# Patient Record
Sex: MALE | Race: WHITE | NOT HISPANIC OR LATINO | Employment: OTHER | URBAN - METROPOLITAN AREA
[De-identification: names, ages, dates, MRNs, and addresses within clinical notes are randomized per-mention and may not be internally consistent; named-entity substitution may affect disease eponyms.]

---

## 2017-08-05 ENCOUNTER — APPOINTMENT (EMERGENCY)
Dept: ULTRASOUND IMAGING | Facility: HOSPITAL | Age: 82
DRG: 872 | End: 2017-08-05
Payer: MEDICARE

## 2017-08-05 ENCOUNTER — HOSPITAL ENCOUNTER (INPATIENT)
Facility: HOSPITAL | Age: 82
LOS: 2 days | Discharge: HOME HEALTH | DRG: 872 | End: 2017-08-07
Attending: INTERNAL MEDICINE | Admitting: INTERNAL MEDICINE
Payer: MEDICARE

## 2017-08-05 DIAGNOSIS — E11.9 DM2 (DIABETES MELLITUS, TYPE 2) (HCC): ICD-10-CM

## 2017-08-05 DIAGNOSIS — R53.81 MALAISE AND FATIGUE: ICD-10-CM

## 2017-08-05 DIAGNOSIS — N45.2 ORCHITIS: ICD-10-CM

## 2017-08-05 DIAGNOSIS — N45.1 EPIDIDYMITIS: Primary | ICD-10-CM

## 2017-08-05 DIAGNOSIS — A41.51 SEPSIS DUE TO ESCHERICHIA COLI (HCC): ICD-10-CM

## 2017-08-05 DIAGNOSIS — I10 HTN (HYPERTENSION), BENIGN: ICD-10-CM

## 2017-08-05 DIAGNOSIS — R53.83 MALAISE AND FATIGUE: ICD-10-CM

## 2017-08-05 DIAGNOSIS — A41.9 SEVERE SEPSIS (HCC): ICD-10-CM

## 2017-08-05 DIAGNOSIS — E86.0 DEHYDRATION: ICD-10-CM

## 2017-08-05 DIAGNOSIS — R73.9 HYPERGLYCEMIA: ICD-10-CM

## 2017-08-05 DIAGNOSIS — R65.20 SEVERE SEPSIS (HCC): ICD-10-CM

## 2017-08-05 PROBLEM — N45.3 EPIDIDYMO-ORCHITIS: Status: ACTIVE | Noted: 2017-08-05

## 2017-08-05 LAB
ACETONE SERPL-MCNC: NEGATIVE MG/DL
ALBUMIN SERPL BCP-MCNC: 2.9 G/DL (ref 3.5–5)
ALP SERPL-CCNC: 108 U/L (ref 46–116)
ALT SERPL W P-5'-P-CCNC: 20 U/L (ref 12–78)
ANION GAP SERPL CALCULATED.3IONS-SCNC: 15 MMOL/L (ref 4–13)
ARTERIAL PATENCY WRIST A: YES
AST SERPL W P-5'-P-CCNC: 14 U/L (ref 5–45)
BACTERIA UR QL AUTO: ABNORMAL /HPF
BASE EXCESS BLDA CALC-SCNC: -3.2 MMOL/L
BASOPHILS # BLD AUTO: 0.07 THOUSANDS/ΜL (ref 0–0.1)
BASOPHILS NFR BLD AUTO: 1 % (ref 0–1)
BILIRUB SERPL-MCNC: 0.7 MG/DL (ref 0.2–1)
BILIRUB UR QL STRIP: NEGATIVE
BUN SERPL-MCNC: 32 MG/DL (ref 5–25)
CALCIUM SERPL-MCNC: 9.2 MG/DL (ref 8.3–10.1)
CHLORIDE SERPL-SCNC: 97 MMOL/L (ref 100–108)
CLARITY UR: CLEAR
CO2 SERPL-SCNC: 19 MMOL/L (ref 21–32)
COLOR UR: YELLOW
CREAT SERPL-MCNC: 1.31 MG/DL (ref 0.6–1.3)
EOSINOPHIL # BLD AUTO: 0.09 THOUSAND/ΜL (ref 0–0.61)
EOSINOPHIL NFR BLD AUTO: 1 % (ref 0–6)
ERYTHROCYTE [DISTWIDTH] IN BLOOD BY AUTOMATED COUNT: 12.4 % (ref 11.6–15.1)
EST. AVERAGE GLUCOSE BLD GHB EST-MCNC: 235 MG/DL
GFR SERPL CREATININE-BSD FRML MDRD: 51 ML/MIN/1.73SQ M
GLUCOSE SERPL-MCNC: 221 MG/DL (ref 65–140)
GLUCOSE SERPL-MCNC: 270 MG/DL (ref 65–140)
GLUCOSE SERPL-MCNC: 384 MG/DL (ref 65–140)
GLUCOSE SERPL-MCNC: 414 MG/DL (ref 65–140)
GLUCOSE UR STRIP-MCNC: ABNORMAL MG/DL
HBA1C MFR BLD: 9.8 % (ref 4.2–6.3)
HCO3 BLDA-SCNC: 18.1 MMOL/L (ref 22–28)
HCT VFR BLD AUTO: 39.9 % (ref 36.5–49.3)
HGB BLD-MCNC: 14.1 G/DL (ref 12–17)
HGB UR QL STRIP.AUTO: ABNORMAL
KETONES UR STRIP-MCNC: NEGATIVE MG/DL
LACTATE SERPL-SCNC: 1.5 MMOL/L (ref 0.5–2)
LACTATE SERPL-SCNC: 3 MMOL/L (ref 0.5–2)
LEUKOCYTE ESTERASE UR QL STRIP: ABNORMAL
LIPASE SERPL-CCNC: 350 U/L (ref 73–393)
LYMPHOCYTES # BLD AUTO: 1.27 THOUSANDS/ΜL (ref 0.6–4.47)
LYMPHOCYTES NFR BLD AUTO: 10 % (ref 14–44)
MCH RBC QN AUTO: 32 PG (ref 26.8–34.3)
MCHC RBC AUTO-ENTMCNC: 35.3 G/DL (ref 31.4–37.4)
MCV RBC AUTO: 91 FL (ref 82–98)
MONOCYTES # BLD AUTO: 0.78 THOUSAND/ΜL (ref 0.17–1.22)
MONOCYTES NFR BLD AUTO: 6 % (ref 4–12)
NEUTROPHILS # BLD AUTO: 10.28 THOUSANDS/ΜL (ref 1.85–7.62)
NEUTS SEG NFR BLD AUTO: 81 % (ref 43–75)
NITRITE UR QL STRIP: NEGATIVE
NON VENT ROOM AIR: 21 %
NON-SQ EPI CELLS URNS QL MICRO: ABNORMAL /HPF
NRBC BLD AUTO-RTO: 0 /100 WBCS
O2 CT BLDA-SCNC: 19.9 ML/DL (ref 16–23)
OXYHGB MFR BLDA: 96.9 % (ref 94–97)
PCO2 BLDA: 23.8 MM HG (ref 36–44)
PH BLDA: 7.5 [PH] (ref 7.35–7.45)
PH UR STRIP.AUTO: 6 [PH] (ref 4.5–8)
PLATELET # BLD AUTO: 359 THOUSANDS/UL (ref 149–390)
PLATELET # BLD AUTO: 369 THOUSANDS/UL (ref 149–390)
PMV BLD AUTO: 9.2 FL (ref 8.9–12.7)
PMV BLD AUTO: 9.3 FL (ref 8.9–12.7)
PO2 BLDA: 102.2 MM HG (ref 75–129)
POTASSIUM SERPL-SCNC: 3.8 MMOL/L (ref 3.5–5.3)
PROT SERPL-MCNC: 6.9 G/DL (ref 6.4–8.2)
PROT UR STRIP-MCNC: NEGATIVE MG/DL
RBC # BLD AUTO: 4.41 MILLION/UL (ref 3.88–5.62)
RBC #/AREA URNS AUTO: ABNORMAL /HPF
SODIUM SERPL-SCNC: 131 MMOL/L (ref 136–145)
SP GR UR STRIP.AUTO: <=1.005 (ref 1–1.03)
SPECIMEN SOURCE: ABNORMAL
TROPONIN I SERPL-MCNC: <0.02 NG/ML
TSH SERPL DL<=0.05 MIU/L-ACNC: 1.57 UIU/ML (ref 0.36–3.74)
UROBILINOGEN UR QL STRIP.AUTO: 0.2 E.U./DL
WBC # BLD AUTO: 12.64 THOUSAND/UL (ref 4.31–10.16)
WBC #/AREA URNS AUTO: ABNORMAL /HPF

## 2017-08-05 PROCEDURE — 83690 ASSAY OF LIPASE: CPT | Performed by: PHYSICIAN ASSISTANT

## 2017-08-05 PROCEDURE — 87186 SC STD MICRODIL/AGAR DIL: CPT | Performed by: PHYSICIAN ASSISTANT

## 2017-08-05 PROCEDURE — 93005 ELECTROCARDIOGRAM TRACING: CPT

## 2017-08-05 PROCEDURE — 82009 KETONE BODYS QUAL: CPT | Performed by: PHYSICIAN ASSISTANT

## 2017-08-05 PROCEDURE — 80053 COMPREHEN METABOLIC PANEL: CPT | Performed by: PHYSICIAN ASSISTANT

## 2017-08-05 PROCEDURE — 85049 AUTOMATED PLATELET COUNT: CPT | Performed by: PHYSICIAN ASSISTANT

## 2017-08-05 PROCEDURE — 96361 HYDRATE IV INFUSION ADD-ON: CPT

## 2017-08-05 PROCEDURE — 81001 URINALYSIS AUTO W/SCOPE: CPT | Performed by: PHYSICIAN ASSISTANT

## 2017-08-05 PROCEDURE — 76870 US EXAM SCROTUM: CPT

## 2017-08-05 PROCEDURE — 82948 REAGENT STRIP/BLOOD GLUCOSE: CPT

## 2017-08-05 PROCEDURE — 85025 COMPLETE CBC W/AUTO DIFF WBC: CPT | Performed by: PHYSICIAN ASSISTANT

## 2017-08-05 PROCEDURE — 99285 EMERGENCY DEPT VISIT HI MDM: CPT

## 2017-08-05 PROCEDURE — 96365 THER/PROPH/DIAG IV INF INIT: CPT

## 2017-08-05 PROCEDURE — 83605 ASSAY OF LACTIC ACID: CPT | Performed by: PHYSICIAN ASSISTANT

## 2017-08-05 PROCEDURE — 84443 ASSAY THYROID STIM HORMONE: CPT | Performed by: PHYSICIAN ASSISTANT

## 2017-08-05 PROCEDURE — 87086 URINE CULTURE/COLONY COUNT: CPT | Performed by: PHYSICIAN ASSISTANT

## 2017-08-05 PROCEDURE — 36600 WITHDRAWAL OF ARTERIAL BLOOD: CPT

## 2017-08-05 PROCEDURE — 82805 BLOOD GASES W/O2 SATURATION: CPT | Performed by: PHYSICIAN ASSISTANT

## 2017-08-05 PROCEDURE — 36415 COLL VENOUS BLD VENIPUNCTURE: CPT | Performed by: PHYSICIAN ASSISTANT

## 2017-08-05 PROCEDURE — 96366 THER/PROPH/DIAG IV INF ADDON: CPT

## 2017-08-05 PROCEDURE — 83036 HEMOGLOBIN GLYCOSYLATED A1C: CPT | Performed by: PHYSICIAN ASSISTANT

## 2017-08-05 PROCEDURE — 87040 BLOOD CULTURE FOR BACTERIA: CPT | Performed by: PHYSICIAN ASSISTANT

## 2017-08-05 PROCEDURE — 87077 CULTURE AEROBIC IDENTIFY: CPT | Performed by: PHYSICIAN ASSISTANT

## 2017-08-05 PROCEDURE — 84484 ASSAY OF TROPONIN QUANT: CPT | Performed by: PHYSICIAN ASSISTANT

## 2017-08-05 RX ORDER — GLIPIZIDE 10 MG/1
10 TABLET, FILM COATED, EXTENDED RELEASE ORAL DAILY
COMMUNITY
End: 2017-08-07 | Stop reason: HOSPADM

## 2017-08-05 RX ORDER — ASPIRIN 81 MG/1
81 TABLET ORAL DAILY
COMMUNITY

## 2017-08-05 RX ORDER — ASPIRIN 81 MG/1
81 TABLET ORAL DAILY
Status: DISCONTINUED | OUTPATIENT
Start: 2017-08-06 | End: 2017-08-07 | Stop reason: HOSPADM

## 2017-08-05 RX ORDER — ONDANSETRON 2 MG/ML
4 INJECTION INTRAMUSCULAR; INTRAVENOUS EVERY 6 HOURS PRN
Status: DISCONTINUED | OUTPATIENT
Start: 2017-08-05 | End: 2017-08-07 | Stop reason: HOSPADM

## 2017-08-05 RX ORDER — LISINOPRIL 10 MG/1
10 TABLET ORAL DAILY
Status: DISCONTINUED | OUTPATIENT
Start: 2017-08-06 | End: 2017-08-07 | Stop reason: HOSPADM

## 2017-08-05 RX ORDER — ACETAMINOPHEN 325 MG/1
650 TABLET ORAL EVERY 6 HOURS PRN
Status: DISCONTINUED | OUTPATIENT
Start: 2017-08-05 | End: 2017-08-07 | Stop reason: HOSPADM

## 2017-08-05 RX ORDER — LISINOPRIL 10 MG/1
10 TABLET ORAL DAILY
COMMUNITY

## 2017-08-05 RX ORDER — TAMSULOSIN HYDROCHLORIDE 0.4 MG/1
0.4 CAPSULE ORAL
Status: DISCONTINUED | OUTPATIENT
Start: 2017-08-05 | End: 2017-08-07 | Stop reason: HOSPADM

## 2017-08-05 RX ORDER — INSULIN GLARGINE 100 [IU]/ML
10 INJECTION, SOLUTION SUBCUTANEOUS
Status: DISCONTINUED | OUTPATIENT
Start: 2017-08-05 | End: 2017-08-07 | Stop reason: HOSPADM

## 2017-08-05 RX ORDER — SODIUM CHLORIDE 9 MG/ML
125 INJECTION, SOLUTION INTRAVENOUS CONTINUOUS
Status: DISCONTINUED | OUTPATIENT
Start: 2017-08-05 | End: 2017-08-06

## 2017-08-05 RX ADMIN — SODIUM CHLORIDE 1000 ML: 0.9 INJECTION, SOLUTION INTRAVENOUS at 14:36

## 2017-08-05 RX ADMIN — SODIUM CHLORIDE 1000 ML: 0.9 INJECTION, SOLUTION INTRAVENOUS at 12:30

## 2017-08-05 RX ADMIN — TAMSULOSIN HYDROCHLORIDE 0.4 MG: 0.4 CAPSULE ORAL at 18:16

## 2017-08-05 RX ADMIN — SODIUM CHLORIDE 125 ML/HR: 0.9 INJECTION, SOLUTION INTRAVENOUS at 18:04

## 2017-08-05 RX ADMIN — CEFEPIME HYDROCHLORIDE 2000 MG: 2 INJECTION, SOLUTION INTRAVENOUS at 13:19

## 2017-08-05 RX ADMIN — INSULIN LISPRO 2 UNITS: 100 INJECTION, SOLUTION INTRAVENOUS; SUBCUTANEOUS at 18:11

## 2017-08-05 RX ADMIN — INSULIN GLARGINE 10 UNITS: 100 INJECTION, SOLUTION SUBCUTANEOUS at 21:43

## 2017-08-06 PROBLEM — R53.81 MALAISE AND FATIGUE: Status: RESOLVED | Noted: 2017-08-05 | Resolved: 2017-08-06

## 2017-08-06 PROBLEM — I10 HTN (HYPERTENSION), BENIGN: Status: ACTIVE | Noted: 2017-08-06

## 2017-08-06 PROBLEM — E11.9 DM2 (DIABETES MELLITUS, TYPE 2) (HCC): Status: ACTIVE | Noted: 2017-08-06

## 2017-08-06 PROBLEM — R73.9 HYPERGLYCEMIA: Status: RESOLVED | Noted: 2017-08-05 | Resolved: 2017-08-06

## 2017-08-06 PROBLEM — R53.83 MALAISE AND FATIGUE: Status: RESOLVED | Noted: 2017-08-05 | Resolved: 2017-08-06

## 2017-08-06 PROBLEM — N17.9 AKI (ACUTE KIDNEY INJURY) (HCC): Status: ACTIVE | Noted: 2017-08-06

## 2017-08-06 PROBLEM — A41.9 SEPSIS (HCC): Status: RESOLVED | Noted: 2017-08-05 | Resolved: 2017-08-06

## 2017-08-06 PROBLEM — E78.5 HLD (HYPERLIPIDEMIA): Status: ACTIVE | Noted: 2017-08-06

## 2017-08-06 LAB
ANION GAP SERPL CALCULATED.3IONS-SCNC: 10 MMOL/L (ref 4–13)
ATRIAL RATE: 68 BPM
BUN SERPL-MCNC: 21 MG/DL (ref 5–25)
CALCIUM SERPL-MCNC: 8.7 MG/DL (ref 8.3–10.1)
CHLORIDE SERPL-SCNC: 105 MMOL/L (ref 100–108)
CO2 SERPL-SCNC: 22 MMOL/L (ref 21–32)
CREAT SERPL-MCNC: 1 MG/DL (ref 0.6–1.3)
ERYTHROCYTE [DISTWIDTH] IN BLOOD BY AUTOMATED COUNT: 12.6 % (ref 11.6–15.1)
GFR SERPL CREATININE-BSD FRML MDRD: 71 ML/MIN/1.73SQ M
GLUCOSE SERPL-MCNC: 176 MG/DL (ref 65–140)
GLUCOSE SERPL-MCNC: 181 MG/DL (ref 65–140)
GLUCOSE SERPL-MCNC: 228 MG/DL (ref 65–140)
GLUCOSE SERPL-MCNC: 250 MG/DL (ref 65–140)
GLUCOSE SERPL-MCNC: 270 MG/DL (ref 65–140)
HCT VFR BLD AUTO: 36.5 % (ref 36.5–49.3)
HGB BLD-MCNC: 12.7 G/DL (ref 12–17)
MAGNESIUM SERPL-MCNC: 2 MG/DL (ref 1.6–2.6)
MCH RBC QN AUTO: 31.8 PG (ref 26.8–34.3)
MCHC RBC AUTO-ENTMCNC: 34.8 G/DL (ref 31.4–37.4)
MCV RBC AUTO: 91 FL (ref 82–98)
P AXIS: 59 DEGREES
PLATELET # BLD AUTO: 337 THOUSANDS/UL (ref 149–390)
PMV BLD AUTO: 9.1 FL (ref 8.9–12.7)
POTASSIUM SERPL-SCNC: 4 MMOL/L (ref 3.5–5.3)
PR INTERVAL: 196 MS
QRS AXIS: 108 DEGREES
QRSD INTERVAL: 72 MS
QT INTERVAL: 398 MS
QTC INTERVAL: 423 MS
RBC # BLD AUTO: 4 MILLION/UL (ref 3.88–5.62)
SODIUM SERPL-SCNC: 137 MMOL/L (ref 136–145)
T WAVE AXIS: 80 DEGREES
VENTRICULAR RATE: 68 BPM
WBC # BLD AUTO: 11.17 THOUSAND/UL (ref 4.31–10.16)

## 2017-08-06 PROCEDURE — 82948 REAGENT STRIP/BLOOD GLUCOSE: CPT

## 2017-08-06 PROCEDURE — 85027 COMPLETE CBC AUTOMATED: CPT | Performed by: PHYSICIAN ASSISTANT

## 2017-08-06 PROCEDURE — 80048 BASIC METABOLIC PNL TOTAL CA: CPT | Performed by: PHYSICIAN ASSISTANT

## 2017-08-06 PROCEDURE — 83735 ASSAY OF MAGNESIUM: CPT | Performed by: PHYSICIAN ASSISTANT

## 2017-08-06 RX ORDER — PRAVASTATIN SODIUM 40 MG
40 TABLET ORAL
Status: DISCONTINUED | OUTPATIENT
Start: 2017-08-06 | End: 2017-08-07 | Stop reason: HOSPADM

## 2017-08-06 RX ORDER — SENNOSIDES 8.6 MG
2 TABLET ORAL
Status: DISCONTINUED | OUTPATIENT
Start: 2017-08-06 | End: 2017-08-07 | Stop reason: HOSPADM

## 2017-08-06 RX ORDER — POLYETHYLENE GLYCOL 3350 17 G/17G
17 POWDER, FOR SOLUTION ORAL DAILY
Status: DISCONTINUED | OUTPATIENT
Start: 2017-08-06 | End: 2017-08-07 | Stop reason: HOSPADM

## 2017-08-06 RX ORDER — DOCUSATE SODIUM 100 MG/1
100 CAPSULE, LIQUID FILLED ORAL 2 TIMES DAILY
Status: DISCONTINUED | OUTPATIENT
Start: 2017-08-06 | End: 2017-08-07 | Stop reason: HOSPADM

## 2017-08-06 RX ADMIN — TAMSULOSIN HYDROCHLORIDE 0.4 MG: 0.4 CAPSULE ORAL at 16:34

## 2017-08-06 RX ADMIN — ENOXAPARIN SODIUM 40 MG: 40 INJECTION SUBCUTANEOUS at 08:19

## 2017-08-06 RX ADMIN — INSULIN LISPRO 2 UNITS: 100 INJECTION, SOLUTION INTRAVENOUS; SUBCUTANEOUS at 16:35

## 2017-08-06 RX ADMIN — INSULIN LISPRO 1 UNITS: 100 INJECTION, SOLUTION INTRAVENOUS; SUBCUTANEOUS at 07:31

## 2017-08-06 RX ADMIN — INSULIN LISPRO 2 UNITS: 100 INJECTION, SOLUTION INTRAVENOUS; SUBCUTANEOUS at 12:20

## 2017-08-06 RX ADMIN — ASPIRIN 81 MG: 81 TABLET, COATED ORAL at 08:19

## 2017-08-06 RX ADMIN — SENNOSIDES 17.2 MG: 8.6 TABLET, FILM COATED ORAL at 21:35

## 2017-08-06 RX ADMIN — DOCUSATE SODIUM 100 MG: 100 CAPSULE, LIQUID FILLED ORAL at 12:00

## 2017-08-06 RX ADMIN — PRAVASTATIN SODIUM 40 MG: 40 TABLET ORAL at 16:34

## 2017-08-06 RX ADMIN — SODIUM CHLORIDE 125 ML/HR: 0.9 INJECTION, SOLUTION INTRAVENOUS at 02:23

## 2017-08-06 RX ADMIN — INSULIN GLARGINE 10 UNITS: 100 INJECTION, SOLUTION SUBCUTANEOUS at 21:35

## 2017-08-06 RX ADMIN — SODIUM CHLORIDE 125 ML/HR: 0.9 INJECTION, SOLUTION INTRAVENOUS at 10:04

## 2017-08-06 RX ADMIN — LISINOPRIL 10 MG: 10 TABLET ORAL at 08:19

## 2017-08-06 RX ADMIN — POLYETHYLENE GLYCOL 3350 17 G: 17 POWDER, FOR SOLUTION ORAL at 12:23

## 2017-08-06 RX ADMIN — CEFEPIME HYDROCHLORIDE 1000 MG: 1 INJECTION, POWDER, FOR SOLUTION INTRAMUSCULAR; INTRAVENOUS at 13:00

## 2017-08-07 VITALS
HEART RATE: 55 BPM | OXYGEN SATURATION: 97 % | HEIGHT: 70 IN | TEMPERATURE: 98 F | RESPIRATION RATE: 18 BRPM | BODY MASS INDEX: 24.93 KG/M2 | WEIGHT: 174.16 LBS | DIASTOLIC BLOOD PRESSURE: 56 MMHG | SYSTOLIC BLOOD PRESSURE: 111 MMHG

## 2017-08-07 PROBLEM — N17.9 AKI (ACUTE KIDNEY INJURY) (HCC): Status: RESOLVED | Noted: 2017-08-06 | Resolved: 2017-08-07

## 2017-08-07 LAB
ANION GAP SERPL CALCULATED.3IONS-SCNC: 9 MMOL/L (ref 4–13)
BACTERIA UR CULT: NORMAL
BUN SERPL-MCNC: 17 MG/DL (ref 5–25)
CALCIUM SERPL-MCNC: 8.8 MG/DL (ref 8.3–10.1)
CHLORIDE SERPL-SCNC: 105 MMOL/L (ref 100–108)
CO2 SERPL-SCNC: 22 MMOL/L (ref 21–32)
CREAT SERPL-MCNC: 0.94 MG/DL (ref 0.6–1.3)
ERYTHROCYTE [DISTWIDTH] IN BLOOD BY AUTOMATED COUNT: 12.7 % (ref 11.6–15.1)
GFR SERPL CREATININE-BSD FRML MDRD: 77 ML/MIN/1.73SQ M
GLUCOSE SERPL-MCNC: 187 MG/DL (ref 65–140)
GLUCOSE SERPL-MCNC: 213 MG/DL (ref 65–140)
GLUCOSE SERPL-MCNC: 249 MG/DL (ref 65–140)
GLUCOSE SERPL-MCNC: 258 MG/DL (ref 65–140)
HCT VFR BLD AUTO: 36.7 % (ref 36.5–49.3)
HGB BLD-MCNC: 12.6 G/DL (ref 12–17)
MCH RBC QN AUTO: 31.2 PG (ref 26.8–34.3)
MCHC RBC AUTO-ENTMCNC: 34.3 G/DL (ref 31.4–37.4)
MCV RBC AUTO: 91 FL (ref 82–98)
PLATELET # BLD AUTO: 302 THOUSANDS/UL (ref 149–390)
PMV BLD AUTO: 9.2 FL (ref 8.9–12.7)
POTASSIUM SERPL-SCNC: 4.3 MMOL/L (ref 3.5–5.3)
RBC # BLD AUTO: 4.04 MILLION/UL (ref 3.88–5.62)
SODIUM SERPL-SCNC: 136 MMOL/L (ref 136–145)
WBC # BLD AUTO: 8.02 THOUSAND/UL (ref 4.31–10.16)

## 2017-08-07 PROCEDURE — 85027 COMPLETE CBC AUTOMATED: CPT | Performed by: PHYSICIAN ASSISTANT

## 2017-08-07 PROCEDURE — G8979 MOBILITY GOAL STATUS: HCPCS

## 2017-08-07 PROCEDURE — 97163 PT EVAL HIGH COMPLEX 45 MIN: CPT

## 2017-08-07 PROCEDURE — 82948 REAGENT STRIP/BLOOD GLUCOSE: CPT

## 2017-08-07 PROCEDURE — 80048 BASIC METABOLIC PNL TOTAL CA: CPT | Performed by: PHYSICIAN ASSISTANT

## 2017-08-07 PROCEDURE — G8978 MOBILITY CURRENT STATUS: HCPCS

## 2017-08-07 RX ORDER — PRAVASTATIN SODIUM 40 MG
40 TABLET ORAL
Qty: 30 TABLET | Refills: 0 | Status: SHIPPED | OUTPATIENT
Start: 2017-08-07 | End: 2017-08-07 | Stop reason: HOSPADM

## 2017-08-07 RX ORDER — INSULIN GLARGINE 100 [IU]/ML
10 INJECTION, SOLUTION SUBCUTANEOUS
Qty: 10 ML | Refills: 0 | Status: SHIPPED | OUTPATIENT
Start: 2017-08-07 | End: 2017-08-07 | Stop reason: HOSPADM

## 2017-08-07 RX ORDER — DOXYCYCLINE HYCLATE 100 MG
100 TABLET ORAL 2 TIMES DAILY
Qty: 12 TABLET | Refills: 0 | Status: SHIPPED | OUTPATIENT
Start: 2017-08-07 | End: 2017-08-13

## 2017-08-07 RX ADMIN — ENOXAPARIN SODIUM 40 MG: 40 INJECTION SUBCUTANEOUS at 10:11

## 2017-08-07 RX ADMIN — PRAVASTATIN SODIUM 40 MG: 40 TABLET ORAL at 17:18

## 2017-08-07 RX ADMIN — INSULIN LISPRO 1 UNITS: 100 INJECTION, SOLUTION INTRAVENOUS; SUBCUTANEOUS at 10:12

## 2017-08-07 RX ADMIN — TAMSULOSIN HYDROCHLORIDE 0.4 MG: 0.4 CAPSULE ORAL at 17:18

## 2017-08-07 RX ADMIN — LISINOPRIL 10 MG: 10 TABLET ORAL at 10:11

## 2017-08-07 RX ADMIN — ASPIRIN 81 MG: 81 TABLET, COATED ORAL at 10:11

## 2017-08-07 RX ADMIN — CEFEPIME HYDROCHLORIDE 1000 MG: 1 INJECTION, POWDER, FOR SOLUTION INTRAMUSCULAR; INTRAVENOUS at 14:13

## 2017-08-07 RX ADMIN — DOCUSATE SODIUM 100 MG: 100 CAPSULE, LIQUID FILLED ORAL at 10:11

## 2017-08-07 RX ADMIN — INSULIN LISPRO 2 UNITS: 100 INJECTION, SOLUTION INTRAVENOUS; SUBCUTANEOUS at 13:37

## 2017-08-07 RX ADMIN — POLYETHYLENE GLYCOL 3350 17 G: 17 POWDER, FOR SOLUTION ORAL at 10:11

## 2017-08-07 RX ADMIN — DOCUSATE SODIUM 100 MG: 100 CAPSULE, LIQUID FILLED ORAL at 17:18

## 2017-08-07 RX ADMIN — INSULIN LISPRO 3 UNITS: 100 INJECTION, SOLUTION INTRAVENOUS; SUBCUTANEOUS at 17:18

## 2017-08-10 LAB
BACTERIA BLD CULT: NORMAL
BACTERIA BLD CULT: NORMAL

## 2023-08-10 LAB
LEFT EYE DIABETIC RETINOPATHY: NORMAL
RIGHT EYE DIABETIC RETINOPATHY: NORMAL

## 2023-12-05 ENCOUNTER — TELEPHONE (OUTPATIENT)
Age: 88
End: 2023-12-05

## 2023-12-05 NOTE — TELEPHONE ENCOUNTER
FYI:  Patient canceled  his appt. He stated he was discharged from hospital after having surgery. Was there for 3 days. Patient will call back to reschedule when he feels better.

## 2023-12-20 ENCOUNTER — OFFICE VISIT (OUTPATIENT)
Dept: URGENT CARE | Facility: MEDICAL CENTER | Age: 88
End: 2023-12-20
Payer: MEDICARE

## 2023-12-20 VITALS
DIASTOLIC BLOOD PRESSURE: 82 MMHG | TEMPERATURE: 98 F | SYSTOLIC BLOOD PRESSURE: 134 MMHG | RESPIRATION RATE: 18 BRPM | HEART RATE: 80 BPM | OXYGEN SATURATION: 99 %

## 2023-12-20 DIAGNOSIS — S61.219A LACERATION WITHOUT FOREIGN BODY OF UNSPECIFIED FINGER WITHOUT DAMAGE TO NAIL, INITIAL ENCOUNTER: Primary | ICD-10-CM

## 2023-12-20 PROCEDURE — G0463 HOSPITAL OUTPT CLINIC VISIT: HCPCS | Performed by: PHYSICIAN ASSISTANT

## 2023-12-20 PROCEDURE — 99213 OFFICE O/P EST LOW 20 MIN: CPT | Performed by: PHYSICIAN ASSISTANT

## 2023-12-20 RX ORDER — TERAZOSIN 1 MG/1
CAPSULE ORAL
COMMUNITY
Start: 2011-04-19 | End: 2023-12-28

## 2023-12-20 RX ORDER — GLIPIZIDE 10 MG/1
TABLET ORAL
COMMUNITY
Start: 2011-03-20 | End: 2023-12-28

## 2023-12-20 RX ORDER — PIOGLITAZONEHYDROCHLORIDE 30 MG/1
TABLET ORAL
COMMUNITY
Start: 2011-04-25 | End: 2023-12-28

## 2023-12-20 RX ORDER — ROSUVASTATIN CALCIUM 5 MG/1
5 TABLET, COATED ORAL DAILY
COMMUNITY

## 2023-12-20 RX ORDER — TAMSULOSIN HYDROCHLORIDE 0.4 MG/1
0.4 CAPSULE ORAL DAILY
COMMUNITY
Start: 2023-10-26

## 2023-12-20 RX ORDER — FOSINOPRIL SODIUM 10 MG/1
TABLET ORAL
COMMUNITY
Start: 2011-04-19 | End: 2023-12-28

## 2023-12-20 NOTE — PATIENT INSTRUCTIONS
Laceration finger  Keep wound clean and dry, change dressing twice daily  Follow up with PCP in 3-5 days.  Proceed to  ER if symptoms worsen.

## 2023-12-20 NOTE — PROGRESS NOTES
"  [facility]        NAME: Jeffery Keys is a 88 y.o. male  : 1935    MRN: [id number]  DATE: 2023  TIME: 10:42 AM    Assessment and Plan   Laceration without foreign body of unspecified finger without damage to nail, initial encounter [S61.219A]  1. Laceration without foreign body of unspecified finger without damage to nail, initial encounter              Patient Instructions     Laceration finger  Keep wound clean and dry, change dressing twice daily  Follow up with PCP in 3-5 days.  Proceed to  ER if symptoms worsen.    Chief Complaint     Chief Complaint   Patient presents with    Multiple Falls     Patient states he has had multiple falls in the last month; was evaluated in the ED and sent to rehab for strengthening    Laceration     Laceration to left third digit from a week ago on \"blade\" after fall a week ago; evaluated at [facility] already but wants to \"talk to someone\" about the fall and hernia          History of Present Illness       88-year-old male who presents complaining of having cut his finger over a week ago.  Patient states that he suffers from orthostatic hypotension.  States that he fell approximately 2 weeks ago and was seen at the hospital where he had a full workup and then discharged.  Today he presents for wound check.  Denies any other complaints.    Laceration         Review of Systems   Review of Systems   Constitutional: Negative.    HENT: Negative.     Eyes: Negative.    Respiratory: Negative.  Negative for apnea, cough, choking, chest tightness, shortness of breath, wheezing and stridor.    Cardiovascular: Negative.  Negative for chest pain.   Skin:  Positive for wound.         Current Medications       Current Outpatient Medications:     fosinopril (MONOPRIL) 10 mg tablet, Take by mouth, Disp: , Rfl:     glipiZIDE (GLUCOTROL) 10 mg tablet, Take by mouth, Disp: , Rfl:     insulin detemir (LEVEMIR FLEXTOUCH) 100 Units/mL injection pen, Inject 20 Units under " the skin daily, Disp: , Rfl:     pioglitazone (Actos) 30 mg tablet, Take by mouth, Disp: , Rfl:     tamsulosin (FLOMAX) 0.4 mg, Take 0.4 mg by mouth daily, Disp: , Rfl:     terazosin (HYTRIN) 1 mg capsule, Take by mouth, Disp: , Rfl:     aspirin (ECOTRIN LOW STRENGTH) 81 mg EC tablet, Take 81 mg by mouth daily, Disp: , Rfl:     Insulin Glargine (LANTUS SOLOSTAR) 100 UNIT/ML SOPN, Inject 0.1 mL under the skin daily at bedtime, Disp: 3 mL, Rfl: 0    Insulin Pen Needle 31G X 8 MM MISC, by Does not apply route daily, Disp: 100 each, Rfl: 0    lisinopril (ZESTRIL) 10 mg tablet, Take 10 mg by mouth daily, Disp: , Rfl:     metFORMIN (GLUCOPHAGE) 850 mg tablet, Take 850 mg by mouth 2 (two) times a day with meals, Disp: , Rfl:     rosuvastatin (CRESTOR) 5 mg tablet, Take 5 mg by mouth daily, Disp: , Rfl:     Rosuvastatin Calcium (CRESTOR PO), Take by mouth, Disp: , Rfl:     TAMSULOSIN HCL PO, Take by mouth, Disp: , Rfl:     Current Allergies     Allergies as of 12/20/2023    (No Known Allergies)            The following portions of the patient's history were reviewed and updated as appropriate: allergies, current medications, past family history, past medical history, past social history, past surgical history and problem list.     Past Medical History:   Diagnosis Date    Cardiac disease     Diabetes mellitus (HCC)     Hyperlipidemia     Hypertension     Hyperthyroidism        Past Surgical History:   Procedure Laterality Date    CHOLECYSTECTOMY      HERNIA REPAIR         Family History   Problem Relation Age of Onset    Heart disease Family          Medications have been verified.        Objective   /82   Pulse 80   Temp 98 °F (36.7 °C) (Temporal)   Resp 18   SpO2 99%        Physical Exam     Physical Exam  Constitutional:       General: He is not in acute distress.     Appearance: He is well-developed. He is not diaphoretic.   Cardiovascular:      Rate and Rhythm: Normal rate and regular rhythm.      Heart  sounds: Normal heart sounds.   Pulmonary:      Effort: Pulmonary effort is normal. No respiratory distress.      Breath sounds: Normal breath sounds. No wheezing or rales.   Chest:      Chest wall: No tenderness.   Musculoskeletal:      Cervical back: Normal range of motion and neck supple.   Lymphadenopathy:      Cervical: No cervical adenopathy.   Skin:

## 2023-12-28 ENCOUNTER — OFFICE VISIT (OUTPATIENT)
Dept: FAMILY MEDICINE CLINIC | Facility: MEDICAL CENTER | Age: 88
End: 2023-12-28
Payer: MEDICARE

## 2023-12-28 VITALS
HEART RATE: 86 BPM | SYSTOLIC BLOOD PRESSURE: 128 MMHG | BODY MASS INDEX: 25.37 KG/M2 | RESPIRATION RATE: 20 BRPM | HEIGHT: 68 IN | OXYGEN SATURATION: 96 % | TEMPERATURE: 98.7 F | DIASTOLIC BLOOD PRESSURE: 76 MMHG | WEIGHT: 167.4 LBS

## 2023-12-28 DIAGNOSIS — R26.2 AMBULATORY DYSFUNCTION: ICD-10-CM

## 2023-12-28 DIAGNOSIS — I10 HTN (HYPERTENSION), BENIGN: ICD-10-CM

## 2023-12-28 DIAGNOSIS — R55 SYNCOPE, UNSPECIFIED SYNCOPE TYPE: ICD-10-CM

## 2023-12-28 DIAGNOSIS — K40.90 LEFT INGUINAL HERNIA: ICD-10-CM

## 2023-12-28 DIAGNOSIS — Z76.89 ENCOUNTER TO ESTABLISH CARE WITH NEW DOCTOR: Primary | ICD-10-CM

## 2023-12-28 DIAGNOSIS — Z79.4 TYPE 2 DIABETES MELLITUS WITHOUT COMPLICATION, WITH LONG-TERM CURRENT USE OF INSULIN (HCC): ICD-10-CM

## 2023-12-28 DIAGNOSIS — E78.5 HYPERLIPIDEMIA, UNSPECIFIED HYPERLIPIDEMIA TYPE: ICD-10-CM

## 2023-12-28 DIAGNOSIS — E11.9 TYPE 2 DIABETES MELLITUS WITHOUT COMPLICATION, WITH LONG-TERM CURRENT USE OF INSULIN (HCC): ICD-10-CM

## 2023-12-28 PROCEDURE — 99204 OFFICE O/P NEW MOD 45 MIN: CPT

## 2023-12-28 RX ORDER — MECLIZINE HCL 12.5 MG/1
TABLET ORAL 3 TIMES DAILY PRN
COMMUNITY

## 2023-12-28 RX ORDER — LATANOPROST 50 UG/ML
1 SOLUTION/ DROPS OPHTHALMIC
COMMUNITY

## 2023-12-28 NOTE — PROGRESS NOTES
Assessment/Plan:    Fasting lab orders placed, to be done prior to next visit.  Return in 2 months for AWV/follow-up, sooner if needed.  Continue current medication regimen.     1. Encounter to establish care with new doctor  88-year-old male presents to establish care with new provider.  He is a previous patient of Dr. Orozco.  Past medical history includes but is not limited to diabetes mellitus type 2, hypertension, hyperlipidemia, ambulatory dysfunction, inguinal hernia, syncope.    2. Type 2 diabetes mellitus without complication, with long-term current use of insulin (HCC)  A1c 7.0% on labs from October.  Continue metformin and Levemir at current dose, refill not needed per patient  Recheck labs in 2 months prior to next office visit.  Continue to monitor home blood sugars daily, keep log.  - Comprehensive metabolic panel; Future  - Hemoglobin A1C; Future  - Lipid panel; Future    3. Hyperlipidemia, unspecified hyperlipidemia type  Continue rosuvastatin.  Check labs prior to next office visit in 2 months.  - Lipid panel; Future    4. HTN (hypertension), benign  History of hypertension listed on patient's medical record.  Patient denies ever being diagnosed with or treated for hypertension.  Blood pressure stable in office.    5. Ambulatory dysfunction  Ambulatory with use of cane.  Steady gait.    6. Syncope, unspecified syncope type  Emergency department evaluation for syncope on 12/7.  Diagnosed as vasovagal.  Meclizine prescribed, patient is not taking this medication due to reading side effect of dizziness.  Reports feeling dizzy upon standing sometimes.  Currently asymptomatic.    7. Left inguinal hernia  Left inguinal hernia repair [facility] Dr. Leo.  Patient reporting lump to area, denies pain.  Incision healed well.  Does not wish to follow-up with surgeon regarding this as advised.    Subjective:      Patient ID: Jeffery Keys is a 88 y.o. male.    88-year-old male presents to establish care  with new provider.  He is a previous patient of Dr. Orozco.  He is currently living alone. Past medical history includes but is not limited to diabetes mellitus type 2, hypertension, hyperlipidemia, ambulatory dysfunction, inguinal hernia, syncope. Patient is a poor historian and is not sure about his medical history other than he is being treated for diabetes.   He was in the hospital on 12/7 for syncope, was discharged with Meclizine. Reports he only took this a few times and did not want to take it anymore because the bottle says the medication can cause dizziness. He tells me he has a home care nurse coming to check his blood pressure regularly. He reports dizziness upon standing too quickly. Currently asymptomatic.   For his diabetes, he is currently on Metformin and insulin. He was previously on Levemir 25 units daily which he is still taking, he was instructed to decrease to 20 units daily upon recently hospital discharge but he continues to take 25 units daily. He reports checking his home blood sugars once daily at home, reports sugars ranging from 140-160s depending on what he eats. He tells me he does not watch what he eats. Last A1c in October 7.0%. He tells me he has been on the same medications for 20+ years since seeing previous pcp.   He tells me he takes Rosuvastatin for hyperlipidemia and DM.  He reports never being treated for HTN.   He is ambulatory with use of cane.  He had left inguinal hernia repair on 12/3 at [facility]. He is complaining of a lump to the area of surgical repair, denies pain. Does not wish to follow up with surgeon.                   The following portions of the patient's history were reviewed and updated as appropriate: allergies, past family history, past medical history, past social history, past surgical history, and problem list.    Review of Systems   Constitutional: Negative.    HENT: Negative.     Eyes: Negative.    Respiratory: Negative.     Cardiovascular:  "Negative.    Gastrointestinal:         Lump to left inguinal area, s/p hernia repair   Endocrine: Negative.    Genitourinary: Negative.    Musculoskeletal: Negative.    Skin: Negative.    Neurological: Negative.    Hematological: Negative.    Psychiatric/Behavioral: Negative.           Objective:      /76 (BP Location: Left arm, Patient Position: Sitting, Cuff Size: Adult)   Pulse 86   Temp 98.7 °F (37.1 °C)   Resp 20   Ht 5' 8\" (1.727 m)   Wt 75.9 kg (167 lb 6.4 oz)   SpO2 96%   BMI 25.45 kg/m²          Physical Exam  Vitals and nursing note reviewed.   Constitutional:       General: He is not in acute distress.     Appearance: Normal appearance. He is not ill-appearing.   HENT:      Head: Normocephalic and atraumatic.   Cardiovascular:      Rate and Rhythm: Normal rate and regular rhythm.      Pulses: Normal pulses.      Heart sounds: Normal heart sounds.   Pulmonary:      Effort: Pulmonary effort is normal.      Breath sounds: Normal breath sounds.   Abdominal:      General: Abdomen is flat. Bowel sounds are normal.      Palpations: Abdomen is soft.      Tenderness: There is no abdominal tenderness.      Comments: Left inguinal hernia repair scar appears to be healing well. No evidence of discharge, discoloration to skin. Firm area noted to palpation, non-tender.    Skin:     General: Skin is warm and dry.   Neurological:      General: No focal deficit present.      Mental Status: He is alert.      Gait: Abnormal gait: ambulatory with use of cane.   Psychiatric:         Mood and Affect: Mood normal.         Behavior: Behavior normal.                    DELILAH Mcpherson  "

## 2024-01-23 DIAGNOSIS — E78.5 HYPERLIPIDEMIA, UNSPECIFIED HYPERLIPIDEMIA TYPE: Primary | ICD-10-CM

## 2024-01-23 RX ORDER — ROSUVASTATIN CALCIUM 5 MG/1
5 TABLET, COATED ORAL DAILY
Qty: 90 TABLET | Refills: 0 | Status: SHIPPED | OUTPATIENT
Start: 2024-01-23

## 2024-01-23 NOTE — TELEPHONE ENCOUNTER
Reason for call:   [x] Refill   [] Prior Auth  [] Other:     Office:   [] PCP/Provider -   [x] Specialty/Provider -     Medication: CRESTOR    Dose/Frequency: 5 MG    Quantity: 90    Pharmacy: [address] [facility]    Does the patient have enough for 3 days?   [] Yes   [x] No - Send as HP to POD

## 2024-01-24 ENCOUNTER — OFFICE VISIT (OUTPATIENT)
Dept: FAMILY MEDICINE CLINIC | Facility: MEDICAL CENTER | Age: 89
End: 2024-01-24
Payer: MEDICARE

## 2024-01-24 VITALS
DIASTOLIC BLOOD PRESSURE: 70 MMHG | RESPIRATION RATE: 18 BRPM | BODY MASS INDEX: 24.86 KG/M2 | SYSTOLIC BLOOD PRESSURE: 164 MMHG | HEART RATE: 88 BPM | OXYGEN SATURATION: 96 % | WEIGHT: 164 LBS | TEMPERATURE: 98.6 F | HEIGHT: 68 IN

## 2024-01-24 DIAGNOSIS — I44.0 1ST DEGREE AV BLOCK: ICD-10-CM

## 2024-01-24 DIAGNOSIS — I10 HTN (HYPERTENSION), BENIGN: Primary | ICD-10-CM

## 2024-01-24 PROCEDURE — 99214 OFFICE O/P EST MOD 30 MIN: CPT

## 2024-01-24 NOTE — PROGRESS NOTES
Assessment/Plan:    Return in 2 weeks for recheck blood pressure/awv.  Fasting labs due prior to visit.     1. HTN (hypertension), benign  Blood pressure elevated in office today at 164/70.  Prior history of hypertension listed on problem list however, patient denies history of hypertension or prior treatment.  Currently asymptomatic.  Will have patient return in 2 weeks for recheck in office, does not have home blood pressure cuff.    2. 1st degree AV block  As evidenced by ECG from hospital stay 12/7.  Asymptomatic.  Echocardiogram unremarkable overall with the exception of mild regurgitation and mild diastolic dysfunction.  Ejection fraction 60 to 65%.  Patient declines referral to cardiology.    Subjective:      Patient ID: Jeffery Keys is a 88 y.o. male.    88-year-old male presents for evaluation of elevated blood pressure reading by home health nurse.  He reports today was supposed to be the last day of his [facility] nurse check which was started after hospitalization last month.   During visit today, blood pressure was checked 6-8 times in each arm per patient and was told his BP is elevated and home nurse told him to go to the hospital in which he declined. He does not remember the BP readings. Currently asymptomatic, denies cp, sob, palpitations.   He does have a history of hypertension on his problem list but denies ever being diagnosed with or treated for HTN.   Discussed referring patient to Cardiologist due to syncope and 1st degree AV block found on ECG in Hospital last month, patient refused.         The following portions of the patient's history were reviewed and updated as appropriate: allergies, past family history, past medical history, past social history, past surgical history, and problem list.    Review of Systems   Constitutional: Negative.    HENT: Negative.     Eyes: Negative.    Respiratory: Negative.     Cardiovascular: Negative.    Gastrointestinal: Negative.    Endocrine: Negative.   "  Genitourinary: Negative.    Musculoskeletal: Negative.    Skin: Negative.    Allergic/Immunologic: Negative.    Neurological: Negative.    Hematological: Negative.    Psychiatric/Behavioral: Negative.           Objective:      /70 (BP Location: Right arm, Patient Position: Sitting)   Pulse 88   Temp 98.6 °F (37 °C)   Resp 18   Ht 5' 8\" (1.727 m)   Wt 74.4 kg (164 lb)   SpO2 96%   BMI 24.94 kg/m²          Physical Exam  Vitals and nursing note reviewed.   Constitutional:       General: He is not in acute distress.     Appearance: Normal appearance. He is not ill-appearing.   HENT:      Head: Normocephalic and atraumatic.   Cardiovascular:      Rate and Rhythm: Normal rate.      Pulses: Normal pulses.      Heart sounds: Normal heart sounds.   Pulmonary:      Effort: Pulmonary effort is normal.      Breath sounds: Normal breath sounds.   Skin:     General: Skin is warm and dry.   Neurological:      General: No focal deficit present.      Mental Status: He is alert. Mental status is at baseline.      Gait: Abnormal gait: ambulatory with use of cane.   Psychiatric:         Mood and Affect: Mood normal.         Thought Content: Thought content normal.                    DELILAH Mcpherson  "

## 2024-01-30 ENCOUNTER — RA CDI HCC (OUTPATIENT)
Dept: OTHER | Facility: HOSPITAL | Age: 89
End: 2024-01-30

## 2024-02-06 ENCOUNTER — OFFICE VISIT (OUTPATIENT)
Dept: FAMILY MEDICINE CLINIC | Facility: MEDICAL CENTER | Age: 89
End: 2024-02-06
Payer: MEDICARE

## 2024-02-06 VITALS
HEIGHT: 68 IN | BODY MASS INDEX: 25.67 KG/M2 | OXYGEN SATURATION: 98 % | WEIGHT: 169.4 LBS | DIASTOLIC BLOOD PRESSURE: 72 MMHG | TEMPERATURE: 97.9 F | HEART RATE: 69 BPM | SYSTOLIC BLOOD PRESSURE: 150 MMHG

## 2024-02-06 DIAGNOSIS — Z00.00 MEDICARE ANNUAL WELLNESS VISIT, SUBSEQUENT: Primary | ICD-10-CM

## 2024-02-06 DIAGNOSIS — N40.0 BENIGN PROSTATIC HYPERPLASIA WITHOUT LOWER URINARY TRACT SYMPTOMS: ICD-10-CM

## 2024-02-06 DIAGNOSIS — E11.9 TYPE 2 DIABETES MELLITUS WITHOUT COMPLICATION, WITH LONG-TERM CURRENT USE OF INSULIN (HCC): ICD-10-CM

## 2024-02-06 DIAGNOSIS — Z79.4 TYPE 2 DIABETES MELLITUS WITHOUT COMPLICATION, WITH LONG-TERM CURRENT USE OF INSULIN (HCC): ICD-10-CM

## 2024-02-06 PROCEDURE — G0438 PPPS, INITIAL VISIT: HCPCS

## 2024-02-06 RX ORDER — TAMSULOSIN HYDROCHLORIDE 0.4 MG/1
0.4 CAPSULE ORAL DAILY
Qty: 90 CAPSULE | Refills: 1 | Status: CANCELLED | OUTPATIENT
Start: 2024-02-06

## 2024-02-06 RX ORDER — TAMSULOSIN HYDROCHLORIDE 0.4 MG/1
0.4 CAPSULE ORAL DAILY
Qty: 90 CAPSULE | Refills: 1 | Status: SHIPPED | OUTPATIENT
Start: 2024-02-06

## 2024-02-06 NOTE — PATIENT INSTRUCTIONS
Medicare Preventive Visit Patient Instructions  Thank you for completing your Welcome to Medicare Visit or Medicare Annual Wellness Visit today. Your next wellness visit will be due in one year (2/6/2025).  The screening/preventive services that you may require over the next 5-10 years are detailed below. Some tests may not apply to you based off risk factors and/or age. Screening tests ordered at today's visit but not completed yet may show as past due. Also, please note that scanned in results may not display below.  Preventive Screenings:  Service Recommendations Previous Testing/Comments   Colorectal Cancer Screening  Colonoscopy    Fecal Occult Blood Test (FOBT)/Fecal Immunochemical Test (FIT)  Fecal DNA/Cologuard Test  Flexible Sigmoidoscopy Age: 45-75 years old   Colonoscopy: every 10 years (May be performed more frequently if at higher risk)  OR  FOBT/FIT: every 1 year  OR  Cologuard: every 3 years  OR  Sigmoidoscopy: every 5 years  Screening may be recommended earlier than age 45 if at higher risk for colorectal cancer. Also, an individualized decision between you and your healthcare provider will decide whether screening between the ages of 76-85 would be appropriate. Colonoscopy: Not on file  FOBT/FIT: Not on file  Cologuard: Not on file  Sigmoidoscopy: Not on file    Screening Not Indicated     Prostate Cancer Screening Individualized decision between patient and health care provider in men between ages of 55-69   Medicare will cover every 12 months beginning on the day after your 50th birthday PSA: No results in last 5 years     Screening Not Indicated     Hepatitis C Screening Once for adults born between 1945 and 1965  More frequently in patients at high risk for Hepatitis C Hep C Antibody: Not on file        Diabetes Screening 1-2 times per year if you're at risk for diabetes or have pre-diabetes Fasting glucose: No results in last 5 years (No results in last 5 years)  A1C: 7.0 %  (1/30/2024)  Screening Not Indicated  History Diabetes   Cholesterol Screening Once every 5 years if you don't have a lipid disorder. May order more often based on risk factors. Lipid panel: 01/30/2024  Screening Not Indicated  History Lipid Disorder      Other Preventive Screenings Covered by Medicare:  Abdominal Aortic Aneurysm (AAA) Screening: covered once if your at risk. You're considered to be at risk if you have a family history of AAA or a male between the age of 65-75 who smoking at least 100 cigarettes in your lifetime.  Lung Cancer Screening: covers low dose CT scan once per year if you meet all of the following conditions: (1) Age 55-77; (2) No signs or symptoms of lung cancer; (3) Current smoker or have quit smoking within the last 15 years; (4) You have a tobacco smoking history of at least 20 pack years (packs per day x number of years you smoked); (5) You get a written order from a healthcare provider.  Glaucoma Screening: covered annually if you're considered high risk: (1) You have diabetes OR (2) Family history of glaucoma OR (3)  aged 50 and older OR (4)  American aged 65 and older  Osteoporosis Screening: covered every 2 years if you meet one of the following conditions: (1) Have a vertebral abnormality; (2) On glucocorticoid therapy for more than 3 months; (3) Have primary hyperparathyroidism; (4) On osteoporosis medications and need to assess response to drug therapy.  HIV Screening: covered annually if you're between the age of 15-65. Also covered annually if you are younger than 15 and older than 65 with risk factors for HIV infection. For pregnant patients, it is covered up to 3 times per pregnancy.    Immunizations:  Immunization Recommendations   Influenza Vaccine Annual influenza vaccination during flu season is recommended for all persons aged >= 6 months who do not have contraindications   Pneumococcal Vaccine   * Pneumococcal conjugate vaccine = PCV13 (Prevnar  13), PCV15 (Vaxneuvance), PCV20 (Prevnar 20)  * Pneumococcal polysaccharide vaccine = PPSV23 (Pneumovax) Adults 19-65 yo with certain risk factors or if 65+ yo  If never received any pneumonia vaccine: recommend Prevnar 20 (PCV20)  Give PCV20 if previously received 1 dose of PCV13 or PPSV23   Hepatitis B Vaccine 3 dose series if at intermediate or high risk (ex: diabetes, end stage renal disease, liver disease)   Respiratory syncytial virus (RSV) Vaccine - COVERED BY MEDICARE PART D  * RSVPreF3 (Arexvy) CDC recommends that adults 60 years of age and older may receive a single dose of RSV vaccine using shared clinical decision-making (SCDM)   Tetanus (Td) Vaccine - COST NOT COVERED BY MEDICARE PART B Following completion of primary series, a booster dose should be given every 10 years to maintain immunity against tetanus. Td may also be given as tetanus wound prophylaxis.   Tdap Vaccine - COST NOT COVERED BY MEDICARE PART B Recommended at least once for all adults. For pregnant patients, recommended with each pregnancy.   Shingles Vaccine (Shingrix) - COST NOT COVERED BY MEDICARE PART B  2 shot series recommended in those 19 years and older who have or will have weakened immune systems or those 50 years and older     Health Maintenance Due:  There are no preventive care reminders to display for this patient.  Immunizations Due:      Topic Date Due   • COVID-19 Vaccine (6 - 2023-24 season) 09/01/2023     Advance Directives   What are advance directives?  Advance directives are legal documents that state your wishes and plans for medical care. These plans are made ahead of time in case you lose your ability to make decisions for yourself. Advance directives can apply to any medical decision, such as the treatments you want, and if you want to donate organs.   What are the types of advance directives?  There are many types of advance directives, and each state has rules about how to use them. You may choose a  combination of any of the following:  Living will:  This is a written record of the treatment you want. You can also choose which treatments you do not want, which to limit, and which to stop at a certain time. This includes surgery, medicine, IV fluid, and tube feedings.   Durable power of  for healthcare (DPAHC):  This is a written record that states who you want to make healthcare choices for you when you are unable to make them for yourself. This person, called a proxy, is usually a family member or a friend. You may choose more than 1 proxy.  Do not resuscitate (DNR) order:  A DNR order is used in case your heart stops beating or you stop breathing. It is a request not to have certain forms of treatment, such as CPR. A DNR order may be included in other types of advance directives.  Medical directive:  This covers the care that you want if you are in a coma, near death, or unable to make decisions for yourself. You can list the treatments you want for each condition. Treatment may include pain medicine, surgery, blood transfusions, dialysis, IV or tube feedings, and a ventilator (breathing machine).  Values history:  This document has questions about your views, beliefs, and how you feel and think about life. This information can help others choose the care that you would choose.  Why are advance directives important?  An advance directive helps you control your care. Although spoken wishes may be used, it is better to have your wishes written down. Spoken wishes can be misunderstood, or not followed. Treatments may be given even if you do not want them. An advance directive may make it easier for your family to make difficult choices about your care.   Fall Prevention    Fall prevention  includes ways to make your home and other areas safer. It also includes ways you can move more carefully to prevent a fall. Health conditions that cause changes in your blood pressure, vision, or muscle strength and  coordination may increase your risk for falls. Medicines may also increase your risk for falls if they make you dizzy, weak, or sleepy.   Fall prevention tips:   Stand or sit up slowly.    Use assistive devices as directed.    Wear shoes that fit well and have soles that .    Wear a personal alarm.    Stay active.    Manage your medical conditions.    Home Safety Tips:  Add items to prevent falls in the bathroom.    Keep paths clear.    Install bright lights in your home.    Keep items you use often on shelves within reach.    Paint or place reflective tape on the edges of your stairs.    Weight Management   Why it is important to manage your weight:  Being overweight increases your risk of health conditions such as heart disease, high blood pressure, type 2 diabetes, and certain types of cancer. It can also increase your risk for osteoarthritis, sleep apnea, and other respiratory problems. Aim for a slow, steady weight loss. Even a small amount of weight loss can lower your risk of health problems.  How to lose weight safely:  A safe and healthy way to lose weight is to eat fewer calories and get regular exercise. You can lose up about 1 pound a week by decreasing the number of calories you eat by 500 calories each day.   Healthy meal plan for weight management:  A healthy meal plan includes a variety of foods, contains fewer calories, and helps you stay healthy. A healthy meal plan includes the following:  Eat whole-grain foods more often.  A healthy meal plan should contain fiber. Fiber is the part of grains, fruits, and vegetables that is not broken down by your body. Whole-grain foods are healthy and provide extra fiber in your diet. Some examples of whole-grain foods are whole-wheat breads and pastas, oatmeal, brown rice, and bulgur.  Eat a variety of vegetables every day.  Include dark, leafy greens such as spinach, kale, emerald greens, and mustard greens. Eat yellow and orange vegetables such as  carrots, sweet potatoes, and winter squash.   Eat a variety of fruits every day.  Choose fresh or canned fruit (canned in its own juice or light syrup) instead of juice. Fruit juice has very little or no fiber.  Eat low-fat dairy foods.  Drink fat-free (skim) milk or 1% milk. Eat fat-free yogurt and low-fat cottage cheese. Try low-fat cheeses such as mozzarella and other reduced-fat cheeses.  Choose meat and other protein foods that are low in fat.  Choose beans or other legumes such as split peas or lentils. Choose fish, skinless poultry (chicken or turkey), or lean cuts of red meat (beef or pork). Before you cook meat or poultry, cut off any visible fat.   Use less fat and oil.  Try baking foods instead of frying them. Add less fat, such as margarine, sour cream, regular salad dressing and mayonnaise to foods. Eat fewer high-fat foods. Some examples of high-fat foods include french fries, doughnuts, ice cream, and cakes.  Eat fewer sweets.  Limit foods and drinks that are high in sugar. This includes candy, cookies, regular soda, and sweetened drinks.  Exercise:  Exercise at least 30 minutes per day on most days of the week. Some examples of exercise include walking, biking, dancing, and swimming. You can also fit in more physical activity by taking the stairs instead of the elevator or parking farther away from stores. Ask your healthcare provider about the best exercise plan for you.      © Copyright [employer] 2018 Information is for End User's use only and may not be sold, redistributed or otherwise used for commercial purposes. All illustrations and images included in CareNotes® are the copyrighted property of [employer] or [employer]

## 2024-02-06 NOTE — PROGRESS NOTES
Assessment and Plan:   Refills for tamsulosin and metformin sent to pharmacy per patient request.  Return in 3 months for follow-up, sooner if needed.  Problem List Items Addressed This Visit     DM2 (diabetes mellitus, type 2) ([facility])    Relevant Medications    metFORMIN (GLUCOPHAGE) 850 mg tablet   Other Visit Diagnoses     Medicare annual wellness visit, subsequent    -  Primary    Benign prostatic hyperplasia without lower urinary tract symptoms        Relevant Medications    tamsulosin (FLOMAX) 0.4 mg          Depression Screening and Follow-up Plan: Patient's depression screening was positive with a PHQ-2 score of 6. Their PHQ-9 score was 17. Patient assessed for underlying major depression. Brief counseling provided and recommend additional follow-up/re-evaluation next office visit. Patient's symptoms appear to be related to aging, feeling as he is unable to do as much physically as he use to, lives alone, feels lonely. He is not interested in information on day/senior programs, he does not wish to take medication for depression either. He does get out of his apartment, walks around the town, goes shopping. Neighbors bring him dinner every day.       Preventive health issues were discussed with patient, and age appropriate screening tests were ordered as noted in patient's After Visit Summary.  Personalized health advice and appropriate referrals for health education or preventive services given if needed, as noted in patient's After Visit Summary.     History of Present Illness:     Patient presents for a Medicare Wellness Visit    88-year-old male presents for Medicare wellness visit.  He lives alone in [place] in his own apartment.  He does have neighbors that check on him and bring him dinner daily.  He does still drive unless he is not feeling well, then he will have his neighbors drive him to and from his appointments.  He is ambulatory with use of cane, walks around his neighborhood frequently, daily is  able to go to the pharmacy and do his own shopping and prepares his own breakfast and lunch.  He is screening high on his PHQ, when addressed in office he attributes this to feeling lonely, not able to do as much due to aging.  He is not interested in any day/senior program information or in taking medication for depression at this time.   He is requesting refills today for his metformin and Flomax.  He offers no other concerns or complaints at this time.  He does have a history listed in his chart of hypertension, it looks like he was previously on lisinopril many years ago.  He is not currently taking any antihypertensive medications and does not wish to add any more medications to his list at this time. We will recheck at future office visit in 3 months.       Patient Care Team:  DELILAH Mcpherson as PCP - General (Nurse Practitioner)     Review of Systems:     Review of Systems   Constitutional: Negative.    HENT: Negative.     Eyes: Negative.    Respiratory: Negative.     Cardiovascular: Negative.    Gastrointestinal: Negative.    Endocrine: Negative.    Genitourinary: Negative.    Musculoskeletal: Negative.    Skin: Negative.    Allergic/Immunologic: Negative.    Neurological: Negative.    Hematological: Negative.    Psychiatric/Behavioral: Negative.  Negative for suicidal ideas.         Problem List:     Patient Active Problem List   Diagnosis   • Epididymo-orchitis   • DM2 (diabetes mellitus, type 2) (HCC)   • HTN (hypertension), benign   • HLD (hyperlipidemia)   • Ambulatory dysfunction   • Syncope   • Left inguinal hernia      Past Medical and Surgical History:     Past Medical History:   Diagnosis Date   • Cardiac disease    • Diabetes mellitus (HCC)    • Hyperlipidemia    • Hypertension    • Hyperthyroidism      Past Surgical History:   Procedure Laterality Date   • CHOLECYSTECTOMY     • HERNIA REPAIR        Family History:     Family History   Problem Relation Age of Onset   • Heart disease Family        Social History:     Social History     Socioeconomic History   • Marital status:      Spouse name: None   • Number of children: 3   • Years of education: 9   • Highest education level: None   Occupational History   • Occupation: Retired   Tobacco Use   • Smoking status: Former     Types: Cigarettes   • Smokeless tobacco: Never   • Tobacco comments:     smoked since age 18 per [place]   Vaping Use   • Vaping status: Never Used   Substance and Sexual Activity   • Alcohol use: No   • Drug use: No   • Sexual activity: Not Currently   Other Topics Concern   • None   Social History Narrative    Lives in an apartment in [place], has a friend in the same apartment complex with whom he frequents    · Most recent tobacco use screenin2019      · Do you currently or have you served in the [email or web address]:   No      · Were you activated, into active duty, as a member of the [employer] or as a Reservist:   No      · Diet:   Regular       · Exercise level:   None      · Live alone or with others:   alone      · Single or multi-level home/work:   single level home        · Able to care for self:   Yes      · Deaf or serious difficulty hearing:   Yes      · Blind or serious difficulty seeing:   Yes      · Difficulty concentrating, remembering or making decisions:   Yes      · Difficulty walking or climbing stairs:   Yes      · Difficulty dressing or bathing:   No      · Difficulty doing errands alone:   No      · Relationship status:         · Transportation difficulties:   No       · Sexual orientation:   Heterosexual      · General stress level:   Low      · Caffeine intake:   Occasional       · Guns present in home:   No      · Seat belts used routinely:   Yes      · Sunscreen used routinely:   Yes      · Smoke alarm in home:   Yes      · Advance directive:   Yes      · Are there stairs in your home:   Yes      · International travel:   no      · Pets:   No      · Advance directive - Provider has  reviewed directives and consents to follow them (insert provider name with any objections in notes field):   Yes      · How many days in the past year have you had a heavy drinking consumption (4+ female, 5+ male):   0      · Urinary incontinence assessment performed:   No      · Presence of domestic violence:   No      · Performs monthly self-breast exam:   No      · Do you feel safe at home:   Yes      · Passive smoke exposure:   No      · Hard of hearing or deaf in one or both ears:   Yes      · Legally blind in one or both eyes:   No      · Advanced Directive:   yes      · How many days of moderate to strenuous exercise, like a brisk walk, did you do in the last 7 days:    Declined      · On those days that you engage in moderate to strenuous exercise, how many minutes, on average, do you exercise:    Declined      · How hard is it for you to pay for the very basics like food, housing, medical care, and heating:    Declined      · Do you feel stress - tense, restless, nervous, or anxious, or unable to sleep at night because your mind is troubled all the time - these days:    Declined      Social Determinants of Health     Financial Resource Strain: Low Risk  (2/6/2024)    Overall Financial Resource Strain (CARDIA)    • Difficulty of Paying Living Expenses: Not very hard   Food Insecurity: No Food Insecurity (12/7/2023)    Received from [facility]    Hunger Vital Sign    • Worried About Running Out of Food in the Last Year: Never true    • Ran Out of Food in the Last Year: Never true   Transportation Needs: No Transportation Needs (2/6/2024)    PRAPARE - Transportation    • Lack of Transportation (Medical): No    • Lack of Transportation (Non-Medical): No   Physical Activity: Not on file   Stress: Not on file   Social Connections: Not on file   Intimate Partner Violence: Not At Risk (12/7/2023)    Received from [facility]    Humiliation, Afraid, Rape, and Kick questionnaire     • Fear of Current or Ex-Partner: No    • Emotionally Abused: No    • Physically Abused: No    • Sexually Abused: No   Housing Stability: Low Risk  (12/7/2023)    Received from [facility]    Housing Stability Vital Sign    • Unable to Pay for Housing in the Last Year: No    • Number of Places Lived in the Last Year: 1    • Unstable Housing in the Last Year: No      Medications and Allergies:     Current Outpatient Medications   Medication Sig Dispense Refill   • aspirin (ECOTRIN LOW STRENGTH) 81 mg EC tablet Take 81 mg by mouth daily     • insulin detemir (LEVEMIR FLEXTOUCH) 100 Units/mL injection pen Inject 25 Units under the skin daily     • meclizine (ANTIVERT) 12.5 MG tablet Take by mouth 3 (three) times a day as needed for dizziness     • metFORMIN (GLUCOPHAGE) 850 mg tablet Take 1 tablet (850 mg total) by mouth 2 (two) times a day with meals 180 tablet 1   • rosuvastatin (CRESTOR) 5 mg tablet Take 1 tablet (5 mg total) by mouth daily 90 tablet 0   • tamsulosin (FLOMAX) 0.4 mg Take 1 capsule (0.4 mg total) by mouth daily 90 capsule 1   • latanoprost (XALATAN) 0.005 % ophthalmic solution Administer 1 drop to both eyes daily at bedtime       No current facility-administered medications for this visit.     No Known Allergies   Immunizations:     Immunization History   Administered Date(s) Administered   • COVID-19 MODERNA VACC 0.5 ML IM 04/05/2021, 05/03/2021, 09/01/2021, 04/01/2022   • COVID-19 Moderna Vac BIVALENT 12 Yr+ IM 0.5 ML 09/30/2022   • INFLUENZA 09/11/2023   • Pneumococcal Conjugate 13-Valent 11/27/2015   • Pneumococcal Polysaccharide PPV23 05/08/2017   • Tdap 09/14/2017      Health Maintenance:     There are no preventive care reminders to display for this patient.      Topic Date Due   • COVID-19 Vaccine (6 - 2023-24 season) 09/01/2023      Medicare Screening Tests and Risk Assessments:     Jeffery is here for his Subsequent Wellness visit.     Health Risk Assessment:   Patient rates  "overall health as poor. Patient feels that their physical health rating is slightly worse. Patient is very dissatisfied with their life. Eyesight was rated as same. Hearing was rated as same. Patient feels that their emotional and mental health rating is slightly worse. Patients states they are sometimes angry. Patient states they are always unusually tired/fatigued. Pain experienced in the last 7 days has been none. Patient states that he has experienced no weight loss or gain in last 6 months. Feels as though health is declining because he is unable to do the things he use to do, tires easily, not able to walk outdoors as much as he use to.   He feels depressed and lonely. He lives along in an apartment. He does still drive sometimes, other times his neighbor brings him to his visits. His son lives in [place] but does not see him much at all. He does not have any grandchildren. He does go out and walks around his neighborhood or goes to the local food shops for lunch. He does not wish to attend a day program and does not want information on this. He states \"that's not for me.\"      Depression Screening:   PHQ-2 Score: 6  PHQ-9 Score: 17      Fall Risk Screening:   In the past year, patient has experienced: history of falling in past year    Number of falls: 2 or more    Home Safety:  Patient has trouble with stairs inside or outside of their home. Patient has working smoke alarms and has working carbon monoxide detector. Home safety hazards include: none. Ambulatory with use of cane. Able to get around ok with his cane, goes out and walks in his neighborhood, goes to the store, food shops. He did have a fall last year in the summer but has not fallen since.       Nutrition:   Current diet is Regular.     Medications:   Patient is not currently taking any over-the-counter supplements. Patient is able to manage medications.     Activities of Daily Living (ADLs)/Instrumental Activities of Daily Living (IADLs): "   Walk and transfer into and out of bed and chair?: Yes  Dress and groom yourself?: Yes    Bathe or shower yourself?: Yes    Feed yourself? Yes  Do your laundry/housekeeping?: Yes  Manage your money, pay your bills and track your expenses?: Yes  Make your own meals?: No    Do your own shopping?: Yes    ADL comments: Makes breakfast for himself, makes sandwich for lunch and neighbors bring him dinner daily. They also help him with transport if needed.     Previous Hospitalizations:   Any hospitalizations or ED visits within the last 12 months?: Yes    How many hospitalizations have you had in the last year?: 3-4    Advance Care Planning:   Living will: No    Durable POA for healthcare: No    Advanced directive: No      Comments: Patient tells me he wishes to be cremated, does not wish to obtain a living will or POA. Son lives in [place].     Cognitive Screening:   Provider or family/friend/caregiver concerned regarding cognition?: No    PREVENTIVE SCREENINGS      Cardiovascular Screening:    General: History Lipid Disorder      Diabetes Screening:     General: History Diabetes      Colorectal Cancer Screening:     General: Screening Not Indicated      Prostate Cancer Screening:    General: Screening Not Indicated      Abdominal Aortic Aneurysm (AAA) Screening:    Risk factors include: tobacco use        General: Screening Not Indicated      Lung Cancer Screening:     General: Screening Not Indicated    Screening, Brief Intervention, and Referral to Treatment (SBIRT)    Screening  Typical number of drinks in a day: 0  Typical number of drinks in a week: 0  Interpretation: Low risk drinking behavior.    Single Item Drug Screening:  How often have you used an illegal drug (including marijuana) or a prescription medication for non-medical reasons in the past year? never    Single Item Drug Screen Score: 0  Interpretation: Negative screen for possible drug use disorder    Other Counseling Topics:   Car/seat  "belt/driving safety, skin self-exam, sunscreen and regular weightbearing exercise and calcium and vitamin D intake. Multiple irregularly shaped, brown, raised skin lesions noted to face.  Patient does not use sunscreen but does wear a hat.  Declines referral to dermatology for further evaluation of these lesions.    No results found.     Physical Exam:     /72   Pulse 69   Temp 97.9 °F (36.6 °C) (Temporal)   Ht 5' 8\" (1.727 m)   Wt 76.8 kg (169 lb 6.4 oz)   SpO2 98%   BMI 25.76 kg/m²     Physical Exam  Vitals and nursing note reviewed.   Constitutional:       General: He is not in acute distress.     Appearance: Normal appearance. He is not ill-appearing.   HENT:      Head: Normocephalic and atraumatic.   Cardiovascular:      Rate and Rhythm: Normal rate.   Pulmonary:      Effort: Pulmonary effort is normal. No respiratory distress.   Skin:     Findings: Lesion (multiple raised, irregularly shaped lesions noted to face.) present.   Neurological:      General: No focal deficit present.      Mental Status: He is alert. Mental status is at baseline.   Psychiatric:         Mood and Affect: Mood normal.         Thought Content: Thought content normal.          DELILAH Mcpherson  "

## 2024-03-12 ENCOUNTER — TELEPHONE (OUTPATIENT)
Dept: FAMILY MEDICINE CLINIC | Facility: MEDICAL CENTER | Age: 89
End: 2024-03-12

## 2024-03-12 NOTE — TELEPHONE ENCOUNTER
Pt stopped by office to make Stefanie aware his [employer] will no longer cover his levemir.  Letter sent to [employer] to be attached to his chart.  They will supply him with a temporary supply.  You will either be able to prescribe something different or the prior auth dept can start a prior auth request.  Pt just wanted you to know that it has to be a pre-filled pen because he can't fill the needles.    Routed to Stefanie

## 2024-03-14 NOTE — TELEPHONE ENCOUNTER
PA for insulin detemir (LEVEMIR FLEXTOUCH) 100 Units/mL injection pen     Submitted via    [x]CMM-KEY [id number]  []Surescripts-Case ID #   []Faxed to plan   []Other website   []Phone call Case ID #     Office notes sent, clinical questions answered. Awaiting determination    Turnaround time for your insurance to make a decision on your Prior Authorization can take 7-21 business days.

## 2024-03-15 NOTE — TELEPHONE ENCOUNTER
PA for insulin detemir (LEVEMIR FLEXTOUCH) 100 Units/mL injection pen Approved     Date(s) approved 1/1/2024 - 3/14/2025      Patient advised by [] [employer] Message                      [x] Phone call       Pharmacy advised by [x]Fax                                     []Phone call    Approval letter scanned into Media Yes

## 2024-03-27 ENCOUNTER — OFFICE VISIT (OUTPATIENT)
Dept: FAMILY MEDICINE CLINIC | Facility: MEDICAL CENTER | Age: 89
End: 2024-03-27
Payer: MEDICARE

## 2024-03-27 VITALS
HEART RATE: 65 BPM | DIASTOLIC BLOOD PRESSURE: 80 MMHG | BODY MASS INDEX: 26.3 KG/M2 | WEIGHT: 173 LBS | OXYGEN SATURATION: 98 % | SYSTOLIC BLOOD PRESSURE: 120 MMHG | TEMPERATURE: 97.5 F

## 2024-03-27 DIAGNOSIS — L85.3 DRY SKIN: Primary | ICD-10-CM

## 2024-03-27 PROCEDURE — 99213 OFFICE O/P EST LOW 20 MIN: CPT

## 2024-03-27 NOTE — PROGRESS NOTES
Assessment/Plan:       1. Dry skin  Advised patient to keep skin well moisturized twice daily with either Aveeno or Eucerin cream.  Also advised to use hydrocortisone to right forearm rash.  Call the office if worsening or no improvement.      Subjective:      Patient ID: Jeffery Keys is a 88 y.o. male.    88-year-old male presents for evaluation of possible bed bugs.  He reports itchy area to left buttock and right forearm that he noticed about 1 week ago while sitting in his chair at home.  He was advised by his landlord to come to his doctor for an evaluation.  He also tells me that he has someone coming to his apartment tomorrow to check for bed bugs. He has not seen any bed bugs anywhere in his apartment.   No other concerns.         The following portions of the patient's history were reviewed and updated as appropriate: allergies, current medications, past medical history, past social history, past surgical history, and problem list.    Review of Systems   Constitutional: Negative.    HENT: Negative.     Eyes: Negative.    Respiratory: Negative.     Cardiovascular: Negative.    Gastrointestinal: Negative.    Endocrine: Negative.    Genitourinary: Negative.    Musculoskeletal: Negative.    Skin:  Positive for rash.   Allergic/Immunologic: Negative.    Neurological: Negative.    Hematological: Negative.    Psychiatric/Behavioral: Negative.           Objective:      /80 (BP Location: Left arm, Patient Position: Sitting, Cuff Size: Large)   Pulse 65   Temp 97.5 °F (36.4 °C) (Temporal)   Wt 78.5 kg (173 lb)   SpO2 98%   BMI 26.30 kg/m²          Physical Exam  Vitals and nursing note reviewed.   Constitutional:       General: He is not in acute distress.     Appearance: Normal appearance. He is not ill-appearing.   Pulmonary:      Effort: Pulmonary effort is normal.   Skin:     Comments: 1 small circular red rough patch of dry skin noted to right forearm approx. Size of quarter. Appears consistent with  dry skin/eczema. No other areas of redness or rash noted to body.   Neurological:      Mental Status: He is alert. Mental status is at baseline.   Psychiatric:         Mood and Affect: Mood normal.         Behavior: Behavior normal.                    DELILAH Mcpherson

## 2024-04-22 DIAGNOSIS — E78.5 HYPERLIPIDEMIA, UNSPECIFIED HYPERLIPIDEMIA TYPE: ICD-10-CM

## 2024-04-22 RX ORDER — ROSUVASTATIN CALCIUM 5 MG/1
5 TABLET, COATED ORAL DAILY
Qty: 90 TABLET | Refills: 1 | Status: SHIPPED | OUTPATIENT
Start: 2024-04-22

## 2024-04-22 NOTE — TELEPHONE ENCOUNTER
Reason for call:   [x] Refill   [] Prior Auth  [] Other:     Office:   [x] PCP/Provider - Vikash WILLIAM   [] Specialty/Provider -     Medication: Crestor     Dose/Frequency: 5mg - take 1 tablet by mouth daily     Quantity: 90    Pharmacy: [id number]     Does the patient have enough for 3 days?   [] Yes   [x] No - Send as HP to POD

## 2024-04-27 ENCOUNTER — HOSPITAL ENCOUNTER (INPATIENT)
Facility: HOSPITAL | Age: 89
LOS: 6 days | Discharge: NURSING, REHAB OR LONG-TERM CARE | DRG: 871 | End: 2024-05-03
Attending: EMERGENCY MEDICINE | Admitting: INTERNAL MEDICINE
Payer: MEDICARE

## 2024-04-27 ENCOUNTER — APPOINTMENT (EMERGENCY)
Dept: CT IMAGING | Facility: HOSPITAL | Age: 89
DRG: 871 | End: 2024-04-27
Payer: MEDICARE

## 2024-04-27 ENCOUNTER — APPOINTMENT (EMERGENCY)
Dept: RADIOLOGY | Facility: HOSPITAL | Age: 89
DRG: 871 | End: 2024-04-27
Payer: MEDICARE

## 2024-04-27 DIAGNOSIS — G93.41 ACUTE METABOLIC ENCEPHALOPATHY: ICD-10-CM

## 2024-04-27 DIAGNOSIS — R14.0 BLOATING SYMPTOM: ICD-10-CM

## 2024-04-27 DIAGNOSIS — Z79.4 TYPE 2 DIABETES MELLITUS WITH HYPERGLYCEMIA, WITH LONG-TERM CURRENT USE OF INSULIN (HCC): ICD-10-CM

## 2024-04-27 DIAGNOSIS — A41.9 SEPSIS (HCC): ICD-10-CM

## 2024-04-27 DIAGNOSIS — E11.65 TYPE 2 DIABETES MELLITUS WITH HYPERGLYCEMIA, WITH LONG-TERM CURRENT USE OF INSULIN (HCC): ICD-10-CM

## 2024-04-27 DIAGNOSIS — R78.81 BACTEREMIA: ICD-10-CM

## 2024-04-27 DIAGNOSIS — R41.89 COGNITIVE IMPAIRMENT: ICD-10-CM

## 2024-04-27 DIAGNOSIS — R45.89 DYSPHORIC MOOD: ICD-10-CM

## 2024-04-27 DIAGNOSIS — N30.90 CYSTITIS: ICD-10-CM

## 2024-04-27 DIAGNOSIS — N30.00 ACUTE CYSTITIS WITHOUT HEMATURIA: ICD-10-CM

## 2024-04-27 DIAGNOSIS — G93.40 ENCEPHALOPATHY: Primary | ICD-10-CM

## 2024-04-27 PROBLEM — N39.0 UTI (URINARY TRACT INFECTION): Status: ACTIVE | Noted: 2024-04-27

## 2024-04-27 PROBLEM — R65.10 SIRS (SYSTEMIC INFLAMMATORY RESPONSE SYNDROME) (HCC): Status: ACTIVE | Noted: 2017-08-05

## 2024-04-27 LAB
ALBUMIN SERPL BCP-MCNC: 4 G/DL (ref 3.5–5)
ALP SERPL-CCNC: 73 U/L (ref 34–104)
ALT SERPL W P-5'-P-CCNC: 28 U/L (ref 7–52)
ANION GAP SERPL CALCULATED.3IONS-SCNC: 6 MMOL/L (ref 4–13)
APAP SERPL-MCNC: <2 UG/ML (ref 10–20)
APTT PPP: 31 SECONDS (ref 23–37)
AST SERPL W P-5'-P-CCNC: 28 U/L (ref 13–39)
BACTERIA UR QL AUTO: ABNORMAL /HPF
BASOPHILS # BLD MANUAL: 0 THOUSAND/UL (ref 0–0.1)
BASOPHILS NFR MAR MANUAL: 0 % (ref 0–1)
BILIRUB SERPL-MCNC: 1.56 MG/DL (ref 0.2–1)
BILIRUB UR QL STRIP: NEGATIVE
BUN SERPL-MCNC: 25 MG/DL (ref 5–25)
CALCIUM SERPL-MCNC: 9.2 MG/DL (ref 8.4–10.2)
CHLORIDE SERPL-SCNC: 106 MMOL/L (ref 96–108)
CLARITY UR: CLEAR
CO2 SERPL-SCNC: 24 MMOL/L (ref 21–32)
COLOR UR: ABNORMAL
CREAT SERPL-MCNC: 1.11 MG/DL (ref 0.6–1.3)
EOSINOPHIL # BLD MANUAL: 0 THOUSAND/UL (ref 0–0.4)
EOSINOPHIL NFR BLD MANUAL: 0 % (ref 0–6)
ERYTHROCYTE [DISTWIDTH] IN BLOOD BY AUTOMATED COUNT: 13.2 % (ref 11.6–15.1)
ETHANOL SERPL-MCNC: <10 MG/DL
GFR SERPL CREATININE-BSD FRML MDRD: 59 ML/MIN/1.73SQ M
GLUCOSE SERPL-MCNC: 307 MG/DL (ref 65–140)
GLUCOSE SERPL-MCNC: 320 MG/DL (ref 65–140)
GLUCOSE UR STRIP-MCNC: ABNORMAL MG/DL
HCT VFR BLD AUTO: 45.9 % (ref 36.5–49.3)
HGB BLD-MCNC: 15.9 G/DL (ref 12–17)
HGB UR QL STRIP.AUTO: ABNORMAL
INR PPP: 1.08 (ref 0.84–1.19)
KETONES UR STRIP-MCNC: ABNORMAL MG/DL
LACTATE SERPL-SCNC: 1.6 MMOL/L (ref 0.5–2)
LEUKOCYTE ESTERASE UR QL STRIP: ABNORMAL
LYMPHOCYTES # BLD AUTO: 0.33 THOUSAND/UL (ref 0.6–4.47)
LYMPHOCYTES # BLD AUTO: 2 % (ref 14–44)
MCH RBC QN AUTO: 31.7 PG (ref 26.8–34.3)
MCHC RBC AUTO-ENTMCNC: 34.6 G/DL (ref 31.4–37.4)
MCV RBC AUTO: 92 FL (ref 82–98)
MONOCYTES # BLD AUTO: 1 THOUSAND/UL (ref 0–1.22)
MONOCYTES NFR BLD: 6 % (ref 4–12)
NEUTROPHILS # BLD MANUAL: 15.35 THOUSAND/UL (ref 1.85–7.62)
NEUTS BAND NFR BLD MANUAL: 1 % (ref 0–8)
NEUTS SEG NFR BLD AUTO: 91 % (ref 43–75)
NITRITE UR QL STRIP: NEGATIVE
NON-SQ EPI CELLS URNS QL MICRO: ABNORMAL /HPF
PH UR STRIP.AUTO: 5.5 [PH]
PLATELET # BLD AUTO: 196 THOUSANDS/UL (ref 149–390)
PLATELET BLD QL SMEAR: ADEQUATE
PMV BLD AUTO: 9.6 FL (ref 8.9–12.7)
POTASSIUM SERPL-SCNC: 4.5 MMOL/L (ref 3.5–5.3)
PROCALCITONIN SERPL-MCNC: 1.05 NG/ML
PROT SERPL-MCNC: 6.4 G/DL (ref 6.4–8.4)
PROT UR STRIP-MCNC: ABNORMAL MG/DL
PROTHROMBIN TIME: 14.7 SECONDS (ref 11.6–14.5)
RBC # BLD AUTO: 5.01 MILLION/UL (ref 3.88–5.62)
RBC #/AREA URNS AUTO: ABNORMAL /HPF
RBC MORPH BLD: NORMAL
SALICYLATES SERPL-MCNC: <5 MG/DL (ref 3–20)
SODIUM SERPL-SCNC: 136 MMOL/L (ref 135–147)
SP GR UR STRIP.AUTO: 1.02 (ref 1–1.03)
TOXIC GRANULES BLD QL SMEAR: PRESENT
UROBILINOGEN UR STRIP-ACNC: <2 MG/DL
WBC # BLD AUTO: 16.68 THOUSAND/UL (ref 4.31–10.16)
WBC #/AREA URNS AUTO: ABNORMAL /HPF
WBC TOXIC VACUOLES BLD QL SMEAR: PRESENT

## 2024-04-27 PROCEDURE — 96365 THER/PROPH/DIAG IV INF INIT: CPT

## 2024-04-27 PROCEDURE — 85610 PROTHROMBIN TIME: CPT

## 2024-04-27 PROCEDURE — 81001 URINALYSIS AUTO W/SCOPE: CPT

## 2024-04-27 PROCEDURE — 80053 COMPREHEN METABOLIC PANEL: CPT

## 2024-04-27 PROCEDURE — 99222 1ST HOSP IP/OBS MODERATE 55: CPT | Performed by: NURSE PRACTITIONER

## 2024-04-27 PROCEDURE — 96367 TX/PROPH/DG ADDL SEQ IV INF: CPT

## 2024-04-27 PROCEDURE — 71045 X-RAY EXAM CHEST 1 VIEW: CPT

## 2024-04-27 PROCEDURE — 83605 ASSAY OF LACTIC ACID: CPT

## 2024-04-27 PROCEDURE — 74177 CT ABD & PELVIS W/CONTRAST: CPT

## 2024-04-27 PROCEDURE — 99285 EMERGENCY DEPT VISIT HI MDM: CPT

## 2024-04-27 PROCEDURE — 87186 SC STD MICRODIL/AGAR DIL: CPT

## 2024-04-27 PROCEDURE — 87154 CUL TYP ID BLD PTHGN 6+ TRGT: CPT

## 2024-04-27 PROCEDURE — 0241U HB NFCT DS VIR RESP RNA 4 TRGT: CPT | Performed by: NURSE PRACTITIONER

## 2024-04-27 PROCEDURE — 82077 ASSAY SPEC XCP UR&BREATH IA: CPT

## 2024-04-27 PROCEDURE — 84145 PROCALCITONIN (PCT): CPT

## 2024-04-27 PROCEDURE — 85027 COMPLETE CBC AUTOMATED: CPT

## 2024-04-27 PROCEDURE — 36415 COLL VENOUS BLD VENIPUNCTURE: CPT

## 2024-04-27 PROCEDURE — 80179 DRUG ASSAY SALICYLATE: CPT

## 2024-04-27 PROCEDURE — 70450 CT HEAD/BRAIN W/O DYE: CPT

## 2024-04-27 PROCEDURE — 82948 REAGENT STRIP/BLOOD GLUCOSE: CPT

## 2024-04-27 PROCEDURE — 85730 THROMBOPLASTIN TIME PARTIAL: CPT

## 2024-04-27 PROCEDURE — 87077 CULTURE AEROBIC IDENTIFY: CPT

## 2024-04-27 PROCEDURE — 85007 BL SMEAR W/DIFF WBC COUNT: CPT

## 2024-04-27 PROCEDURE — 71260 CT THORAX DX C+: CPT

## 2024-04-27 PROCEDURE — 93005 ELECTROCARDIOGRAM TRACING: CPT

## 2024-04-27 PROCEDURE — 87040 BLOOD CULTURE FOR BACTERIA: CPT

## 2024-04-27 PROCEDURE — 87086 URINE CULTURE/COLONY COUNT: CPT | Performed by: NURSE PRACTITIONER

## 2024-04-27 PROCEDURE — 99285 EMERGENCY DEPT VISIT HI MDM: CPT | Performed by: EMERGENCY MEDICINE

## 2024-04-27 PROCEDURE — 80143 DRUG ASSAY ACETAMINOPHEN: CPT

## 2024-04-27 PROCEDURE — 83036 HEMOGLOBIN GLYCOSYLATED A1C: CPT | Performed by: NURSE PRACTITIONER

## 2024-04-27 RX ORDER — ACETAMINOPHEN 10 MG/ML
1000 INJECTION, SOLUTION INTRAVENOUS ONCE
Status: COMPLETED | OUTPATIENT
Start: 2024-04-27 | End: 2024-04-27

## 2024-04-27 RX ORDER — LATANOPROST 50 UG/ML
1 SOLUTION/ DROPS OPHTHALMIC
Status: DISCONTINUED | OUTPATIENT
Start: 2024-04-27 | End: 2024-05-03 | Stop reason: HOSPADM

## 2024-04-27 RX ORDER — TAMSULOSIN HYDROCHLORIDE 0.4 MG/1
0.4 CAPSULE ORAL DAILY
Status: DISCONTINUED | OUTPATIENT
Start: 2024-04-28 | End: 2024-05-03 | Stop reason: HOSPADM

## 2024-04-27 RX ORDER — PRAVASTATIN SODIUM 40 MG
40 TABLET ORAL
Status: DISCONTINUED | OUTPATIENT
Start: 2024-04-28 | End: 2024-05-03 | Stop reason: HOSPADM

## 2024-04-27 RX ORDER — INSULIN LISPRO 100 [IU]/ML
1-6 INJECTION, SOLUTION INTRAVENOUS; SUBCUTANEOUS
Status: DISCONTINUED | OUTPATIENT
Start: 2024-04-28 | End: 2024-05-01

## 2024-04-27 RX ORDER — ACETAMINOPHEN 325 MG/1
650 TABLET ORAL EVERY 6 HOURS PRN
Status: DISCONTINUED | OUTPATIENT
Start: 2024-04-27 | End: 2024-04-28

## 2024-04-27 RX ORDER — SODIUM CHLORIDE, SODIUM LACTATE, POTASSIUM CHLORIDE, CALCIUM CHLORIDE 600; 310; 30; 20 MG/100ML; MG/100ML; MG/100ML; MG/100ML
50 INJECTION, SOLUTION INTRAVENOUS CONTINUOUS
Status: DISPENSED | OUTPATIENT
Start: 2024-04-27 | End: 2024-04-28

## 2024-04-27 RX ORDER — HEPARIN SODIUM 5000 [USP'U]/ML
5000 INJECTION, SOLUTION INTRAVENOUS; SUBCUTANEOUS EVERY 8 HOURS SCHEDULED
Status: DISCONTINUED | OUTPATIENT
Start: 2024-04-27 | End: 2024-05-03 | Stop reason: HOSPADM

## 2024-04-27 RX ORDER — INSULIN LISPRO 100 [IU]/ML
1-5 INJECTION, SOLUTION INTRAVENOUS; SUBCUTANEOUS
Status: DISCONTINUED | OUTPATIENT
Start: 2024-04-27 | End: 2024-05-01

## 2024-04-27 RX ADMIN — IOHEXOL 100 ML: 350 INJECTION, SOLUTION INTRAVENOUS at 21:05

## 2024-04-27 RX ADMIN — ACETAMINOPHEN 1000 MG: 10 INJECTION INTRAVENOUS at 21:13

## 2024-04-27 RX ADMIN — CEFTRIAXONE SODIUM 2000 MG: 10 INJECTION, POWDER, FOR SOLUTION INTRAVENOUS at 19:53

## 2024-04-27 NOTE — ED PROVIDER NOTES
History  Chief Complaint   Patient presents with    Altered Mental Status     Arriving via EMS from home. 911 was called by a neighbor d/t patient yelling in his apartment. Pt confused per EMS and incontinent of urine.      88-year-old male with significant PMH including HTN, HLD, DM, and cardiac disease who presents to the ED via ambulance for changes in behavior.  As per EMS, patient was in his apartment screaming for help at which point a neighbor called 911.  The fire department had to forcibly enter patient's apartment when they found him sitting upright in no acute distress.  EMS states that he had a blood sugar about 350 and his temperature was 102 temporally.  Patient was also incontinent of urine while in route to the emergency department.  Patient at bedside is AAOx4 and answering questions appropriately however, does not follow commands properly.  No other acute concerns at this time as per patient but history is limited due to his status.        Prior to Admission Medications   Prescriptions Last Dose Informant Patient Reported? Taking?   aspirin (ECOTRIN LOW STRENGTH) 81 mg EC tablet   Yes No   Sig: Take 81 mg by mouth daily   insulin detemir (LEVEMIR FLEXTOUCH) 100 Units/mL injection pen   Yes No   Sig: Inject 25 Units under the skin daily   latanoprost (XALATAN) 0.005 % ophthalmic solution   Yes No   Sig: Administer 1 drop to both eyes daily at bedtime   meclizine (ANTIVERT) 12.5 MG tablet   Yes No   Sig: Take by mouth 3 (three) times a day as needed for dizziness   metFORMIN (GLUCOPHAGE) 850 mg tablet   No No   Sig: Take 1 tablet (850 mg total) by mouth 2 (two) times a day with meals   rosuvastatin (CRESTOR) 5 mg tablet   No No   Sig: Take 1 tablet (5 mg total) by mouth daily   tamsulosin (FLOMAX) 0.4 mg   No No   Sig: Take 1 capsule (0.4 mg total) by mouth daily      Facility-Administered Medications: None       Past Medical History:   Diagnosis Date    Cardiac disease     Diabetes mellitus (HCC)      Hyperlipidemia     Hypertension     Hyperthyroidism        Past Surgical History:   Procedure Laterality Date    CHOLECYSTECTOMY      HERNIA REPAIR         Family History   Problem Relation Age of Onset    Heart disease Family      I have reviewed and agree with the history as documented.    E-Cigarette/Vaping    E-Cigarette Use Never User      E-Cigarette/Vaping Substances    Nicotine No     THC No     CBD No     Flavoring No     Other No     Unknown No      Social History     Tobacco Use    Smoking status: Former     Types: Cigarettes    Smokeless tobacco: Never    Tobacco comments:     smoked since age 18 per Rayne   Vaping Use    Vaping status: Never Used   Substance Use Topics    Alcohol use: No    Drug use: No        Review of Systems   Unable to perform ROS: Mental status change       Physical Exam  ED Triage Vitals   Temperature Pulse Respirations Blood Pressure SpO2   04/27/24 1908 04/27/24 1859 04/27/24 1859 04/27/24 1903 04/27/24 1859   (!) 100.7 °F (38.2 °C) 83 16 142/86 97 %      Temp Source Heart Rate Source Patient Position - Orthostatic VS BP Location FiO2 (%)   04/27/24 1908 04/27/24 1859 04/27/24 2100 04/27/24 2100 --   Oral Monitor Lying Left arm       Pain Score       04/27/24 1859       No Pain             Orthostatic Vital Signs  Vitals:    04/27/24 2100 04/27/24 2115 04/27/24 2200 04/27/24 2245   BP: 165/74 163/73 134/75 (!) 175/80   Pulse: 77 88 103 82   Patient Position - Orthostatic VS: Lying          Physical Exam  Constitutional:       Appearance: Normal appearance. He is normal weight.   HENT:      Head: Normocephalic and atraumatic.      Right Ear: External ear normal.      Left Ear: External ear normal.      Nose: Nose normal.      Mouth/Throat:      Pharynx: Oropharynx is clear.   Eyes:      Extraocular Movements: Extraocular movements intact.      Conjunctiva/sclera: Conjunctivae normal.      Pupils: Pupils are equal, round, and reactive to light.   Cardiovascular:      Rate and  Rhythm: Normal rate and regular rhythm.      Pulses: Normal pulses.      Heart sounds: Normal heart sounds.      Comments: RRR with +S1 and S2, no murmurs appreciated on exam. Peripheral pulses intact.    Pulmonary:      Effort: Pulmonary effort is normal. No respiratory distress.      Breath sounds: Normal breath sounds. No wheezing, rhonchi or rales.      Comments: CTABL with no abnormal lung sounds such as wheezes or rales appreciated on exam.     Abdominal:      General: Abdomen is flat. Bowel sounds are normal. There is no distension.      Palpations: Abdomen is soft.      Tenderness: There is no abdominal tenderness.      Comments: Soft, non tender, normo-active bowel sounds. Without rigidity, guarding, or distension.     Musculoskeletal:         General: Normal range of motion.      Cervical back: Normal range of motion.   Skin:     General: Skin is warm and dry.   Neurological:      General: No focal deficit present.      Mental Status: He is alert and oriented to person, place, and time. Mental status is at baseline.      Cranial Nerves: No cranial nerve deficit.      Sensory: No sensory deficit.      Motor: No weakness.      Comments: Patient has difficulty following commands at bedside.  CN grossly intact on visualization. No focal neurologic deficits noted on exam. 5/5 strength in all extremities. Neurovascularly intact with normal sensation and motor function.           ED Medications  Medications   aspirin (ECOTRIN LOW STRENGTH) EC tablet 81 mg (has no administration in time range)   latanoprost (XALATAN) 0.005 % ophthalmic solution 1 drop (has no administration in time range)   pravastatin (PRAVACHOL) tablet 40 mg (has no administration in time range)   tamsulosin (FLOMAX) capsule 0.4 mg (has no administration in time range)   ceftriaxone (ROCEPHIN) 1 g/50 mL in dextrose IVPB (has no administration in time range)   lactated ringers infusion (has no administration in time range)   acetaminophen  (TYLENOL) tablet 650 mg (has no administration in time range)   heparin (porcine) subcutaneous injection 5,000 Units (has no administration in time range)   insulin lispro (HumALOG/ADMELOG) 100 units/mL subcutaneous injection 1-6 Units (has no administration in time range)   insulin lispro (HumALOG/ADMELOG) 100 units/mL subcutaneous injection 1-5 Units (has no administration in time range)   ceftriaxone (ROCEPHIN) 2 g/50 mL in dextrose IVPB (0 mg Intravenous Stopped 4/27/24 2025)   acetaminophen (Ofirmev) injection 1,000 mg (0 mg Intravenous Stopped 4/27/24 2130)   iohexol (OMNIPAQUE) 350 MG/ML injection (MULTI-DOSE) 100 mL (100 mL Intravenous Given 4/27/24 2105)       Diagnostic Studies  Results Reviewed       Procedure Component Value Units Date/Time    COVID/FLU/RSV [[id number]] Collected: 04/27/24 2317    Lab Status: In process Specimen: Nares from Nose Updated: 04/27/24 2320    Urine culture [[id number]]     Lab Status: No result Specimen: Urine     RBC Morphology Reflex Test [[id number]] Collected: 04/27/24 1922    Lab Status: Final result Specimen: Blood from Arm, Right Updated: 04/27/24 2101    Urine Microscopic [[id number]]  (Abnormal) Collected: 04/27/24 1953    Lab Status: Final result Specimen: Urine, Straight Cath Updated: 04/27/24 2037     RBC, UA 10-20 /hpf      WBC, UA 1-2 /hpf      Epithelial Cells Occasional /hpf      Bacteria, UA Occasional /hpf     Comprehensive metabolic panel [[id number]]  (Abnormal) Collected: 04/27/24 1922    Lab Status: Final result Specimen: Blood from Arm, Left Updated: 04/27/24 2026     Sodium 136 mmol/L      Potassium 4.5 mmol/L      Chloride 106 mmol/L      CO2 24 mmol/L      ANION GAP 6 mmol/L      BUN 25 mg/dL      Creatinine 1.11 mg/dL      Glucose 320 mg/dL      Calcium 9.2 mg/dL      AST 28 U/L      ALT 28 U/L      Alkaline Phosphatase 73 U/L      Total Protein 6.4 g/dL      Albumin 4.0 g/dL      Total Bilirubin 1.56 mg/dL      eGFR 59 ml/min/1.73sq m     Narrative:       [employer] guidelines for Chronic Kidney Disease (CKD):     Stage 1 with normal or high GFR (GFR > 90 mL/min/1.73 square meters)    Stage 2 Mild CKD (GFR = 60-89 mL/min/1.73 square meters)    Stage 3A Moderate CKD (GFR = 45-59 mL/min/1.73 square meters)    Stage 3B Moderate CKD (GFR = 30-44 mL/min/1.73 square meters)    Stage 4 Severe CKD (GFR = 15-29 mL/min/1.73 square meters)    Stage 5 End Stage CKD (GFR <15 mL/min/1.73 square meters)  Note: GFR calculation is accurate only with a steady state creatinine    Salicylate level [[id number]]  (Normal) Collected: 04/27/24 1922    Lab Status: Final result Specimen: Blood from Arm, Left Updated: 04/27/24 2026     Salicylate Lvl <5 mg/dL     Acetaminophen level-If concentration is detectable, please discuss with medical  on call. [[id number]]  (Abnormal) Collected: 04/27/24 1922    Lab Status: Final result Specimen: Blood from Arm, Left Updated: 04/27/24 2026     Acetaminophen Level <2 ug/mL     UA w Reflex to Microscopic w Reflex to Culture [[id number]]  (Abnormal) Collected: 04/27/24 1953    Lab Status: Final result Specimen: Urine, Straight Cath Updated: 04/27/24 2017     Color, UA Light Yellow     Clarity, UA Clear     Specific Gravity, UA 1.025     pH, UA 5.5     Leukocytes, UA Elevated glucose may cause decreased leukocyte values. See urine microscopic for UWBC result     Nitrite, UA Negative     Protein,  (2+) mg/dl      Glucose, UA >=1000 (1%) mg/dl      Ketones, UA 40 (2+) mg/dl      Urobilinogen, UA <2.0 mg/dl      Bilirubin, UA Negative     Occult Blood, UA Moderate    Procalcitonin [[id number]]  (Abnormal) Collected: 04/27/24 1922    Lab Status: Final result Specimen: Blood from Arm, Left Updated: 04/27/24 2007     Procalcitonin 1.05 ng/ml     CBC and differential [[id number]]  (Abnormal) Collected: 04/27/24 1922    Lab Status: Final result Specimen: Blood from Arm, Right Updated: 04/27/24 2002     WBC 16.68 Thousand/uL       RBC 5.01 Million/uL      Hemoglobin 15.9 g/dL      Hematocrit 45.9 %      MCV 92 fL      MCH 31.7 pg      MCHC 34.6 g/dL      RDW 13.2 %      MPV 9.6 fL      Platelets 196 Thousands/uL     Narrative:      This is an appended report.  These results have been appended to a previously verified report.    Manual Differential(PHLEBS Do Not Order) [[id number]]  (Abnormal) Collected: 04/27/24 1922    Lab Status: Final result Specimen: Blood from Arm, Right Updated: 04/27/24 2002     Segmented % 91 %      Bands % 1 %      Lymphocytes % 2 %      Monocytes % 6 %      Eosinophils % 0 %      Basophils % 0 %      Absolute Neutrophils 15.35 Thousand/uL      Absolute Lymphocytes 0.33 Thousand/uL      Absolute Monocytes 1.00 Thousand/uL      Absolute Eosinophils 0.00 Thousand/uL      Absolute Basophils 0.00 Thousand/uL      Total Counted --     Toxic Granulation Present     Toxic Vacuolated Neutrophils Present     RBC Morphology Normal     Platelet Estimate Adequate    Lactic acid [[id number]]  (Normal) Collected: 04/27/24 1922    Lab Status: Final result Specimen: Blood from Arm, Right Updated: 04/27/24 1956     LACTIC ACID 1.6 mmol/L     Narrative:      Result may be elevated if tourniquet was used during collection.    Ethanol [[id number]]  (Normal) Collected: 04/27/24 1922    Lab Status: Final result Specimen: Blood from Arm, Left Updated: 04/27/24 1954     Ethanol Lvl <10 mg/dL     Protime-INR [[id number]]  (Abnormal) Collected: 04/27/24 1922    Lab Status: Final result Specimen: Blood from Arm, Right Updated: 04/27/24 1953     Protime 14.7 seconds      INR 1.08    APTT [[id number]]  (Normal) Collected: 04/27/24 1922    Lab Status: Final result Specimen: Blood from Arm, Right Updated: 04/27/24 1953     PTT 31 seconds     Blood culture #2 [[id number]] Collected: 04/27/24 1922    Lab Status: In process Specimen: Blood from Arm, Right Updated: 04/27/24 1930    Blood culture #1 [[id number]] Collected: 04/27/24 1922    Lab Status: In  process Specimen: Blood from Arm, Left Updated: 04/27/24 1930    Fingerstick Glucose (POCT) [[id number]]  (Abnormal) Collected: 04/27/24 1908    Lab Status: Final result Specimen: Blood Updated: 04/27/24 1909     POC Glucose 307 mg/dl                    CT head without contrast   Final Result by Viridiana Rebollar MD (04/27 2209)      No acute intracranial abnormality.  Chronic microangiopathic changes.                  Workstation performed: [id number]         CT chest abdomen pelvis w contrast   Final Result by Viridiana Rebollar MD (04/27 2237)      No evidence of acute intrathoracic pathology.      Findings consistent with cystitis               Workstation performed: [id number]         XR chest 1 view portable   ED Interpretation by Timo Shen MD (04/27 1956)   Image was independently interpreted by myself and showed no acute concerns of cardiopulmonary disease at this time.              Procedures  ECG 12 Lead Documentation Only    Date/Time: 4/27/2024 7:09 PM    Performed by: Timo Shen MD  Authorized by: Timo Shen MD    ECG reviewed by me, the ED Provider: yes    Patient location:  ED  Previous ECG:     Previous ECG:  Compared to current    Similarity:  No change  Interpretation:     Interpretation: normal    Rate:     ECG rate:  89    ECG rate assessment: normal    Rhythm:     Rhythm: sinus rhythm    Ectopy:     Ectopy: PVCs    QRS:     QRS axis:  Normal    QRS intervals:  Normal  Conduction:     Conduction: normal    ST segments:     ST segments:  Normal  T waves:     T waves: normal          ED Course  ED Course as of 04/27/24 2341   Sat Apr 27, 2024 1916 POC Glucose(!): 307   1917 Temperature(!): 100.7 °F (38.2 °C)   1954 WBC(!): 16.68   1956 LACTIC ACID: 1.6   2005 Absolute Neutrophils(!): 15.35   2035 Procalcitonin(!): 1.05   2035 Blood, UA(!): Moderate   2036 Leukocytes, UA(!): Elevated glucose may cause decreased leukocyte values. See urine microscopic for UWBC result   2048 Bacteria, UA: Occasional    2101 Rectal temperature of 101 degrees F as per RN   2102 Temperature(!): 101.3 °F (38.5 °C)   2219 CT head without contrast  No acute intracranial abnormality.  Chronic microangiopathic changes. As per radiology   2240 CT chest abdomen pelvis w contrast  No evidence of acute intrathoracic pathology.     Findings consistent with cystitis  As per radiology                             SBIRT 20yo+      Flowsheet Row Most Recent Value   Initial Alcohol Screen: US AUDIT-C     1. How often do you have a drink containing alcohol? 0 Filed at: 04/27/2024 1858   2. How many drinks containing alcohol do you have on a typical day you are drinking?  0 Filed at: 04/27/2024 1858   3a. Male UNDER 65: How often do you have five or more drinks on one occasion? 0 Filed at: 04/27/2024 1858   3b. FEMALE Any Age, or MALE 65+: How often do you have 4 or more drinks on one occassion? 0 Filed at: 04/27/2024 1858   Audit-C Score 0 Filed at: 04/27/2024 1858   VERA: How many times in the past year have you...    Used an illegal drug or used a prescription medication for non-medical reasons? Never Filed at: 04/27/2024 1858                  Medical Decision Making  88-year-old male with significant PMH including HTN, HLD, DM, and cardiac disease who presented to the ED via ambulance for changes in behavior.  Patient's labs and imaging was obtained and reviewed by the ED provider.  See ED course for more details about patient's ED stay.  Patient's labs showed an elevated WBC of 16 along with an elevated procalcitonin as well as evidence concerning for UTI.  Patient's EKG showed normal sinus rhythm with PVCs and a rate of 89 bpm.  Patient's CT imaging of his head showed no acute intracranial abnormality but his CT chest abdomen and pelvis showed findings consistent with cystitis as per radiology.  Patient's one-view portable chest x-ray showed no acute concerns of cardiopulmonary disease at this time.  While in the emergency department, patient  was given 1 g of Ofirmev for fever control as well as 2 g of ceftriaxone for empiric antibiotic treatment.  Patient's case was discussed with the internal medicine team at which point he was accepted for inpatient admission under the service of Dr. Oconnell.    Amount and/or Complexity of Data Reviewed  Labs: ordered. Decision-making details documented in ED Course.  Radiology: ordered and independent interpretation performed. Decision-making details documented in ED Course.    Risk  Prescription drug management.  Decision regarding hospitalization.          Disposition  Final diagnoses:   Encephalopathy   Cystitis   Sepsis (HCC)     Time reflects when diagnosis was documented in both MDM as applicable and the Disposition within this note       Time User Action Codes Description Comment    4/27/2024 10:52 PM Maciej Bales Add [R40.4] Transient alteration of awareness     4/27/2024 10:52 PM Maciej Bales Remove [R40.4] Transient alteration of awareness     4/27/2024 10:52 PM Maciej Bales Add [G93.40] Encephalopathy     4/27/2024 10:52 PM Maciej Bales Add [N30.90] Cystitis     4/27/2024 10:52 PM Maciej Bales Add [A41.9] Sepsis (HCC)           ED Disposition       ED Disposition   Admit    Condition   Stable    Date/Time   Sat Apr 27, 2024 6818    Comment   Case was discussed with internal medicine and the patient's admission status was agreed to be Admission Status: inpatient status to the service of Dr. Oconnell .               Follow-up Information    None         Current Discharge Medication List        CONTINUE these medications which have NOT CHANGED    Details   aspirin (ECOTRIN LOW STRENGTH) 81 mg EC tablet Take 81 mg by mouth daily      insulin detemir (LEVEMIR FLEXTOUCH) 100 Units/mL injection pen Inject 25 Units under the skin daily      latanoprost (XALATAN) 0.005 % ophthalmic solution Administer 1 drop to both eyes daily at bedtime      meclizine (ANTIVERT) 12.5 MG tablet Take by mouth 3 (three) times a day as  needed for dizziness      metFORMIN (GLUCOPHAGE) 850 mg tablet Take 1 tablet (850 mg total) by mouth 2 (two) times a day with meals  Qty: 180 tablet, Refills: 1    Associated Diagnoses: Type 2 diabetes mellitus without complication, with long-term current use of insulin (HCC)      rosuvastatin (CRESTOR) 5 mg tablet Take 1 tablet (5 mg total) by mouth daily  Qty: 90 tablet, Refills: 1    Associated Diagnoses: Hyperlipidemia, unspecified hyperlipidemia type      tamsulosin (FLOMAX) 0.4 mg Take 1 capsule (0.4 mg total) by mouth daily  Qty: 90 capsule, Refills: 1    Associated Diagnoses: Benign prostatic hyperplasia without lower urinary tract symptoms           No discharge procedures on file.    PDMP Review       None             ED Provider  Attending physically available and evaluated Jeffery Keys. I managed the patient along with the ED Attending.    Electronically Signed by           Timo Shen MD  04/27/24 7829

## 2024-04-27 NOTE — LETTER
FAX      To:     Jose Luis Vicente   Company:  Phone:       Fax:        Email:        From:   Lashae Escalera  Phone:       Fax:    Email:      ________________________________________________    AVS attached. Transport arranged for 12:00pm. RN tried to call report, but unable to get through - will retry, or can be reached at: [phone].     Thanks!      NOTICE:  This communication, including attachments, may contain information that is confidential and protected by the -client or other privilege(s).

## 2024-04-28 LAB
ANION GAP SERPL CALCULATED.3IONS-SCNC: 9 MMOL/L (ref 4–13)
ATRIAL RATE: 89 BPM
BASOPHILS # BLD MANUAL: 0 THOUSAND/UL (ref 0–0.1)
BASOPHILS NFR MAR MANUAL: 0 % (ref 0–1)
BUN SERPL-MCNC: 28 MG/DL (ref 5–25)
CALCIUM SERPL-MCNC: 9.7 MG/DL (ref 8.4–10.2)
CHLORIDE SERPL-SCNC: 104 MMOL/L (ref 96–108)
CO2 SERPL-SCNC: 24 MMOL/L (ref 21–32)
CREAT SERPL-MCNC: 0.96 MG/DL (ref 0.6–1.3)
EOSINOPHIL # BLD MANUAL: 0 THOUSAND/UL (ref 0–0.4)
EOSINOPHIL NFR BLD MANUAL: 0 % (ref 0–6)
ERYTHROCYTE [DISTWIDTH] IN BLOOD BY AUTOMATED COUNT: 13.2 % (ref 11.6–15.1)
EST. AVERAGE GLUCOSE BLD GHB EST-MCNC: 169 MG/DL
FLUAV RNA RESP QL NAA+PROBE: NEGATIVE
FLUBV RNA RESP QL NAA+PROBE: NEGATIVE
GFR SERPL CREATININE-BSD FRML MDRD: 71 ML/MIN/1.73SQ M
GLUCOSE SERPL-MCNC: 245 MG/DL (ref 65–140)
GLUCOSE SERPL-MCNC: 251 MG/DL (ref 65–140)
GLUCOSE SERPL-MCNC: 268 MG/DL (ref 65–140)
GLUCOSE SERPL-MCNC: 299 MG/DL (ref 65–140)
GLUCOSE SERPL-MCNC: 318 MG/DL (ref 65–140)
GLUCOSE SERPL-MCNC: 376 MG/DL (ref 65–140)
HBA1C MFR BLD: 7.5 %
HCT VFR BLD AUTO: 45.1 % (ref 36.5–49.3)
HGB BLD-MCNC: 15.9 G/DL (ref 12–17)
LYMPHOCYTES # BLD AUTO: 0.72 THOUSAND/UL (ref 0.6–4.47)
LYMPHOCYTES # BLD AUTO: 4 % (ref 14–44)
LYMPHOCYTES NFR BLD AUTO: 4 % (ref 14–44)
MCH RBC QN AUTO: 31.9 PG (ref 26.8–34.3)
MCHC RBC AUTO-ENTMCNC: 35.3 G/DL (ref 31.4–37.4)
MCV RBC AUTO: 90 FL (ref 82–98)
MONOCYTES # BLD AUTO: 1.26 THOUSAND/UL (ref 0–1.22)
MONOCYTES NFR BLD AUTO: 7 % (ref 4–12)
MONOCYTES NFR BLD: 7 % (ref 4–12)
NEUTROPHILS # BLD MANUAL: 16.04 THOUSAND/UL (ref 1.85–7.62)
NEUTS SEG NFR BLD AUTO: 89 % (ref 43–75)
NEUTS SEG NFR BLD AUTO: 89 % (ref 43–75)
P AXIS: 68 DEGREES
PLATELET # BLD AUTO: 192 THOUSANDS/UL (ref 149–390)
PLATELET BLD QL SMEAR: ADEQUATE
PMV BLD AUTO: 9.9 FL (ref 8.9–12.7)
POTASSIUM SERPL-SCNC: 4.3 MMOL/L (ref 3.5–5.3)
PR INTERVAL: 196 MS
QRS AXIS: 62 DEGREES
QRSD INTERVAL: 84 MS
QT INTERVAL: 376 MS
QTC INTERVAL: 457 MS
RBC # BLD AUTO: 4.99 MILLION/UL (ref 3.88–5.62)
RBC MORPH BLD: NORMAL
RSV RNA RESP QL NAA+PROBE: NEGATIVE
SARS-COV-2 RNA RESP QL NAA+PROBE: NEGATIVE
SODIUM SERPL-SCNC: 137 MMOL/L (ref 135–147)
T WAVE AXIS: 68 DEGREES
TOTAL CELLS COUNTED SPEC: 100
TOXIC GRANULES BLD QL SMEAR: PRESENT
VENTRICULAR RATE: 89 BPM
WBC # BLD AUTO: 18.02 THOUSAND/UL (ref 4.31–10.16)

## 2024-04-28 PROCEDURE — 80048 BASIC METABOLIC PNL TOTAL CA: CPT | Performed by: NURSE PRACTITIONER

## 2024-04-28 PROCEDURE — 93010 ELECTROCARDIOGRAM REPORT: CPT | Performed by: INTERNAL MEDICINE

## 2024-04-28 PROCEDURE — 82948 REAGENT STRIP/BLOOD GLUCOSE: CPT

## 2024-04-28 PROCEDURE — 85007 BL SMEAR W/DIFF WBC COUNT: CPT | Performed by: NURSE PRACTITIONER

## 2024-04-28 PROCEDURE — 99233 SBSQ HOSP IP/OBS HIGH 50: CPT | Performed by: INTERNAL MEDICINE

## 2024-04-28 PROCEDURE — 97163 PT EVAL HIGH COMPLEX 45 MIN: CPT

## 2024-04-28 PROCEDURE — 85027 COMPLETE CBC AUTOMATED: CPT | Performed by: NURSE PRACTITIONER

## 2024-04-28 RX ORDER — INSULIN LISPRO 100 [IU]/ML
2 INJECTION, SOLUTION INTRAVENOUS; SUBCUTANEOUS
Status: DISCONTINUED | OUTPATIENT
Start: 2024-04-28 | End: 2024-04-29

## 2024-04-28 RX ORDER — ACETAMINOPHEN 325 MG/1
650 TABLET ORAL EVERY 6 HOURS PRN
Status: DISCONTINUED | OUTPATIENT
Start: 2024-04-28 | End: 2024-05-01

## 2024-04-28 RX ORDER — OLANZAPINE 2.5 MG/1
2.5 TABLET, FILM COATED ORAL ONCE AS NEEDED
Status: DISCONTINUED | OUTPATIENT
Start: 2024-04-28 | End: 2024-05-03 | Stop reason: HOSPADM

## 2024-04-28 RX ORDER — HYDRALAZINE HYDROCHLORIDE 20 MG/ML
10 INJECTION INTRAMUSCULAR; INTRAVENOUS EVERY 8 HOURS PRN
Status: DISCONTINUED | OUTPATIENT
Start: 2024-04-28 | End: 2024-05-03 | Stop reason: HOSPADM

## 2024-04-28 RX ADMIN — LATANOPROST 1 DROP: 50 SOLUTION OPHTHALMIC at 00:52

## 2024-04-28 RX ADMIN — LATANOPROST 1 DROP: 50 SOLUTION OPHTHALMIC at 21:25

## 2024-04-28 RX ADMIN — HEPARIN SODIUM 5000 UNITS: 5000 INJECTION INTRAVENOUS; SUBCUTANEOUS at 14:04

## 2024-04-28 RX ADMIN — SODIUM CHLORIDE, SODIUM LACTATE, POTASSIUM CHLORIDE, AND CALCIUM CHLORIDE 50 ML/HR: .6; .31; .03; .02 INJECTION, SOLUTION INTRAVENOUS at 17:35

## 2024-04-28 RX ADMIN — TAMSULOSIN HYDROCHLORIDE 0.4 MG: 0.4 CAPSULE ORAL at 08:29

## 2024-04-28 RX ADMIN — HEPARIN SODIUM 5000 UNITS: 5000 INJECTION INTRAVENOUS; SUBCUTANEOUS at 21:25

## 2024-04-28 RX ADMIN — SODIUM CHLORIDE, SODIUM LACTATE, POTASSIUM CHLORIDE, AND CALCIUM CHLORIDE 50 ML/HR: .6; .31; .03; .02 INJECTION, SOLUTION INTRAVENOUS at 00:52

## 2024-04-28 RX ADMIN — INSULIN LISPRO 4 UNITS: 100 INJECTION, SOLUTION INTRAVENOUS; SUBCUTANEOUS at 00:52

## 2024-04-28 RX ADMIN — CEFTRIAXONE SODIUM 1000 MG: 10 INJECTION, POWDER, FOR SOLUTION INTRAVENOUS at 19:22

## 2024-04-28 RX ADMIN — INSULIN LISPRO 2 UNITS: 100 INJECTION, SOLUTION INTRAVENOUS; SUBCUTANEOUS at 08:40

## 2024-04-28 RX ADMIN — ASPIRIN 81 MG: 81 TABLET, COATED ORAL at 08:29

## 2024-04-28 RX ADMIN — INSULIN LISPRO 3 UNITS: 100 INJECTION, SOLUTION INTRAVENOUS; SUBCUTANEOUS at 17:32

## 2024-04-28 RX ADMIN — INSULIN LISPRO 2 UNITS: 100 INJECTION, SOLUTION INTRAVENOUS; SUBCUTANEOUS at 11:54

## 2024-04-28 RX ADMIN — HYDRALAZINE HYDROCHLORIDE 10 MG: 20 INJECTION, SOLUTION INTRAMUSCULAR; INTRAVENOUS at 16:33

## 2024-04-28 RX ADMIN — HEPARIN SODIUM 5000 UNITS: 5000 INJECTION INTRAVENOUS; SUBCUTANEOUS at 00:53

## 2024-04-28 RX ADMIN — HEPARIN SODIUM 5000 UNITS: 5000 INJECTION INTRAVENOUS; SUBCUTANEOUS at 08:29

## 2024-04-28 RX ADMIN — ACETAMINOPHEN 650 MG: 325 TABLET, FILM COATED ORAL at 04:07

## 2024-04-28 RX ADMIN — INSULIN LISPRO 5 UNITS: 100 INJECTION, SOLUTION INTRAVENOUS; SUBCUTANEOUS at 11:54

## 2024-04-28 RX ADMIN — INSULIN DETEMIR 15 UNITS: 100 INJECTION, SOLUTION SUBCUTANEOUS at 00:52

## 2024-04-28 RX ADMIN — PRAVASTATIN SODIUM 40 MG: 40 TABLET ORAL at 16:33

## 2024-04-28 RX ADMIN — INSULIN LISPRO 3 UNITS: 100 INJECTION, SOLUTION INTRAVENOUS; SUBCUTANEOUS at 08:40

## 2024-04-28 RX ADMIN — INSULIN DETEMIR 15 UNITS: 100 INJECTION, SOLUTION SUBCUTANEOUS at 22:17

## 2024-04-28 RX ADMIN — INSULIN LISPRO 2 UNITS: 100 INJECTION, SOLUTION INTRAVENOUS; SUBCUTANEOUS at 21:25

## 2024-04-28 RX ADMIN — INSULIN LISPRO 2 UNITS: 100 INJECTION, SOLUTION INTRAVENOUS; SUBCUTANEOUS at 17:32

## 2024-04-28 NOTE — PHYSICAL THERAPY NOTE
PHYSICAL THERAPY EVALUATION  NAME: Jeffery Keys  AGE:   88 y.o.  MRN:  [id number]  ADMIT DX: Cystitis [N30.90]  Encephalopathy [G93.40]  Sepsis (HCC) [A41.9]    PMH:   Past Medical History:   Diagnosis Date    Cardiac disease     Diabetes mellitus (HCC)     Hyperlipidemia     Hypertension     Hyperthyroidism      LENGTH OF STAY: 1        24 1006   PT Last Visit   PT Visit Date 24   Note Type   Note type Evaluation   Pain Assessment   Pain Assessment Tool 0-10   Pain Score No Pain   Restrictions/Precautions   Weight Bearing Precautions Per Order No   Other Precautions Impulsive;Cognitive;Chair Alarm;Bed Alarm;Multiple lines;Fall Risk   Home Living   Type of Home Apartment   Home Layout One level;Elevator  (3rd floor apartment with elevator access)   Bathroom Equipment Tub transfer bench;Commode   Home Equipment Cane;Walker;Wheelchair-manual   Additional Comments Pt ambulates with mod I and use of SPC.   Prior Function   Level of [place] Independent with ADLs;Independent with functional mobility;Needs assistance with IADLS  (pt reports he is a (+)  and performs all IADLs with the exception of cooking, which his friend Cindy (from 4th floor) provides meals)   Lives With (S)  Alone   Receives Help From Family   IADLs Independent with driving;Independent with medication management;Family/Friend/Other provides meals   Comments Pt is a poor historian, most information obtained per chart.   General   Family/Caregiver Present No   Cognition   Overall Cognitive Status Impaired   Arousal/Participation Cooperative   Orientation Level Oriented to person;Disoriented to place;Disoriented to time;Disoriented to situation   Memory Decreased recall of precautions;Decreased recall of recent events;Decreased short term memory   Following Commands Follows one step commands inconsistently   Comments Pt identified by name and . Highly distractible and requires max cueing for redirection.   Subjective  "  Subjective Agrees to PT evaluation and is pleasantly confused throughout session.  \"We need to get you on the other side of this wire.\"  (Pt attempting to place IV over his head.)   RLE Assessment   RLE Assessment X   Strength RLE   RLE Overall Strength 4-/5  (functionally)   LLE Assessment   LLE Assessment X   Strength LLE   LLE Overall Strength 4-/5  (functionally)   Bed Mobility   Supine to Sit 3  Moderate assistance   Additional items Assist x 1;HOB elevated;Bedrails;Increased time required;Verbal cues;LE management   Sit to Supine Unable to assess  (left OOB in chair post session with alarm intact)   Transfers   Sit to Stand 4  Minimal assistance   Additional items Assist x 1;Increased time required;Verbal cues   Stand to Sit 3  Moderate assistance   Additional items Assist x 1;Increased time required;Verbal cues  (requires assist for initiation of task)   Stand pivot 4  Minimal assistance   Additional items Assist x 1;Increased time required;Verbal cues  (steppage transfer with RW, however requires max cueing for redirection, management of RW and requires chair to be brought behind pt as pt is unable to follow commands/tasks)   Additional Comments highly distracted by IV line, masimo, etc.   Balance   Static Sitting Fair   Dynamic Sitting Fair -   Static Standing Fair -   Dynamic Standing Fair -   Ambulatory Poor +   Activity Tolerance   Activity Tolerance Other (Comment)  (difficulty to redirect)   Nurse Made Aware Per RN, pt appropriate to evaluate   Assessment   Prognosis Fair   Problem List Decreased strength;Impaired balance;Decreased mobility;Decreased cognition;Impaired judgement;Decreased safety awareness   Assessment Pt is a 88 y.o. male seen for PT evaluation s/p admit to [facility] on 8/18/2022 w/ UTI (urinary tract infection).  Order placed for PT. Comorbidities affecting pt's physical performance at time of assessment include: DM and HTN. Personal factors affecting pt at time of IE " include: limited home support, advanced age, inability to perform IADLs, inability to perform ADLs, inability to ambulate household distances, and limited insight into impairments. Prior to admission, pt was independent w/ all functional mobility w/ SPC, lived in one floor environment, and has elevator access . Upon evaluation: Pt requires mod A for bed mobility, min A for sit to stand, and min A for SPT with RW and significant cueing for redirection to task.   (Please find full objective findings from PT assessment regarding body systems outlined above). Impairments and limitations also listed above, especially due to  weakness, impaired balance, decreased activity tolerance, decreased safety awareness, impaired judgement, fall risk, and decreased cognition.  Pt's clinical presentation is currently unstable/unpredictable seen in pt's presentation of fall risk, poor insight into deficits, and significant decline in functional mobility compared to baseline.  Pt to benefit from continued skilled PT tx while in hospital and upon DC to address deficits as defined above and maximize level of functional mobility.  Recommend  progression of ambulation and initiation of HEP as appropriate .   May benefit from limiting number of lines prior to mobility and 2nd assist for redirection to task.   Goals   Patient Goals unable to state at this time due to mental status   STG Expiration Date 05/08/24   Short Term Goal #1 Pt will be able to: (1) perform bed mobility with supervision to promote OOB activity (2) perform sit to stand with supervision to decrease burden of care (3) ambulate at least 200` with supervision and least restrictive AD to increase activity tolerance (4) increase standing balance by 1 grade to decrease risk of falls   PT Treatment Day 0   Plan   Treatment/Interventions Functional transfer training;LE strengthening/ROM;Therapeutic exercise;Endurance training;Cognitive reorientation;Patient/family training;Bed  mobility;Equipment eval/education;Gait training   PT Frequency 3-5x/wk   Discharge Recommendation   Rehab Resource Intensity Level, PT II (Moderate Resource Intensity)   Equipment Recommended   (pending trials SPC vs RW)   [facility] Basic Mobility Inpatient   Turning in Flat Bed Without Bedrails 3   Lying on Back to Sitting on Edge of Flat Bed Without Bedrails 2   Moving Bed to Chair 2   Standing Up From Chair Using Arms 3   Walk in Room 2   Climb 3-5 Stairs With Railing 1   Basic Mobility Inpatient Raw Score 13   Basic Mobility Standardized Score 33.99   [facility] Level Of Mobility   -[facility] Goal 4: Move to chair/commode   -[facility] Achieved 5: Stand (1 or more minutes)   End of Consult   Patient Position at End of Consult Bedside chair;Bed/Chair alarm activated;All needs within reach     The patient's [facility] Basic Mobility Inpatient Short Form Raw Score is 13, Standardized Score is 33.99.  A Raw Score of less than 16 suggests the patient may benefit from discharge to post-acute rehabilitation services. However please refer to therapist recommendation for discharge planning given other factors that may influence destination.     Adapted from Christopher CEDILLO, Juve J, Padmini J, Kevin DOWNING. Association of -PAC “6-Clicks” Basic Mobility and Daily Activity Scores With Discharge Destination. Physical Therapy, 2021;101:1-9. DOI: [id number]      Bessy Alicea PT,DPT

## 2024-04-28 NOTE — ASSESSMENT & PLAN NOTE
Fever, tachycardia, leukocytosis.   Suspected source: cystitis noted on imaging.    Send urine culture  Check flu/rsv/covid  Follow up blood cultures

## 2024-04-28 NOTE — PROGRESS NOTES
[facility]  Progress Note  Name: Jeffery Keys I  MRN: [id number]  Unit/Bed#: S -01 I Date of Admission: 4/27/2024   Date of Service: 4/28/2024 I Hospital Day: 1    Assessment/Plan   * UTI (urinary tract infection)  Assessment & Plan  CT CAP: diffuse bladder wall thickening, pericystic inflammatory change.  UA: 1-2 WBC, occasional bacteria  Urine culture pending  Continue ceftriaxone    Acute metabolic encephalopathy  Assessment & Plan  EMS called by neighbor due to AMS.  Is AOx4 at baseline.  Currently alert to zero but follows simple commands.  Nonfocal neuro exam.  Able to answer simple yes and no questions.  Improving. Suspect in setting of acute infection.   CTH: no acute findings  Coma panel negative  Monitor mental status, supportive cares as above  Delirium precuations    HTN (hypertension), benign  Assessment & Plan  BP elevated  Is not on medication   Monitor for now given acute infection  Prn hydralazine for SBP >180    DM2 (diabetes mellitus, type 2) ([facility])  Assessment & Plan  Lab Results   Component Value Date    HGBA1C 7.5 (H) 04/27/2024   With hyperglycemia likely 2/2 infection  Continue home levelmir 25 units per op records, however, unable to confirm with patient.  Start humalog 2u tid with meals  Resume metformin on discharge  Accucheck, SSI    Sepsis ([facility] located [facility])  Assessment & Plan  Fever, tachycardia, leukocytosis.   Suspected source: cystitis noted on imaging.    F/u urine cx  Follow up blood cultures  Trend fever curve and WBC ct         VTE Pharmacologic Prophylaxis: VTE Score: 5 High Risk (Score >/= 5) - Pharmacological DVT Prophylaxis Ordered: heparin. Sequential Compression Devices Ordered.    Mobility:      JH-HLM Goal NOT achieved. Continue with multidisciplinary rounding and encourage appropriate mobility to improve upon JH-HLM goals.    Patient Centered Rounds: I performed bedside rounds with nursing staff today.   Discussions with Specialists or Other Care  Team Provider: None    Education and Discussions with Family / Patient: Updated  (friend Cindy) via phone.    Total Time Spent on Date of Encounter in care of patient: 30 mins. This time was spent on one or more of the following: performing physical exam; counseling and coordination of care; obtaining or reviewing history; documenting in the medical record; reviewing/ordering tests, medications or procedures; communicating with other healthcare professionals and discussing with patient's family/caregivers.    Current Length of Stay: 1 day(s)  Current Patient Status: Inpatient   Certification Statement: The patient will continue to require additional inpatient hospital stay due to Iv abx  Discharge Plan: Anticipate discharge in 48-72 hrs to Pending clinical improvement and PT/OT evals    Code Status: Level 1 - Full Code    Subjective:   No acute events overnight.  Patient is still disoriented and lethargic though able to answer simple yes or no questions.  Tmax 101.8    Objective:     Vitals:   Temp (24hrs), Av.1 °F (37.8 °C), Min:98.8 °F (37.1 °C), Max:101.8 °F (38.8 °C)    Temp:  [98.8 °F (37.1 °C)-101.8 °F (38.8 °C)] 99.5 °F (37.5 °C)  HR:  [] 68  Resp:  [16-20] 20  BP: (134-190)/(73-87) 169/73  SpO2:  [90 %-97 %] 90 %  Body mass index is 27.19 kg/m².     Input and Output Summary (last 24 hours):     Intake/Output Summary (Last 24 hours) at 2024 0733  Last data filed at 2024 0052  Gross per 24 hour   Intake 2050 ml   Output 500 ml   Net 1550 ml       Physical Exam:   Physical Exam  Constitutional:       General: He is not in acute distress.     Appearance: He is ill-appearing and toxic-appearing.   Cardiovascular:      Rate and Rhythm: Normal rate and regular rhythm.   Pulmonary:      Effort: No respiratory distress.      Breath sounds: No wheezing, rhonchi or rales.   Abdominal:      General: There is no distension.      Palpations: Abdomen is soft.      Tenderness: There is no  abdominal tenderness. There is no guarding.   Musculoskeletal:         General: No swelling.   Skin:     General: Skin is warm and dry.   Neurological:      Mental Status: He is disoriented.          Additional Data:     Labs:  Results from last 7 days   Lab Units 04/28/24  0545 04/27/24 1922   WBC Thousand/uL 18.02* 16.68*   HEMOGLOBIN g/dL 15.9 15.9   HEMATOCRIT % 45.1 45.9   PLATELETS Thousands/uL 192 196   BANDS PCT %  --  1   LYMPHO PCT %  --  2*   MONO PCT %  --  6   EOS PCT %  --  0     Results from last 7 days   Lab Units 04/28/24  0545 04/27/24  1922   SODIUM mmol/L 137 136   POTASSIUM mmol/L 4.3 4.5   CHLORIDE mmol/L 104 106   CO2 mmol/L 24 24   BUN mg/dL 28* 25   CREATININE mg/dL 0.96 1.11   ANION GAP mmol/L 9 6   CALCIUM mg/dL 9.7 9.2   ALBUMIN g/dL  --  4.0   TOTAL BILIRUBIN mg/dL  --  1.56*   ALK PHOS U/L  --  73   ALT U/L  --  28   AST U/L  --  28   GLUCOSE RANDOM mg/dL 299* 320*     Results from last 7 days   Lab Units 04/27/24  1922   INR  1.08     Results from last 7 days   Lab Units 04/28/24  0709 04/28/24  0028 04/27/24  1908   POC GLUCOSE mg/dl 268* 376* 307*     Results from last 7 days   Lab Units 04/27/24  1922   HEMOGLOBIN A1C % 7.5*     Results from last 7 days   Lab Units 04/27/24  1922   LACTIC ACID mmol/L 1.6   PROCALCITONIN ng/ml 1.05*       Lines/Drains:  Invasive Devices       Peripheral Intravenous Line  Duration             Peripheral IV 04/27/24 Left;Ventral (anterior) Forearm <1 day    Peripheral IV 04/27/24 Right Antecubital <1 day              Drain  Duration             External Urinary Catheter Medium <1 day                          Imaging: No pertinent imaging reviewed.    Recent Cultures (last 7 days):   Results from last 7 days   Lab Units 04/27/24 1922   BLOOD CULTURE  Received in Microbiology Lab. Culture in Progress.  Received in Microbiology Lab. Culture in Progress.       Last 24 Hours Medication List:   Current Facility-Administered Medications   Medication Dose  Route Frequency Provider Last Rate    acetaminophen  650 mg Oral Q6H PRN Luz Thayer PA-C      aspirin  81 mg Oral Daily DELILAH [place]      cefTRIAXone  1,000 mg Intravenous Q24H DELILAH [place]      heparin (porcine)  5,000 Units Subcutaneous Q8H [facility] DELILAH [place]      hydrALAZINE  10 mg Intravenous Q8H PRN Genaro Tian MD      insulin detemir  15 Units Subcutaneous HS DELILAH [place]      insulin lispro  1-5 Units Subcutaneous HS DELILAH [place]      insulin lispro  1-6 Units Subcutaneous TID AC DELILAH [place]      insulin lispro  2 Units Subcutaneous TID With Meals Genaro Tian MD      lactated ringers  50 mL/hr Intravenous Continuous Genaro Tian MD 50 mL/hr (04/28/24 0052)    latanoprost  1 drop Both Eyes HS DELILAH [place]      pravastatin  40 mg Oral Daily With Dinner DELILAH [place]      tamsulosin  0.4 mg Oral Daily DELILAH [place]          Today, Patient Was Seen By: Genaro Tian MD    **Please Note: This note may have been constructed using a voice recognition system.**

## 2024-04-28 NOTE — PLAN OF CARE
Problem: PHYSICAL THERAPY ADULT  Goal: Performs mobility at highest level of function for planned discharge setting.  See evaluation for individualized goals.  Description: Treatment/Interventions: Functional transfer training, LE strengthening/ROM, Therapeutic exercise, Endurance training, Cognitive reorientation, Patient/family training, Bed mobility, Equipment eval/education, Gait training  Equipment Recommended:  (pending trials SPC vs RW)       See flowsheet documentation for full assessment, interventions and recommendations.  Note: Prognosis: Fair  Problem List: Decreased strength, Impaired balance, Decreased mobility, Decreased cognition, Impaired judgement, Decreased safety awareness  Assessment: Pt is a 88 y.o. male seen for PT evaluation s/p admit to [facility] on 8/18/2022 w/ UTI (urinary tract infection).  Order placed for PT. Comorbidities affecting pt's physical performance at time of assessment include: DM and HTN. Personal factors affecting pt at time of IE include: limited home support, advanced age, inability to perform IADLs, inability to perform ADLs, inability to ambulate household distances, and limited insight into impairments. Prior to admission, pt was independent w/ all functional mobility w/ SPC, lived in one floor environment, and has elevator access . Upon evaluation: Pt requires mod A for bed mobility, min A for sit to stand, and min A for SPT with RW and significant cueing for redirection to task.   (Please find full objective findings from PT assessment regarding body systems outlined above). Impairments and limitations also listed above, especially due to  weakness, impaired balance, decreased activity tolerance, decreased safety awareness, impaired judgement, fall risk, and decreased cognition.  Pt's clinical presentation is currently unstable/unpredictable seen in pt's presentation of fall risk, poor insight into deficits, and significant decline in functional mobility  compared to baseline.  Pt to benefit from continued skilled PT tx while in hospital and upon DC to address deficits as defined above and maximize level of functional mobility.  Recommend  progression of ambulation and initiation of HEP as appropriate .   May benefit from limiting number of lines prior to mobility and 2nd assist for redirection to task.        Rehab Resource Intensity Level, PT: II (Moderate Resource Intensity)    See flowsheet documentation for full assessment.

## 2024-04-28 NOTE — ASSESSMENT & PLAN NOTE
EMS called by neighbor due to AMS.  Is AOx4 at baseline.  Currently alert to zero but follows simple commands.  Nonfocal neuro exam.  Able to answer simple yes and no questions.  Improving. Suspect in setting of acute infection.   CTH: no acute findings  Coma panel negative  Monitor mental status, supportive cares as above  Delirium precuations
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
## 2024-04-28 NOTE — ASSESSMENT & PLAN NOTE
EMS called by neighbor due to AMS.  Is AOx4 at baseline.  Currently alert to zero but follows simple commands.  Nonfocal neuro exam.  CTH: no acute findings  Coma panel negative  Suspect in setting of acute infection  Monitor mental status

## 2024-04-28 NOTE — ASSESSMENT & PLAN NOTE
CT CAP: diffuse bladder wall thickening, pericystic inflammatory change.  UA: 1-2 WBC, occasional bacteria  Urine culture pending  Continue ceftriaxone

## 2024-04-28 NOTE — NURSING NOTE
Patient is biting at fingers, putting entire finger in his mouth.  Unable to redirect patient.  Soft mits applied to patient.  ELLEN Vee is trying to feed patient and patient continues to try and put fingers in his mouth and bite off mits.

## 2024-04-28 NOTE — ED ATTENDING ATTESTATION
4/27/2024  IMaciej DO, saw and evaluated the patient. I have discussed the patient with the resident/non-physician practitioner and agree with the resident's/non-physician practitioner's findings, Plan of Care, and MDM as documented in the resident's/non-physician practitioner's note, except where noted. All available labs and Radiology studies were reviewed.  I was present for key portions of any procedure(s) performed by the resident/non-physician practitioner and I was immediately available to provide assistance.       At this point I agree with the current assessment done in the Emergency Department.  I have conducted an independent evaluation of this patient a history and physical is as follows: 88-year-old male, past medical history per resident note, presenting to the emergency department secondary to a well check as neighbors felt he was acting bizarrely.  Patient without any complaints at this time.  Attempted to contact next of kin but only only a friend was available that is not his POA.  HPI and ROS limited.    Febrile, vital signs otherwise stable.  Patient resting comfortably.  Alert and oriented time person and time however requires multiple prompts to complete exam.  Vision grossly intact.  Cranial nerves intact.  5 out of 5 strength bilateral upper extremities.  Lungs clear to auscultation.  No increased work of breathing.  Heart regular rhythm without murmur gallop or rub.  Abdomen soft nontender.  Bilateral lower extremities without wounds.  Bilateral lower extremities with 5-5 strength.  Unable to perform evaluation for ataxia secondary to patient compliance.     88-year-old male presenting to the emergency department with encephalopathy likely secondary to urinary tract infection/cystitis.  CBC with leukocytosis.  CMP at baseline.  CT scan performed as urinary results were initially equivocal in order to further localize source.  Antibiotics provided.  No signs of severe sepsis.  Call  placed to hospital medicine who accepted their care.    ED Course  ED Course as of 04/28/24 0008   Sat Apr 27, 2024   1908 Care accepted. Awaiting CMP.    1938 Sepsis identified, source likely urinary. Will tx for same. Provided 700ml prehospital. No cardiac function on file. Will await signs/sx end organ damage to avoid fluid overload in at risk population     2058 Spoke with Cindy Carranza, pt's friend, who notes no current POA. No family members locally nor non that he keeps in contact with. Will CT Chest AP for source. CTH also ordered. Should scan not show obvious cystitis or other infectious etiology, will attempt LP.          Critical Care Time  Procedures

## 2024-04-28 NOTE — ASSESSMENT & PLAN NOTE
BP elevated  Is not on medication   Monitor for now given acute infection  Prn hydralazine for SBP >180

## 2024-04-28 NOTE — NURSING NOTE
"Patient is staring at ceiling, hallucinating stating \"they are going to murder me.\"  Attempted to redirect patient.  Patient states \"they are going to murder you too\".  Stayed with patient reassuring him he is safe and in the hospital.    "

## 2024-04-28 NOTE — ASSESSMENT & PLAN NOTE
Lab Results   Component Value Date    HGBA1C 7.0 (H) 01/30/2024     Update A1c  Continue home levelmir 25 units per op records, however, unable to confirm with patient.  RN to call pharmacy in AM.  Will give 15 units levemir tonight  Resume metformin on discharge  Accucheck, SSI

## 2024-04-28 NOTE — ED NOTES
Patients friend, Cindy, who will be his ride, was added to his chart with approval of patient.       Fe Daily RN  04/27/24 8517

## 2024-04-28 NOTE — ASSESSMENT & PLAN NOTE
Lab Results   Component Value Date    HGBA1C 7.5 (H) 04/27/2024   With hyperglycemia likely 2/2 infection  Continue home levelmir 25 units per op records, however, unable to confirm with patient.  Start humalog 2u tid with meals  Resume metformin on discharge  Accucheck, SSI

## 2024-04-28 NOTE — ASSESSMENT & PLAN NOTE
Fever, tachycardia, leukocytosis.   Suspected source: cystitis noted on imaging.    F/u urine cx  Follow up blood cultures  Trend fever curve and WBC ct

## 2024-04-28 NOTE — PLAN OF CARE
Problem: Potential for Falls  Goal: Patient will remain free of falls  Description: INTERVENTIONS:  - Educate patient/family on patient safety including physical limitations  - Instruct patient to call for assistance with activity   - Consult OT/PT to assist with strengthening/mobility   - Keep Call bell within reach  - Keep bed low and locked with side rails adjusted as appropriate  - Keep care items and personal belongings within reach  - Initiate and maintain comfort rounds  - Make Fall Risk Sign visible to staff  - Offer Toileting every  Hours, in advance of need  - Initiate/Maintain alarm  - Obtain necessary fall risk management equipment:   - Apply yellow socks and bracelet for high fall risk patients  - Consider moving patient to room near nurses station  4/28/2024 1931 by Debra Gipson, RN  Outcome: Progressing  4/28/2024 1742 by Debra Gipson, RN  Outcome: Progressing

## 2024-04-29 ENCOUNTER — APPOINTMENT (INPATIENT)
Dept: CT IMAGING | Facility: HOSPITAL | Age: 89
DRG: 871 | End: 2024-04-29
Payer: MEDICARE

## 2024-04-29 PROBLEM — M79.9: Status: ACTIVE | Noted: 2024-04-29

## 2024-04-29 PROBLEM — R78.81 BACTEREMIA: Status: ACTIVE | Noted: 2024-04-29

## 2024-04-29 LAB
ANION GAP SERPL CALCULATED.3IONS-SCNC: 9 MMOL/L (ref 4–13)
BACTERIA UR CULT: NORMAL
BASOPHILS # BLD AUTO: 0.04 THOUSANDS/ÂΜL (ref 0–0.1)
BASOPHILS NFR BLD AUTO: 0 % (ref 0–1)
BUN SERPL-MCNC: 26 MG/DL (ref 5–25)
CALCIUM SERPL-MCNC: 9 MG/DL (ref 8.4–10.2)
CHLORIDE SERPL-SCNC: 104 MMOL/L (ref 96–108)
CO2 SERPL-SCNC: 26 MMOL/L (ref 21–32)
CREAT SERPL-MCNC: 0.83 MG/DL (ref 0.6–1.3)
EOSINOPHIL # BLD AUTO: 0.03 THOUSAND/ÂΜL (ref 0–0.61)
EOSINOPHIL NFR BLD AUTO: 0 % (ref 0–6)
ERYTHROCYTE [DISTWIDTH] IN BLOOD BY AUTOMATED COUNT: 13 % (ref 11.6–15.1)
GFR SERPL CREATININE-BSD FRML MDRD: 79 ML/MIN/1.73SQ M
GLUCOSE SERPL-MCNC: 226 MG/DL (ref 65–140)
GLUCOSE SERPL-MCNC: 237 MG/DL (ref 65–140)
GLUCOSE SERPL-MCNC: 242 MG/DL (ref 65–140)
GLUCOSE SERPL-MCNC: 255 MG/DL (ref 65–140)
GLUCOSE SERPL-MCNC: 306 MG/DL (ref 65–140)
HCT VFR BLD AUTO: 46.5 % (ref 36.5–49.3)
HGB BLD-MCNC: 16.5 G/DL (ref 12–17)
IMM GRANULOCYTES # BLD AUTO: 0.07 THOUSAND/UL (ref 0–0.2)
IMM GRANULOCYTES NFR BLD AUTO: 0 % (ref 0–2)
LYMPHOCYTES # BLD AUTO: 0.73 THOUSANDS/ÂΜL (ref 0.6–4.47)
LYMPHOCYTES NFR BLD AUTO: 4 % (ref 14–44)
MCH RBC QN AUTO: 31.9 PG (ref 26.8–34.3)
MCHC RBC AUTO-ENTMCNC: 35.5 G/DL (ref 31.4–37.4)
MCV RBC AUTO: 90 FL (ref 82–98)
MONOCYTES # BLD AUTO: 1.31 THOUSAND/ÂΜL (ref 0.17–1.22)
MONOCYTES NFR BLD AUTO: 7 % (ref 4–12)
NEUTROPHILS # BLD AUTO: 16.66 THOUSANDS/ÂΜL (ref 1.85–7.62)
NEUTS SEG NFR BLD AUTO: 89 % (ref 43–75)
NRBC BLD AUTO-RTO: 0 /100 WBCS
PLATELET # BLD AUTO: 189 THOUSANDS/UL (ref 149–390)
PMV BLD AUTO: 10.1 FL (ref 8.9–12.7)
POTASSIUM SERPL-SCNC: 4.2 MMOL/L (ref 3.5–5.3)
PROCALCITONIN SERPL-MCNC: 0.76 NG/ML
RBC # BLD AUTO: 5.17 MILLION/UL (ref 3.88–5.62)
SODIUM SERPL-SCNC: 139 MMOL/L (ref 135–147)
WBC # BLD AUTO: 18.84 THOUSAND/UL (ref 4.31–10.16)

## 2024-04-29 PROCEDURE — 84145 PROCALCITONIN (PCT): CPT | Performed by: PHARMACIST

## 2024-04-29 PROCEDURE — 70491 CT SOFT TISSUE NECK W/DYE: CPT

## 2024-04-29 PROCEDURE — 97535 SELF CARE MNGMENT TRAINING: CPT

## 2024-04-29 PROCEDURE — 97530 THERAPEUTIC ACTIVITIES: CPT

## 2024-04-29 PROCEDURE — 80048 BASIC METABOLIC PNL TOTAL CA: CPT | Performed by: INTERNAL MEDICINE

## 2024-04-29 PROCEDURE — 82948 REAGENT STRIP/BLOOD GLUCOSE: CPT

## 2024-04-29 PROCEDURE — 85025 COMPLETE CBC W/AUTO DIFF WBC: CPT | Performed by: INTERNAL MEDICINE

## 2024-04-29 PROCEDURE — 97167 OT EVAL HIGH COMPLEX 60 MIN: CPT

## 2024-04-29 PROCEDURE — 99232 SBSQ HOSP IP/OBS MODERATE 35: CPT | Performed by: INTERNAL MEDICINE

## 2024-04-29 RX ORDER — INSULIN LISPRO 100 [IU]/ML
3 INJECTION, SOLUTION INTRAVENOUS; SUBCUTANEOUS
Status: DISCONTINUED | OUTPATIENT
Start: 2024-04-29 | End: 2024-05-01

## 2024-04-29 RX ORDER — SODIUM CHLORIDE 9 MG/ML
75 INJECTION, SOLUTION INTRAVENOUS CONTINUOUS
Status: DISCONTINUED | OUTPATIENT
Start: 2024-04-29 | End: 2024-04-29

## 2024-04-29 RX ADMIN — INSULIN LISPRO 3 UNITS: 100 INJECTION, SOLUTION INTRAVENOUS; SUBCUTANEOUS at 17:54

## 2024-04-29 RX ADMIN — INSULIN LISPRO 2 UNITS: 100 INJECTION, SOLUTION INTRAVENOUS; SUBCUTANEOUS at 08:56

## 2024-04-29 RX ADMIN — INSULIN LISPRO 3 UNITS: 100 INJECTION, SOLUTION INTRAVENOUS; SUBCUTANEOUS at 12:33

## 2024-04-29 RX ADMIN — HEPARIN SODIUM 5000 UNITS: 5000 INJECTION INTRAVENOUS; SUBCUTANEOUS at 05:10

## 2024-04-29 RX ADMIN — HEPARIN SODIUM 5000 UNITS: 5000 INJECTION INTRAVENOUS; SUBCUTANEOUS at 14:40

## 2024-04-29 RX ADMIN — INSULIN LISPRO 2 UNITS: 100 INJECTION, SOLUTION INTRAVENOUS; SUBCUTANEOUS at 08:55

## 2024-04-29 RX ADMIN — INSULIN LISPRO 4 UNITS: 100 INJECTION, SOLUTION INTRAVENOUS; SUBCUTANEOUS at 17:54

## 2024-04-29 RX ADMIN — PRAVASTATIN SODIUM 40 MG: 40 TABLET ORAL at 17:51

## 2024-04-29 RX ADMIN — LATANOPROST 1 DROP: 50 SOLUTION OPHTHALMIC at 21:51

## 2024-04-29 RX ADMIN — TAMSULOSIN HYDROCHLORIDE 0.4 MG: 0.4 CAPSULE ORAL at 09:02

## 2024-04-29 RX ADMIN — ASPIRIN 81 MG: 81 TABLET, COATED ORAL at 09:02

## 2024-04-29 RX ADMIN — INSULIN LISPRO 3 UNITS: 100 INJECTION, SOLUTION INTRAVENOUS; SUBCUTANEOUS at 12:35

## 2024-04-29 RX ADMIN — CEFEPIME 2000 MG: 2 INJECTION, POWDER, FOR SOLUTION INTRAVENOUS at 09:27

## 2024-04-29 RX ADMIN — INSULIN DETEMIR 20 UNITS: 100 INJECTION, SOLUTION SUBCUTANEOUS at 21:50

## 2024-04-29 RX ADMIN — IOHEXOL 85 ML: 350 INJECTION, SOLUTION INTRAVENOUS at 17:30

## 2024-04-29 RX ADMIN — SODIUM CHLORIDE 75 ML/HR: 0.9 INJECTION, SOLUTION INTRAVENOUS at 12:31

## 2024-04-29 RX ADMIN — INSULIN LISPRO 2 UNITS: 100 INJECTION, SOLUTION INTRAVENOUS; SUBCUTANEOUS at 21:49

## 2024-04-29 RX ADMIN — CEFEPIME 2000 MG: 2 INJECTION, POWDER, FOR SOLUTION INTRAVENOUS at 18:58

## 2024-04-29 RX ADMIN — HEPARIN SODIUM 5000 UNITS: 5000 INJECTION INTRAVENOUS; SUBCUTANEOUS at 21:48

## 2024-04-29 NOTE — ASSESSMENT & PLAN NOTE
"Patient with approximately 8x6 cm soft tissue lump on right posterior neck with overlying 1.5 cm round cystic nodule. Per patient and friend Cindy, he has had this \"lump\" on his neck for a long time. Unclear if referring to cyst or larger mass below    CT neck:   No neck abscess, as clinically questioned.  7.0 cm subcutaneous lipoma in right posterior paramidline neck. Given internal wispy fat heterogeneity, atypical lipomatous tumor in the differential.  1.3 cm subcutaneous lesion in right posterior upper neck laterally adjacent to the fatty lesion, likely sebaceous/epidermal inclusion cyst.  No suspicious cervical lymphadenopathy.    Plan  Outpatient follow-up general surgery/ENT  "

## 2024-04-29 NOTE — ASSESSMENT & PLAN NOTE
EMS called by neighbor due to AMS.  Is AOx4 at baseline.  Currently alert to zero but follows simple commands.  Nonfocal neuro exam.  Able to answer simple yes and no questions.  Improving. Suspect in setting of acute infection.   CTH: no acute findings  Coma panel negative  Urine culture negative, blood cx x1 showing Klebsiella  Continues to be AAOx0    Plan  Broaden abx to cefepime  Monitor mental status, supportive cares as above  Delirium precautions  Urinary retention protocol  Procalcitonin add on

## 2024-04-29 NOTE — PLAN OF CARE
Problem: OCCUPATIONAL THERAPY ADULT  Goal: Performs self-care activities at highest level of function for planned discharge setting.  See evaluation for individualized goals.  Description: Treatment Interventions: ADL retraining, Functional transfer training, UE strengthening/ROM, Endurance training, Cognitive reorientation, Equipment evaluation/education, Patient/family training, Compensatory technique education, Energy conservation, Activityengagement          See flowsheet documentation for full assessment, interventions and recommendations.   Note: Limitation: Decreased ADL status, Decreased Safe judgement during ADL, Decreased UE strength, Decreased cognition, Decreased endurance, Decreased self-care trans, Decreased high-level ADLs, Decreased sensation  Prognosis: Good  Assessment: Pt is a 88 y.o. male seen for OT evaluation s/p admit to [facility] on 4/27/2024 w/ Acute metabolic encephalopathy, confusion, EMS called by neighbor as heard patient yelling.  Comorbidities affecting pt's functional performance at time of assessment include: UTI, sepsis, HTN, DM II. CT head 4/29/2024: No acute intracranial abnormality. Personal factors affecting pt at time of IE include:limited home support, behavioral pattern, difficulty performing ADLS, difficulty performing IADLS , limited insight into deficits, flat affect, and decreased initiation and engagement , change in mental status and alertness during session first 10 minutes oriented and answering questions appropriately and then decreased responsiveness, rigidity and RN aware. Prior to admission, pt was living alone and pt reports requiresper pt independent w/ ADLs, independent w/ functional transfers and mobility w/ SPC, independent w/ IADLs, driving . Upon evaluation: Pt  MAX assist grooming, max-total assist UB ADLs, total assist LB ADLs, total assist toileting hygiene (incontinent of bowel movement), MAX assist rolling in bed, MOD assist x2 supine>sit bed mobility,  MAX assist x2 sit<>stand unable to achieve full upright (flexed posture at knees and Right lean) 2* the following deficits impacting occupational performance: fluctuations in cognition to decreased responsiveness, rigidity of UEs/LEs, impaired strength and endurance, flat affect, inconsistently following commands, impaired activity tolerance, fall risk, multiple lines, R lateral lean noted in sitting and standing, poor balance, decreased coordination, flat affect. Pt to benefit from continued skilled OT tx while in the hospital to address deficits as defined above and maximize level of functional independence w ADL's and functional mobility. Occupational Performance areas to address include: grooming, bathing/shower, toilet hygiene, dressing, health maintenance, functional mobility, clothing management, cleaning, and meal prep, formal cognitive assessment. From OT standpoint, recommendation at time of d/c would be level II moderate resources.   The patient's raw score on the AM-PAC Daily Activity Inpatient Short Form is 8. A raw score of less than 19 suggests the patient may benefit from discharge to post-acute rehabilitation services. Please refer to the recommendation of the Occupational Therapist for safe discharge planning.  Recommendation: Geriatric Consult  Rehab Resource Intensity Level, OT: II (Moderate Resource Intensity)

## 2024-04-29 NOTE — OCCUPATIONAL THERAPY NOTE
Occupational Therapy Evaluation     Patient Name: Jeffery Keys  Today's Date: 4/29/2024  Problem List  Principal Problem:    Acute metabolic encephalopathy  Active Problems:    Sepsis (HCC)    DM2 (diabetes mellitus, type 2) (HCC)    HTN (hypertension), benign    UTI (urinary tract infection)    Bacteremia    Abnormality of soft tissue on posterior neck    Past Medical History  Past Medical History:   Diagnosis Date    Cardiac disease     Diabetes mellitus (HCC)     Hyperlipidemia     Hypertension     Hyperthyroidism      Past Surgical History  Past Surgical History:   Procedure Laterality Date    CHOLECYSTECTOMY      HERNIA REPAIR           04/29/24 0912   OT Last Visit   OT Visit Date 04/29/24   Note Type   Note type Evaluation  (treatment)   Pain Assessment   Pain Assessment Tool FLACC   Pain Rating: FLACC (Rest) - Face 0   Pain Rating: FLACC (Rest) - Legs 0   Pain Rating: FLACC (Rest) - Activity 0   Pain Rating: FLACC (Rest) - Cry 0   Pain Rating: FLACC (Rest) - Consolability 0   Score: FLACC (Rest) 0   Pain Rating: FLACC (Activity) - Face 0   Pain Rating: FLACC (Activity) - Legs 0   Pain Rating: FLACC (Activity) - Activity 0   Pain Rating: FLACC (Activity) - Cry 0   Pain Rating: FLACC (Activity) - Consolability 0   Score: FLACC (Activity) 0   Restrictions/Precautions   Weight Bearing Precautions Per Order No   Other Precautions Cognitive;Chair Alarm;Bed Alarm;Multiple lines;Fall Risk;Aspiration  (soft mits)   Home Living   Type of Home Apartment   Home Layout One level;Able to live on main level with bedroom/bathroom;Performs ADLs on one level;Elevator   Bathroom Shower/Tub Tub/shower unit   Bathroom Toilet Raised   Bathroom Equipment Tub transfer bench;Grab bars in shower;Commode   Bathroom Accessibility Accessible   Home Equipment Walker;Cane;Wheelchair-manual   Additional Comments pt reports using SPC at baseline in apt, has a friend Cindy who brings him dinner every night, he makes a light breakfast  "and lunch   Prior Function   Level of [place] Independent with ADLs;Independent with functional mobility;Independent with IADLS;Needs assistance with IADLS  (reports meals from frineds)   Lives With Alone   Receives Help From Family;Friend(s)   IADLs Independent with driving;Independent with meal prep;Independent with medication management   Falls in the last 6 months 0   Vocational Retired   Comments pt was alert and answering questions appropriately and then pt w/ flat affect and blank stare and not answering questions and RN aware and reports pt fluctuating like that since yesterday   Lifestyle   Autonomy per pt independent w/ ADLs, independent w/ functional transfers and mobility w/ SPC, independent w/ IADLs, driving   Reciprocal Relationships friends, family   Service to Others retired   Intrinsic Gratification watch tv   Subjective   Subjective \"I am okay, I am in the hospital\"   ADL   Where Assessed Edge of bed   Eating Assistance 3  Moderate Assistance   Grooming Assistance 2  Maximal Assistance   UB Bathing Assistance 2  Maximal Assistance   LB Bathing Assistance 1  Total Assistance   UB Dressing Assistance 2  Maximal Assistance   LB Dressing Assistance 1  Total Assistance   Toileting Assistance  1  Total Assistance   Functional Assistance 1  Total Assistance   Bed Mobility   Rolling R 2  Maximal assistance   Additional items Assist x 1;Increased time required;Bedrails;HOB elevated;Verbal cues;LE management   Rolling L 2  Maximal assistance   Additional items Assist x 1;Increased time required;Verbal cues;LE management;HOB elevated;Bedrails   Supine to Sit 3  Moderate assistance   Additional items Assist x 2;Increased time required;Verbal cues;LE management;Bedrails;HOB elevated   Sit to Supine 2  Maximal assistance   Additional items Assist x 2;Increased time required;Verbal cues;LE management;Bedrails;HOB elevated   Additional Comments pt w/ close supervision w/ b/l UE supports on rail to mod assist " to maintain balance EOB w/ significant lean to Rside and rigidity , pt w/ max cues for upright posture and at times spontaenously recorrecting self, however still w/ R lean slightly; /64   Transfers   Sit to Stand 2  Maximal assistance   Additional items Assist x 2;Increased time required;Verbal cues;Armrests;Bedrails   Stand to Sit 1  Dependent   Additional items Assist x 2;Increased time required;Verbal cues;Armrests   Stand pivot Unable to assess   Additional Comments attempted 2 trials sit<>Stand, flexed posture of knees and retropulsive w/ R lean noted and decreased support of pushing through walker   Functional Mobility   Additional Comments recommend koko LLANOS at this time   Balance   Static Sitting Poor +   Dynamic Sitting Poor   Static Standing Zero   Activity Tolerance   Activity Tolerance Patient limited by fatigue;Patient limited by pain;Treatment limited secondary to medical complications (Comment)   Medical Staff Made Aware PT Sonia: Pt seen for co-session with skilled Physical therapist 2* clinically unstable presentation, medical complexity, new precautions, performance deficits/functional limitations, impaired cognition/safety awareness, limited activity tolerance and present impairments which are a regression from patient patient's baseline and impacting overall occupational performance   Nurse Made Aware appropriate to see per Rajwinder MONROY who was present during session   RUE Assessment   RUE Assessment WFL  (grossly 3+/5, rigidity noted, difficulty following commands for MMT)   LUE Assessment   LUE Assessment WFL  (grossly 3+/5, rigidity noted, difficulty following commands for MMT)   Hand Function   Gross Motor Coordination Impaired  (rigidity noted in UEs)   Fine Motor Coordination Impaired   Hand Function Comments decreased coordination and manipulation of items   Sensation   Light Touch Not tested   Vision-Basic Assessment   Current Vision Wears glasses all the time   Vision - Complex  Assessment   Ocular Range of Motion Intact   Additional Comments when attempting to test vision pt then decreased responses and blank staring noted and RN aware   Cognition   Overall Cognitive Status Impaired   Arousal/Participation Persistent stimuli required;Poorly responsive   Attention Difficulty attending to directions   Orientation Level Oriented to place;Oriented to person;Oriented to time;Disoriented to situation  (initially oriented x3 and then 10min into session pt w/ decreased responsiveness and just nodding head and occaisional yes/or no answer RN aware)   Memory Decreased short term memory;Decreased recall of recent events;Decreased recall of precautions   Following Commands Follows one step commands inconsistently   Comments pt initially answering orientation questions and home setup and engages in conversations and then 10 min into session pt w/ change in cognition blank stare not verbalizing answers or  following directions and increased rigidity, RN teri present and aware and reports pt fluctuating RN reports she will talk to MD about a possible repeated head scan; eventually will benefit from ACLS   Assessment   Limitation Decreased ADL status;Decreased Safe judgement during ADL;Decreased UE strength;Decreased cognition;Decreased endurance;Decreased self-care trans;Decreased high-level ADLs;Decreased sensation   Prognosis Good   Assessment Pt is a 88 y.o. male seen for OT evaluation s/p admit to [facility] on 4/27/2024 w/ Acute metabolic encephalopathy, confusion, EMS called by neighbor as heard patient yelling.  Comorbidities affecting pt's functional performance at time of assessment include: UTI, sepsis, HTN, DM II. CT head 4/29/2024: No acute intracranial abnormality. Personal factors affecting pt at time of IE include:limited home support, behavioral pattern, difficulty performing ADLS, difficulty performing IADLS , limited insight into deficits, flat affect, and decreased initiation and engagement  , change in mental status and alertness during session first 10 minutes oriented and answering questions appropriately and then decreased responsiveness, rigidity and RN aware. Prior to admission, pt was living alone and pt reports requiresper pt independent w/ ADLs, independent w/ functional transfers and mobility w/ SPC, independent w/ IADLs, driving . Upon evaluation: Pt  MAX assist grooming, max-total assist UB ADLs, total assist LB ADLs, total assist toileting hygiene (incontinent of bowel movement), MAX assist rolling in bed, MOD assist x2 supine>sit bed mobility, MAX assist x2 sit<>stand unable to achieve full upright (flexed posture at knees and Right lean) 2* the following deficits impacting occupational performance: fluctuations in cognition to decreased responsiveness, rigidity of UEs/LEs, impaired strength and endurance, flat affect, inconsistently following commands, impaired activity tolerance, fall risk, multiple lines, R lateral lean noted in sitting and standing, poor balance, decreased coordination, flat affect. Pt to benefit from continued skilled OT tx while in the hospital to address deficits as defined above and maximize level of functional independence w ADL's and functional mobility. Occupational Performance areas to address include: grooming, bathing/shower, toilet hygiene, dressing, health maintenance, functional mobility, clothing management, cleaning, and meal prep, formal cognitive assessment. From OT standpoint, recommendation at time of d/c would be level II moderate resources.   The patient's raw score on the AM-PAC Daily Activity Inpatient Short Form is 8. A raw score of less than 19 suggests the patient may benefit from discharge to post-acute rehabilitation services. Please refer to the recommendation of the Occupational Therapist for safe discharge planning.   Goals   Patient Goals unable to reports 2* cognition   LTG Time Frame 10-14   Long Term Goal please see below goals   Plan    Treatment Interventions ADL retraining;Functional transfer training;UE strengthening/ROM;Endurance training;Cognitive reorientation;Equipment evaluation/education;Patient/family training;Compensatory technique education;Energy conservation;Activityengagement   Goal Expiration Date 05/13/24   OT Treatment Day 1   OT Frequency 3-5x/wk   Discharge Recommendation   Recommendation Geriatric Consult   Rehab Resource Intensity Level, OT II (Moderate Resource Intensity)   AM-PAC Daily Activity Inpatient   Lower Body Dressing 1   Bathing 1   Toileting 1   Upper Body Dressing 1   Grooming 2   Eating 2   Daily Activity Raw Score 8   Turning Head Towards Sound 2   Follow Simple Instructions 2  (calculated on pt 2nd half of session performance)   Low Function Daily Activity Raw Score 12   Low Function Daily Activity Standardized Score  21.38   AM-PAC Applied Cognition Inpatient   Following a Speech/Presentation 2  (calculated based on 2nd half of performance)   Understanding Ordinary Conversation 2   Taking Medications 1   Remembering Where Things Are Placed or Put Away 1   Remembering List of 4-5 Errands 1   Taking Care of Complicated Tasks 1   Applied Cognition Raw Score 8   Applied Cognition Standardized Score 19.32   Modified Moreno Scale   Modified Lucan Scale 5   Additional Treatment Session   Start Time 0859   End Time 0912   Treatment Assessment Pt seen for skilled OT session focused on bed mobility, rolling, and repositioning. Pt w/ assist x2 dependent sit>supine bed mobility w/ increased time to complete. Pt w/ MAX assist rolling in bed and total assist toileting cleanup as incontinent of bowel movement. Pt w/ repositioning in bed w/ MAX assist x2 to upright position and decreased lean noted in R side. Pt w/ decreased rigidity noted then near end of session. BP: 159/90 as pt reported he was dizzy. Pt w/ MAX assist grooming to wash face. Pt RN aware pt eyes blood shot. Pt seated upright in bed end of session. Pt  continues to be limited due to: fluctuations in cognition to decreased responsiveness, rigidity of UEs/LEs, impaired strength and endurance, flat affect, inconsistently following commands, impaired activity tolerance, fall risk, multiple lines, R lateral lean noted in sitting and standing, poor balance, decreased coordination, flat affect. Recommend level II maximum resources when medically stable. Will continue to follow.   The patient's raw score on the AM-PAC Daily Activity Inpatient Short Form is 8. A raw score of less than 19 suggests the patient may benefit from discharge to post-acute rehabilitation services. Please refer to the recommendation of the Occupational Therapist for safe discharge planning.   Additional Treatment Day 1   End of Consult   Education Provided Yes   Patient Position at End of Consult Bed/Chair alarm activated;All needs within reach;Supine   Nurse Communication Nurse aware of consult     Occupational Therapy Goals to be met in 10-14 days:  1) Pt will improve activity tolerance to G for 30 min txment sessions to enhance ADLs  2) Pt will complete ADLs/self care w/ min assist  3) Pt will complete toileting w/ min assist w/ G hygiene/thoroughness using DME PRN  4) Pt will improve functional transfers on/off all surfaces using DME PRN w/ G balance/safety including toileting w/ min assist  5) OTR to assess functional mobility as appropriate to establish goals  6) Pt will engage in ongoing cognitive assessment w/ G participation to A w/ safe d/c planning/recommendations  7) Pt will demonstrate G carryover of pt/caregiver education and training as appropriate w/ mod I  w/ G tolerance  8) Pt will engage in depression screen/leisure interest checklist w/ G participation to monitor s/s depression and ID 3 positive coping strategies to A w/ emotional regulation and management  9) Pt will demonstrate 100% carryover of E.C. techniques w/ mod I t/o fx'l I/ADL/leisure tasks w/o cues s/p skilled  education  10) Pt will tolerate bed mobility and EOB seated tasks w/ min A for 30 mins to engage in fx'l I/ADL/leisure tasks w/ min A w/ min cues  11) Pt will engage in activity configuration activity w/ G participation and mod I to increase time management skills and improve participation in a structured routine to improve overall quality of life  12) Pt will demonstrate improved standing tolerance to 3-5 minutes during functional tasks w/ no LOB to enhance ADL performance  13) Pt will demonstrate improved b/l UE strength by 1 MMT grade to enhance ADLS and functional transfers  14) Pt will follow 100% simple 1 step commands and be A&Ox 3 t/o OT sessions    Documentation completed by: Gunjan Wilkinson MS, OTR/L

## 2024-04-29 NOTE — ASSESSMENT & PLAN NOTE
CT CAP: diffuse bladder wall thickening, pericystic inflammatory change.  UA: 1-2 WBC, occasional bacteria  Urine culture no growth  Broaden to cefepime

## 2024-04-29 NOTE — CASE MANAGEMENT
Case Management Assessment & Discharge Planning Note    Patient name Jeffery Keys  Location S /S -01 MRN [id number]  : 1935 Date 2024       Current Admission Date: 2024  Current Admission Diagnosis:Acute metabolic encephalopathy   Patient Active Problem List    Diagnosis Date Noted    Bacteremia 2024    UTI (urinary tract infection) 2024    Acute metabolic encephalopathy 2024    Syncope 2023    Left inguinal hernia 2023    Ambulatory dysfunction 2023    DM2 (diabetes mellitus, type 2) (HCC) 2017    HTN (hypertension), benign 2017    HLD (hyperlipidemia) 2017    Epididymo-orchitis 2017    Sepsis (HCC) 2017      LOS (days): 2  Geometric Mean LOS (GMLOS) (days):   Days to GMLOS:     OBJECTIVE:    Risk of Unplanned Readmission Score: 12.76         Current admission status: Inpatient       Preferred Pharmacy:   [facility]/pharmacy #[id number] - [place] [address] [address]  Phone: [phone] Fax: [phone]    Primary Care Provider: DELILAH Mcpherson    Primary Insurance: MEDICARE  Secondary Insurance: AARP    ASSESSMENT:  Active Health Care Proxies    There are no active Health Care Proxies on file.                      Patient Information  Admitted from:: Home  Mental Status: Alert, Confused  During Assessment patient was accompanied by: Not accompanied during assessment  Assessment information provided by:: Patient  Primary Caregiver: Self  Support Systems: Self, Friends/neighbors  County of Residence: [place]  What city do you live in?: [place]  Home entry access options. Select all that apply.: Elevator  Type of Current Residence: Apartment  Floor Level: 3  Upon entering residence, is there a bedroom on the main floor (no further steps)?: Yes  Upon entering residence, is there a bathroom on the main floor (no further steps)?: Yes  Living Arrangements: Lives Alone    Activities of  Daily Living Prior to Admission  Ambulates independently?: Yes  Does patient use assisted devices?: Yes  Assisted Devices (DME) used: Straight Cane  Does patient currently own DME?: Yes  What DME does the patient currently own?: Straight Cane                              DISCHARGE DETAILS:    Discharge planning discussed with:: patient at bedside; call made to emergency contact, Cindy and message left with Al requesting return call  Freedom of Choice: Yes (re: rehab)     CM contacted family/caregiver?: Yes             Contacts  Patient Contacts: Cindy, friend  Relationship to Patient:: Friend  Contact Method: Phone  Phone Number: [phone]  Reason/Outcome: Emergency Contact              Other Referral/Resources/Interventions Provided:  Interventions: Short Term Rehab, SNF  Referral Comments: Patient admitted due to acute metabolic encephalopathy. Met with patient at bedside, but patient inconsistently answering questions. Confirmed he lives in [facility] (address on file) in [place] and had been living there alone. Reports that next of kin are two children - Krishna who lives in [place], and Elen who lives with her mother in [place] - but reports he doesn't have much interaction with them. Inquired about emergency contacts - he states that Daisy Yun passed away (number not in service), and Cindy is upstairs neighbor. Patient states that he ambulates with a cane at baseline. Patient currently wearing mitts because he was chewing on fingernails/fingers. PT/OT recommending rehab. Call made to patient's neighbor, Cindy ([phone]), and spoke with Al who took a message - reports Cindy's phone isn't working, but can reach him at this number until it's working again. Return number provided for Cindy to call back. Will need to confirm information provided by patient is accurate. Awaiting return call from emergency contact to discuss d/c plan/rehab recommendation.    Would you like to participate in  our [facility] service program?  : No - Declined    Treatment Team Recommendation: Short Term Rehab, SNF  Discharge Destination Plan:: SNF, Short Term Rehab

## 2024-04-29 NOTE — CASE MANAGEMENT
Case Management Progress Note    Patient name Jeffery Keys  Location S /S -01 MRN [id number]  : 1935 Date 2024       LOS (days): 2  Geometric Mean LOS (GMLOS) (days):   Days to GMLOS:        OBJECTIVE:        Current admission status: Inpatient  Preferred Pharmacy:   [facility]pharm[id number] - [place] - [address]  [address] [facility]  [place] [address]  Phone: [phone] Fax: [phone]    Primary Care Provider: DELILAH Mcpherson    Primary Insurance: MEDICARE  Secondary Insurance: AARP    PROGRESS NOTE:    Return call received from patient's neighbor/friend, Cindy. Cindy confirmed information patient provided - states that he lives in the [facility], is usually independent with ADLs and ambulation - reports that he tends to walk a lot and he checks on him daily to make sure he's taking his medications and eating ok. States that he was sent to the hospital as he wasn't able to get up out of his chair.     Cindy relays he does not have any contact with his children for many years, but reports he believes he's in touch with his sisters, but Cindy does not have any contact information for any of his family members. Confirmed that he is ok with being contacted for discharge planning, but does not believe he has POA, although states they had discussed him being the patient's POA in the past.     Cindy aware of recommendation for STR at this time, and requesting [facility] as his preferred rehab secondary to location to where they live. Aware that this writer will follow-up closer to discharge re: plan.     Referral sent to [facility] and they confirm ability to accept once clear for d/c. Patient will need 3-night stay. Facility reserved in [facility] at this time.

## 2024-04-29 NOTE — PLAN OF CARE
Problem: Potential for Falls  Goal: Patient will remain free of falls  Description: INTERVENTIONS:  - Educate patient/family on patient safety including physical limitations  - Instruct patient to call for assistance with activity   - Consult OT/PT to assist with strengthening/mobility   - Keep Call bell within reach  - Keep bed low and locked with side rails adjusted as appropriate  - Keep care items and personal belongings within reach  - Initiate and maintain comfort rounds  - Make Fall Risk Sign visible to staff  - Offer Toileting every  Hours, in advance of need  - Initiate/Maintain alarm  - Obtain necessary fall risk management equipmen  - Apply yellow socks and bracelet for high fall risk patients  - Consider moving patient to room near nurses station  Outcome: Progressing     Problem: Prexisting or High Potential for Compromised Skin Integrity  Goal: Skin integrity is maintained or improved  Description: INTERVENTIONS:  - Identify patients at risk for skin breakdown  - Assess and monitor skin integrity  - Assess and monitor nutrition and hydration status  - Monitor labs   - Assess for incontinence   - Turn and reposition patient  - Assist with mobility/ambulation  - Relieve pressure over bony prominences  - Avoid friction and shearing  - Provide appropriate hygiene as needed including keeping skin clean and dry  - Evaluate need for skin moisturizer/barrier cream  - Collaborate with interdisciplinary team   - Patient/family teaching  - Consider wound care consult   Outcome: Progressing     Problem: SAFETY,RESTRAINT: NV/NON-SELF DESTRUCTIVE BEHAVIOR  Goal: Remains free of harm/injury (restraint for non violent/non self-detsructive behavior)  Description: INTERVENTIONS:  - Instruct patient/family regarding restraint use   - Assess and monitor physiologic and psychological status   - Provide interventions and comfort measures to meet assessed patient needs   - Identify and implement measures to help patient  regain control  - Assess readiness for release of restraint   Outcome: Progressing  Goal: Returns to optimal restraint-free functioning  Description: INTERVENTIONS:  - Assess the patient's behavior and symptoms that indicate continued need for restraint  - Identify and implement measures to help patient regain control  - Assess readiness for release of restraint   Outcome: Progressing

## 2024-04-29 NOTE — PHYSICAL THERAPY NOTE
PHYSICAL THERAPY TREATMENT  DATE: 04/29/24  TIME: 0844-0913    NAME:  Jeffery Keys  AGE:   88 y.o.  Mrn:   [id number]  Length Of Stay: 2    ADMIT DX:  Cystitis [N30.90]  Encephalopathy [G93.40]  Sepsis (HCC) [A41.9]    Past Medical History:   Diagnosis Date    Cardiac disease     Diabetes mellitus (HCC)     Hyperlipidemia     Hypertension     Hyperthyroidism      Past Surgical History:   Procedure Laterality Date    CHOLECYSTECTOMY      HERNIA REPAIR         Performed at least 2 patient identifiers during session: Name and ID bracelet     04/29/24 0844   PT Last Visit   PT Visit Date 04/29/24   Note Type   Note Type Treatment   Pain Assessment   Pain Assessment Tool FLACC   Pain Rating: FLACC (Rest) - Face 0   Pain Rating: FLACC (Rest) - Legs 0   Pain Rating: FLACC (Rest) - Activity 0   Pain Rating: FLACC (Rest) - Cry 0   Pain Rating: FLACC (Rest) - Consolability 0   Score: FLACC (Rest) 0   Pain Rating: FLACC (Activity) - Face 0   Pain Rating: FLACC (Activity) - Legs 0   Pain Rating: FLACC (Activity) - Activity 0   Pain Rating: FLACC (Activity) - Cry 0   Pain Rating: FLACC (Activity) - Consolability 0   Score: FLACC (Activity) 0   Restrictions/Precautions   Weight Bearing Precautions Per Order No   Other Precautions Cognitive;Chair Alarm;Bed Alarm;Multiple lines;Telemetry;Fall Risk;Aspiration   General   Chart Reviewed Yes   Additional Pertinent History Pt admitted 4/27/24 with AMS, urinary incontinence, found by neighbors. Dx: Acute metabolic encephalopathy.   Response to Previous Treatment Patient unable to report, no changes reported from family or staff   Family/Caregiver Present No   Cognition   Overall Cognitive Status Impaired   Arousal/Participation Poorly responsive;Persistent stimuli required   Attention Difficulty attending to directions   Orientation Level Unable to assess   Memory Unable to assess   Following Commands Unable to follow one step commands   Comments Pt with significant change in  alertness/engagement, communication status, and command following as compared to previous PT session.   Subjective   Subjective Pt with little to no verbalizations t/o session.   Bed Mobility   Rolling R 2  Maximal assistance   Additional items Assist x 1;Increased time required;Verbal cues;LE management  (trunk management; dependent placement of hand on bedrail, pt then able to grasp bedrail)   Rolling L 2  Maximal assistance   Additional items Assist x 1;Bedrails;Increased time required;Verbal cues;LE management  (trunk management; dependent placement of hand on bedrail, pt then able to grasp bedrail)   Supine to Sit 3  Moderate assistance   Additional items Assist x 2;HOB elevated;Bedrails;Increased time required;Verbal cues;LE management  (trunk management)   Sit to Supine 2  Maximal assistance   Additional items Assist x 2;Increased time required;Verbal cues;LE management  (trunk management)   Additional Comments Pt needing variable close S to MODA to maintain upright sitting balance at EOB ~15 minutes, intermittent u/l UE support on bedrails (after dependent placement of hands by therapist). Pt with significant R sided trunk lean, unable to correct with therapist cues however pt spontaneously corrects intermittently. MAXA for rolling R<>L in bed, dependent placement of hands for grasp on bedrail; dependent hygiene care completed.   Transfers   Sit to Stand 2  Maximal assistance   Additional items Assist x 2;Bedrails;Increased time required;Verbal cues  (RW)   Stand to Sit 1  Dependent   Additional items Assist x 2;Verbal cues  (RW)   Stand pivot Unable to assess   Additional Comments STS from EOB attempted x2 trials with RW. Despite MAXA x2 person for each trial, unable to achieve full upright stand, B knees flexed but not buckling, severe retropulsion, poor UE support.   Ambulation/Elevation   Gait pattern Not appropriate;Not tested   Balance   Static Sitting Poor +   Dynamic Sitting Poor   Static Standing  Zero   Ambulatory Zero   Endurance Deficit   Endurance Deficit Yes   Activity Tolerance   Activity Tolerance Patient limited by fatigue;Other (Comment)  (impaired cognitive communication status, impaired command following)   Nurse Made Aware Spoke with RN, CM, OT   Assessment   Prognosis Guarded   Problem List Decreased strength;Decreased range of motion;Decreased endurance;Impaired balance;Decreased mobility;Decreased coordination;Decreased cognition;Impaired judgement;Decreased safety awareness   Assessment Pt seen for PT treatment 4/29/2024 with focus on: transfers and mobility training. Pt presents semi-supine in bed, overall flat affect, little to no verbalizations. Per RN, pt with variable presentation t/o admission. Pt participates in therapeutic activity, with intervention focusing on goal of increased mobility independence and tolerance, increased engagement. Pt requires MODA 2 person to achieve upright sitting at EOB, variable MODA to S to maintain upright sitting at EOB, significant R trunk lean, STS from EOB attempted x2 trials however unsuccessful despite MAXA 2 person +RW. RN in room t/o session, and in communication with physician re: significant change in mental/communication status. Overall pt displays no functional improvement (instead a functional decline), continues to perform below their baseline level of functional mobility due to weakness, impaired balance, impaired cognitive communication status. Pt continues to most appropriately benefit from Level II (moderate PT intensity) resources upon d/c from the acute care setting. Continue skilled PT POC as able and appropriate in order to progress towards therapy goals and maximize independence with functional mobility. Next session, plan for intervention of increased mobility/transfers as able and appropriate.   Barriers to Discharge Decreased caregiver support   Goals   Patient Goals unable to report rehab goals on this date; to return to PLOF    STG Expiration Date 05/08/24   Short Term Goal #1 Pt will be able to: (1) perform bed mobility with supervision to promote OOB activity (2) perform sit to stand with supervision to decrease burden of care (3) ambulate at least 200` with supervision and least restrictive AD to increase activity tolerance (4) increase standing balance by 1 grade to decrease risk of falls   PT Treatment Day 1   Plan   Treatment/Interventions Functional transfer training;LE strengthening/ROM;Therapeutic exercise;Endurance training;Cognitive reorientation;Patient/family training;Equipment eval/education;Bed mobility;Gait training;Continued evaluation;Spoke to nursing;Spoke to case management   Progress No functional improvements   PT Frequency 3-5x/wk   Discharge Recommendation   Rehab Resource Intensity Level, PT II (Moderate Resource Intensity)   AM-PAC Basic Mobility Inpatient   Turning in Flat Bed Without Bedrails 1   Lying on Back to Sitting on Edge of Flat Bed Without Bedrails 1   Moving Bed to Chair 1   Standing Up From Chair Using Arms 1   Walk in Room 1   Climb 3-5 Stairs With Railing 1   Basic Mobility Inpatient Raw Score 6   Turning Head Towards Sound 1   Follow Simple Instructions 1   Low Function Basic Mobility Raw Score  8   Low Function Basic Mobility Standardized Score  10.37   [facility] Highest Level Of Mobility   -[facility] Goal 2: Bed activities/Dependent transfer   -[facility] Achieved 3: Sit at edge of bed   Education   Education Provided Mobility training   Patient Reinforcement needed   End of Consult   Patient Position at End of Consult Supine;Bed/Chair alarm activated;All needs within reach         This session, pt required and most appropriately benefited from partial or full skilled PT/OT co-treat due to extensive physical assistance of SKILLED therapists, cognitive-communication impairments, cognitive-behavioral deficits, significant regression from baseline level of mobility, decreased activity tolerance, and  unpredictable medical and/or functional status. PT and OT goals were addressed separately as seen in documentation.    Based on patient's [facility] Highest Level of Mobility scores today, patient currently has a goal of -[facility] Levels: 3: SIT AT EDGE OF BED, to be completed with RN staffing each shift, in order to improve overall activity tolerance and mobility, combat hospital related deconditioning, and maximize outcomes for d/c from the acute care setting.     The patient's AM-PAC Basic Mobility Inpatient Short Form Raw Score is 6. A Raw score of less than or equal to 16 suggests the patient may benefit from discharge to post-acute rehabilitation services. Please also refer to the recommendation of the Physical Therapist for safe discharge planning.      Sonia Denson, PT, DPT   Available via [employer]  [facility] # [phone]  [place] License - [id number]  4/29/2024

## 2024-04-29 NOTE — ASSESSMENT & PLAN NOTE
Fever, tachycardia, leukocytosis.   Suspected source: cystitis noted on imaging, however, urine cx negative  Blood cx x1 showing klebsiella    Plan  Broaden to cefepime as patient continues to be encephalopathic with leukocytosis  Follow-up blood cx sensitivities

## 2024-04-29 NOTE — PLAN OF CARE
Problem: Potential for Falls  Goal: Patient will remain free of falls  Description: INTERVENTIONS:  - Educate patient/family on patient safety including physical limitations  - Instruct patient to call for assistance with activity   - Consult OT/PT to assist with strengthening/mobility   - Keep Call bell within reach  - Keep bed low and locked with side rails adjusted as appropriate  - Keep care items and personal belongings within reach  - Initiate and maintain comfort rounds  - Make Fall Risk Sign visible to staff  - Offer Toileting every  Hours, in advance of need  - Initiate/Maintain alarm  - Obtain necessary fall risk management equipment:   - Apply yellow socks and bracelet for high fall risk patients  - Consider moving patient to room near nurses station  4/28/2024 2026 by Debra Gipson RN  Outcome: Progressing  4/28/2024 1931 by Debra Gipson RN  Outcome: Progressing  4/28/2024 1742 by Debra Gipson, RN  Outcome: Progressing

## 2024-04-29 NOTE — ASSESSMENT & PLAN NOTE
Urine culture negative, blood cx x1 showing Klebsiella    Procal trending down  AAO x2 today, improving    Plan  Narrow to Ancef based on blood cx susceptibilities  Delirium precautions  Urinary retention protocol  Continuous IVF until PO intake improves - dry on exam

## 2024-04-29 NOTE — PLAN OF CARE
Problem: PHYSICAL THERAPY ADULT  Goal: Performs mobility at highest level of function for planned discharge setting.  See evaluation for individualized goals.  Description: Treatment/Interventions: Functional transfer training, LE strengthening/ROM, Therapeutic exercise, Endurance training, Cognitive reorientation, Patient/family training, Bed mobility, Equipment eval/education, Gait training     See flowsheet documentation for full assessment, interventions and recommendations.  Outcome: Not Progressing  Note: Prognosis: Guarded  Problem List: Decreased strength, Decreased range of motion, Decreased endurance, Impaired balance, Decreased mobility, Decreased coordination, Decreased cognition, Impaired judgement, Decreased safety awareness  Assessment: Pt seen for PT treatment 4/29/2024 with focus on: transfers and mobility training. Pt presents semi-supine in bed, overall flat affect, little to no verbalizations. Per RN, pt with variable presentation t/o admission. Pt participates in therapeutic activity, with intervention focusing on goal of increased mobility independence and tolerance, increased engagement. Pt requires MODA 2 person to achieve upright sitting at EOB, variable MODA to S to maintain upright sitting at EOB, significant R trunk lean, STS from EOB attempted x2 trials however unsuccessful despite MAXA 2 person +RW. RN in room t/o session, and in communication with physician re: significant change in mental/communication status. Overall pt displays no functional improvement (instead a functional decline), continues to perform below their baseline level of functional mobility due to weakness, impaired balance, impaired cognitive communication status. Pt continues to most appropriately benefit from Level II (moderate PT intensity) resources upon d/c from the acute care setting. Continue skilled PT POC as able and appropriate in order to progress towards therapy goals and maximize independence with  functional mobility. Next session, plan for intervention of increased mobility/transfers as able and appropriate.    Barriers to Discharge: Decreased caregiver support     Rehab Resource Intensity Level, PT: II (Moderate Resource Intensity)    See flowsheet documentation for full assessment.

## 2024-04-29 NOTE — PROGRESS NOTES
"[facility]  Progress Note  Name: Jeffery Keys I  MRN: [id number]  Unit/Bed#: S -Charlie I Date of Admission: 4/27/2024   Date of Service: 4/29/2024 I Hospital Day: 2    Assessment/Plan   * Acute metabolic encephalopathy  Assessment & Plan  EMS called by neighbor due to AMS.  Is AOx4 at baseline.  Currently alert to zero but follows simple commands.  Nonfocal neuro exam.  Able to answer simple yes and no questions.  Improving. Suspect in setting of acute infection.   CTH: no acute findings  Coma panel negative  Urine culture negative, blood cx x1 showing Klebsiella  Continues to be AAOx0    Plan  Broaden abx to cefepime  Monitor mental status, supportive cares as above  Delirium precautions  Urinary retention protocol  Procalcitonin add on    Abnormality of soft tissue on posterior neck  Assessment & Plan  Patient with approximately 8x6 cm soft tissue lump on right posterior neck with overlying 1.5 cm round cystic nodule. Per patient and friend Cindy, he has had this \"lump\" on his neck for a long time. Unclear if referring to cyst or larger mass below    Plan  CT soft tissue neck w/ contrast  IVF 75 mL/hr x10 hours for hydration    Bacteremia  Assessment & Plan  Blood culture x1 showing GNR, Klebsiella  Broaden to cefepime    UTI (urinary tract infection)  Assessment & Plan  CT CAP: diffuse bladder wall thickening, pericystic inflammatory change.  UA: 1-2 WBC, occasional bacteria  Urine culture no growth  Broaden to cefepime    HTN (hypertension), benign  Assessment & Plan  BP elevated  Is not on medication   Monitor for now given acute infection  Prn hydralazine for SBP >180    DM2 (diabetes mellitus, type 2) ([facility])  Assessment & Plan  Lab Results   Component Value Date    HGBA1C 7.5 (H) 04/27/2024   With hyperglycemia likely 2/2 infection  Increase Levemir to 20 units HS and Humalog 3 units TID  Resume metformin on discharge  Accucheck, SSI    Sepsis ([facility])  Assessment & Plan  Fever, " tachycardia, leukocytosis.   Suspected source: cystitis noted on imaging, however, urine cx negative  Blood cx x1 showing klebsiella    Plan  Broaden to cefepime as patient continues to be encephalopathic with leukocytosis  Follow-up blood cx sensitivities           VTE Pharmacologic Prophylaxis: VTE Score: 5 High Risk (Score >/= 5) - Pharmacological DVT Prophylaxis Ordered: heparin. Sequential Compression Devices Ordered.    Mobility:   Basic Mobility Inpatient Raw Score: 6  JH-HLM Goal: 2: Bed activities/Dependent transfer  JH-HLM Achieved: 3: Sit at edge of bed  JH-HLM Goal NOT achieved. Continue with multidisciplinary rounding and encourage appropriate mobility to improve upon JH-HLM goals.    Patient Centered Rounds: I performed bedside rounds with nursing staff today.  Discussions with Specialists or Other Care Team Provider: None    Education and Discussions with Family / Patient:  Will call in afternoon.     Current Length of Stay: 2 day(s)  Current Patient Status: Inpatient   Discharge Plan: Anticipate discharge in 24-48 hrs to unclear    Code Status: Level 1 - Full Code    Subjective:   Patient noted to be hallucinating overnight and pulled out IV. Required soft mittens. Patient awake at bedside but not able to provide any ROS. No grimacing noticed on abdominal palpation. Clammy skin on exam.    Objective:     Vitals:   Temp (24hrs), Av.2 °F (37.9 °C), Min:99.3 °F (37.4 °C), Max:100.9 °F (38.3 °C)    Temp:  [99.3 °F (37.4 °C)-100.9 °F (38.3 °C)] 99.3 °F (37.4 °C)  HR:  [60-91] 60  Resp:  [18] 18  BP: (159-184)/(64-91) 159/90  SpO2:  [96 %-98 %] 97 %  Body mass index is 27.19 kg/m².     Input and Output Summary (last 24 hours):     Intake/Output Summary (Last 24 hours) at 2024 1301  Last data filed at 2024 0726  Gross per 24 hour   Intake 170 ml   Output 670 ml   Net -500 ml       Physical Exam:   Physical Exam  Constitutional:       General: He is not in acute distress.     Appearance: He is  ill-appearing and diaphoretic. He is not toxic-appearing.   HENT:      Mouth/Throat:      Mouth: Mucous membranes are moist.      Pharynx: No oropharyngeal exudate.   Eyes:      General: No scleral icterus.     Conjunctiva/sclera: Conjunctivae normal.   Cardiovascular:      Rate and Rhythm: Normal rate and regular rhythm.      Pulses: Normal pulses.      Heart sounds: No murmur heard.  Pulmonary:      Effort: No respiratory distress.      Breath sounds: No wheezing.   Abdominal:      General: There is no distension.      Tenderness: There is no abdominal tenderness.   Musculoskeletal:      Cervical back: Neck supple. No tenderness.   Lymphadenopathy:      Cervical: No cervical adenopathy.   Skin:     General: Skin is warm.      Coloration: Skin is not jaundiced.   Neurological:      Mental Status: He is disoriented.          Additional Data:     Labs:  Results from last 7 days   Lab Units 04/29/24  0616 04/28/24  0545 04/27/24 1922   WBC Thousand/uL 18.84*   < > 16.68*   HEMOGLOBIN g/dL 16.5   < > 15.9   HEMATOCRIT % 46.5   < > 45.9   PLATELETS Thousands/uL 189   < > 196   BANDS PCT %  --   --  1   SEGS PCT % 89*   < >  --    LYMPHO PCT % 4*   < > 2*   MONO PCT % 7   < > 6   EOS PCT % 0   < > 0    < > = values in this interval not displayed.     Results from last 7 days   Lab Units 04/29/24  0616 04/28/24  0545 04/27/24 1922   SODIUM mmol/L 139   < > 136   POTASSIUM mmol/L 4.2   < > 4.5   CHLORIDE mmol/L 104   < > 106   CO2 mmol/L 26   < > 24   BUN mg/dL 26*   < > 25   CREATININE mg/dL 0.83   < > 1.11   ANION GAP mmol/L 9   < > 6   CALCIUM mg/dL 9.0   < > 9.2   ALBUMIN g/dL  --   --  4.0   TOTAL BILIRUBIN mg/dL  --   --  1.56*   ALK PHOS U/L  --   --  73   ALT U/L  --   --  28   AST U/L  --   --  28   GLUCOSE RANDOM mg/dL 237*   < > 320*    < > = values in this interval not displayed.     Results from last 7 days   Lab Units 04/27/24  1922   INR  1.08     Results from last 7 days   Lab Units 04/29/24  1039  04/29/24  0753 04/28/24  2047 04/28/24  1703 04/28/24  1109 04/28/24  0709 04/28/24  0028 04/27/24  1908   POC GLUCOSE mg/dl 242* 226* 251* 245* 318* 268* 376* 307*     Results from last 7 days   Lab Units 04/27/24 1922   HEMOGLOBIN A1C % 7.5*     Results from last 7 days   Lab Units 04/29/24  0616 04/27/24 1922   LACTIC ACID mmol/L  --  1.6   PROCALCITONIN ng/ml 0.76* 1.05*       Lines/Drains:  Invasive Devices       Peripheral Intravenous Line  Duration             Peripheral IV 04/27/24 Right Antecubital 1 day              Drain  Duration             External Urinary Catheter Medium 1 day                          Imaging: Reviewed radiology reports from this admission including: abdominal/pelvic CT    Recent Cultures (last 7 days):   Results from last 7 days   Lab Units 04/27/24 1953 04/27/24 1922   BLOOD CULTURE   --  Klebsiella species*  No Growth at 24 hrs.   GRAM STAIN RESULT   --  Gram negative rods*   URINE CULTURE  No Growth <1000 cfu/mL  --        Last 24 Hours Medication List:   Current Facility-Administered Medications   Medication Dose Route Frequency Provider Last Rate    acetaminophen  650 mg Oral Q6H PRN Luz Thayer PA-C      aspirin  81 mg Oral Daily DELILAH [place]      cefepime  2,000 mg Intravenous Q12H Lizeth Stanley MD 2,000 mg (04/29/24 0927)    heparin (porcine)  5,000 Units Subcutaneous Q8H [facility] DELILAH [place]      hydrALAZINE  10 mg Intravenous Q8H PRN Genaro Tian MD      insulin detemir  20 Units Subcutaneous HS Kenny Hawkins MD      insulin lispro  1-5 Units Subcutaneous HS DELILAH [place]      insulin lispro  1-6 Units Subcutaneous TID AC DELILAH [place]      insulin lispro  3 Units Subcutaneous TID With Meals Kenny Hawkins MD      latanoprost  1 drop Both Eyes HS DELILAH [place]      OLANZapine  2.5 mg Oral Once PRN Shaunna Rodriguez MD      pravastatin  40 mg Oral Daily With Dinner DELILAH [place]      sodium chloride  75 mL/hr Intravenous Continuous Kenny Hawkins  MD 75 mL/hr (04/29/24 1231)    tamsulosin  0.4 mg Oral Daily DELILAH [place]          Today, Patient Was Seen By: Kenny Hawkins MD    **Please Note: This note may have been constructed using a voice recognition system.**

## 2024-04-29 NOTE — ASSESSMENT & PLAN NOTE
Lab Results   Component Value Date    HGBA1C 7.5 (H) 04/27/2024   With hyperglycemia likely 2/2 infection  Increase Levemir to 20 units HS and Humalog 3 units TID  Resume metformin on discharge  Accucheck, SSI

## 2024-04-29 NOTE — ASSESSMENT & PLAN NOTE
CT CAP: diffuse bladder wall thickening, pericystic inflammatory change.  UA: 1-2 WBC, occasional bacteria  Urine culture no growth  Ancef

## 2024-04-30 PROBLEM — D75.1 POLYCYTHEMIA: Status: ACTIVE | Noted: 2024-04-30

## 2024-04-30 PROBLEM — D72.829 LEUKOCYTOSIS: Status: ACTIVE | Noted: 2024-04-30

## 2024-04-30 LAB
ANION GAP SERPL CALCULATED.3IONS-SCNC: 9 MMOL/L (ref 4–13)
BACTERIA BLD CULT: ABNORMAL
BASOPHILS # BLD MANUAL: 0 THOUSAND/UL (ref 0–0.1)
BASOPHILS NFR MAR MANUAL: 0 % (ref 0–1)
BUN SERPL-MCNC: 24 MG/DL (ref 5–25)
CALCIUM SERPL-MCNC: 8.7 MG/DL (ref 8.4–10.2)
CHLORIDE SERPL-SCNC: 107 MMOL/L (ref 96–108)
CO2 SERPL-SCNC: 24 MMOL/L (ref 21–32)
CREAT SERPL-MCNC: 0.78 MG/DL (ref 0.6–1.3)
EOSINOPHIL # BLD MANUAL: 0 THOUSAND/UL (ref 0–0.4)
EOSINOPHIL NFR BLD MANUAL: 0 % (ref 0–6)
ERYTHROCYTE [DISTWIDTH] IN BLOOD BY AUTOMATED COUNT: 12.8 % (ref 11.6–15.1)
GFR SERPL CREATININE-BSD FRML MDRD: 81 ML/MIN/1.73SQ M
GLUCOSE SERPL-MCNC: 182 MG/DL (ref 65–140)
GLUCOSE SERPL-MCNC: 183 MG/DL (ref 65–140)
GLUCOSE SERPL-MCNC: 304 MG/DL (ref 65–140)
GLUCOSE SERPL-MCNC: 316 MG/DL (ref 65–140)
GLUCOSE SERPL-MCNC: 356 MG/DL (ref 65–140)
GRAM STN SPEC: ABNORMAL
HCT VFR BLD AUTO: 51.6 % (ref 36.5–49.3)
HGB BLD-MCNC: 18.3 G/DL (ref 12–17)
KLEBSIELLA SP DNA BLD POS QL NAA+NON-PRB: DETECTED
LYMPHOCYTES # BLD AUTO: 1.08 THOUSAND/UL (ref 0.6–4.47)
LYMPHOCYTES # BLD AUTO: 6 % (ref 14–44)
MAGNESIUM SERPL-MCNC: 2 MG/DL (ref 1.9–2.7)
MCH RBC QN AUTO: 32.2 PG (ref 26.8–34.3)
MCHC RBC AUTO-ENTMCNC: 35.5 G/DL (ref 31.4–37.4)
MCV RBC AUTO: 91 FL (ref 82–98)
MONOCYTES # BLD AUTO: 1.44 THOUSAND/UL (ref 0–1.22)
MONOCYTES NFR BLD: 8 % (ref 4–12)
NEUTROPHILS # BLD MANUAL: 15.47 THOUSAND/UL (ref 1.85–7.62)
NEUTS BAND NFR BLD MANUAL: 3 % (ref 0–8)
NEUTS SEG NFR BLD AUTO: 83 % (ref 43–75)
PLATELET # BLD AUTO: 200 THOUSANDS/UL (ref 149–390)
PLATELET BLD QL SMEAR: ADEQUATE
PMV BLD AUTO: 9.8 FL (ref 8.9–12.7)
POTASSIUM SERPL-SCNC: 3.8 MMOL/L (ref 3.5–5.3)
PROCALCITONIN SERPL-MCNC: 0.53 NG/ML
RBC # BLD AUTO: 5.69 MILLION/UL (ref 3.88–5.62)
RBC MORPH BLD: NORMAL
SODIUM SERPL-SCNC: 140 MMOL/L (ref 135–147)
TSH SERPL DL<=0.05 MIU/L-ACNC: 1.52 UIU/ML (ref 0.45–4.5)
WBC # BLD AUTO: 17.99 THOUSAND/UL (ref 4.31–10.16)

## 2024-04-30 PROCEDURE — 97530 THERAPEUTIC ACTIVITIES: CPT

## 2024-04-30 PROCEDURE — 80048 BASIC METABOLIC PNL TOTAL CA: CPT | Performed by: PHARMACIST

## 2024-04-30 PROCEDURE — 97535 SELF CARE MNGMENT TRAINING: CPT

## 2024-04-30 PROCEDURE — 82607 VITAMIN B-12: CPT | Performed by: FAMILY MEDICINE

## 2024-04-30 PROCEDURE — 84145 PROCALCITONIN (PCT): CPT

## 2024-04-30 PROCEDURE — 85007 BL SMEAR W/DIFF WBC COUNT: CPT | Performed by: PHARMACIST

## 2024-04-30 PROCEDURE — 99232 SBSQ HOSP IP/OBS MODERATE 35: CPT | Performed by: HOSPITALIST

## 2024-04-30 PROCEDURE — 82948 REAGENT STRIP/BLOOD GLUCOSE: CPT

## 2024-04-30 PROCEDURE — 99222 1ST HOSP IP/OBS MODERATE 55: CPT | Performed by: FAMILY MEDICINE

## 2024-04-30 PROCEDURE — 83735 ASSAY OF MAGNESIUM: CPT | Performed by: PHARMACIST

## 2024-04-30 PROCEDURE — 85027 COMPLETE CBC AUTOMATED: CPT | Performed by: PHARMACIST

## 2024-04-30 PROCEDURE — 84443 ASSAY THYROID STIM HORMONE: CPT | Performed by: FAMILY MEDICINE

## 2024-04-30 RX ORDER — SODIUM CHLORIDE 9 MG/ML
75 INJECTION, SOLUTION INTRAVENOUS CONTINUOUS
Status: DISCONTINUED | OUTPATIENT
Start: 2024-04-30 | End: 2024-05-02

## 2024-04-30 RX ORDER — CEFAZOLIN SODIUM 2 G/50ML
2000 SOLUTION INTRAVENOUS EVERY 8 HOURS
Status: DISCONTINUED | OUTPATIENT
Start: 2024-04-30 | End: 2024-05-03

## 2024-04-30 RX ADMIN — INSULIN LISPRO 3 UNITS: 100 INJECTION, SOLUTION INTRAVENOUS; SUBCUTANEOUS at 21:39

## 2024-04-30 RX ADMIN — HEPARIN SODIUM 5000 UNITS: 5000 INJECTION INTRAVENOUS; SUBCUTANEOUS at 06:09

## 2024-04-30 RX ADMIN — CEFAZOLIN SODIUM 2000 MG: 2 SOLUTION INTRAVENOUS at 18:30

## 2024-04-30 RX ADMIN — INSULIN LISPRO 3 UNITS: 100 INJECTION, SOLUTION INTRAVENOUS; SUBCUTANEOUS at 08:59

## 2024-04-30 RX ADMIN — SODIUM CHLORIDE 75 ML/HR: 0.9 INJECTION, SOLUTION INTRAVENOUS at 20:02

## 2024-04-30 RX ADMIN — INSULIN LISPRO 5 UNITS: 100 INJECTION, SOLUTION INTRAVENOUS; SUBCUTANEOUS at 11:42

## 2024-04-30 RX ADMIN — INSULIN LISPRO 3 UNITS: 100 INJECTION, SOLUTION INTRAVENOUS; SUBCUTANEOUS at 17:00

## 2024-04-30 RX ADMIN — LATANOPROST 1 DROP: 50 SOLUTION OPHTHALMIC at 21:39

## 2024-04-30 RX ADMIN — CEFAZOLIN SODIUM 2000 MG: 2 SOLUTION INTRAVENOUS at 11:35

## 2024-04-30 RX ADMIN — INSULIN LISPRO 6 UNITS: 100 INJECTION, SOLUTION INTRAVENOUS; SUBCUTANEOUS at 17:00

## 2024-04-30 RX ADMIN — TAMSULOSIN HYDROCHLORIDE 0.4 MG: 0.4 CAPSULE ORAL at 08:57

## 2024-04-30 RX ADMIN — CEFEPIME 2000 MG: 2 INJECTION, POWDER, FOR SOLUTION INTRAVENOUS at 06:06

## 2024-04-30 RX ADMIN — ASPIRIN 81 MG: 81 TABLET, COATED ORAL at 08:57

## 2024-04-30 RX ADMIN — HEPARIN SODIUM 5000 UNITS: 5000 INJECTION INTRAVENOUS; SUBCUTANEOUS at 13:31

## 2024-04-30 RX ADMIN — SODIUM CHLORIDE 75 ML/HR: 0.9 INJECTION, SOLUTION INTRAVENOUS at 11:32

## 2024-04-30 RX ADMIN — INSULIN LISPRO 1 UNITS: 100 INJECTION, SOLUTION INTRAVENOUS; SUBCUTANEOUS at 08:59

## 2024-04-30 RX ADMIN — HEPARIN SODIUM 5000 UNITS: 5000 INJECTION INTRAVENOUS; SUBCUTANEOUS at 21:39

## 2024-04-30 RX ADMIN — ACETAMINOPHEN 650 MG: 325 TABLET, FILM COATED ORAL at 17:04

## 2024-04-30 RX ADMIN — PRAVASTATIN SODIUM 40 MG: 40 TABLET ORAL at 17:02

## 2024-04-30 RX ADMIN — INSULIN DETEMIR 20 UNITS: 100 INJECTION, SOLUTION SUBCUTANEOUS at 21:56

## 2024-04-30 RX ADMIN — INSULIN LISPRO 3 UNITS: 100 INJECTION, SOLUTION INTRAVENOUS; SUBCUTANEOUS at 13:31

## 2024-04-30 NOTE — CONSULTS
Consultation - Geriatric Medicine   Jeffery Ludmila 88 y.o. male MRN: [id number]  Unit/Bed#: S -01 Encounter: [id number]      Assessment/Plan     Polycythemia  Assessment & Plan  Consider further workup  Monitor CBC    Leukocytosis  Assessment & Plan  Slowly trending down most likely in context of bacteremia  Continue to monitor    Bacteremia  Assessment & Plan  Blood culture positive for Klebsiella  Continue antibiotics-currently on Ancef  Manage as per primary team    UTI (urinary tract infection)  Assessment & Plan  CT chest abdomen and pelvis showed diffuse bladder wall thickening-consider further workup with urology  Urine culture no growth  Blood culture positive for Klebsiella, continue antibiotic regimen as per primary team    Ambulatory dysfunction  Assessment & Plan  Patient uses a cane for ambulation at baseline  He does not remember if he had any recent falls  Monitor orthostatic vital signs  Encourage p.o. hydration  Avoid hypotension and hypoglycemia     DM2 (diabetes mellitus, type 2) ([facility])  Assessment & Plan  Lab Results   Component Value Date    HGBA1C 7.5 (H) 04/27/2024       Recent Labs     04/29/24  1608 04/29/24  2057 04/30/24  0718 04/30/24  1142   POCGLU 306* 255* 183* 316*       Blood Sugar Average: Last 72 hrs:  (P) [id number]    Continue to monitor Accu-Cheks, avoid hypoglycemia  Consider increasing Levemir to 30 units daily-Home dose  Check B12 level and TSH  Hold metformin while in the hospital    * Acute metabolic encephalopathy  Assessment & Plan  - Per chart review patient was alert oriented x 4 on admission to the hospital, the time of encounter he was alert oriented x 2  -Patient is high risk of delirium due to bacteremia, hospitalization, cefepime use, possible cognitive impairment at baseline  -delirium precautions  -maintain normal sleep/wake cycle  -minimize overnight interruptions, group overnight vitals/labs/nursing checks as possible  -dim lights, close blinds  and turn off tv to minimize stimulation and encourage sleep environment in evenings  -ensure that pain is well controlled, consider Tylenol 975mg  scheduled   -monitor for fecal and urinary retention which may precipitate delirium  -encourage early mobilization and ambulation  -provide frequent reorientation and redirection  -encourage family and friends at the bedside to help calm patient if anxious  -avoid medications which may precipitate or worsen delirium such as tramadol, benzodiazepine, anticholinergics, and antihistaminics  -encourage hydration and nutrition , assist with feeding if needed  -redirect unwanted behaviors as first line, avoid physical restraints.   -QTc 457, continue to monitor  -Patient did not require any chemical restraint since admission to the hospital  -I suspect cognitive impairment at baseline-consider follow-up with geriatrics as outpatient when patient is medically stable and in a familiar environment.  Check B12 and TSH.  CT head showed chronic microangiopathic changes              History of Present Illness   Physician Requesting Consult: Ganesh Frazier MD  Reason for Consult / Principal Problem: acute encephalopathy        HPI: Jeffery Keys is a 88 y.o. year old male who presented to the hospital with change in mental status.  Apparently patient was screaming for help in his apartment at which point a neighbor called 911.  Patient noted to have a fever on admission to the hospital and urine analysis was consistent with UTI.  Per chart review at the time of admission patient was alert oriented x 4.  Patient was started on antibiotics.  Blood culture positive for Klebsiella.   At the time of encounter patient was alert oriented x 2 denies any acute complaints.  He was slightly frustrated because of ongoing confusion.  Appetite is preserved and he denies difficulty sleeping.  Reports constipation, no fever chills dysuria.  Denies dizziness or lightheadedness.  No chest pain shortness  of breath cough  He reported that he lives alone in his apartment and he is independent with all IADLs including managing his medication and driving.  States that he uses a cane for ambulation and he is not able to tell me if he had any recent falls.    Inpatient consult to Gerontology  Consult performed by: Trinh Bailey MD  Consult ordered by: Lizeth Stanley MD          Review of Systems   Constitutional:  Negative for chills, fatigue and fever.   HENT:  Positive for hearing loss. Negative for congestion, rhinorrhea and sore throat.    Eyes:  Positive for visual disturbance.   Respiratory:  Negative for cough, shortness of breath and wheezing.    Cardiovascular:  Negative for chest pain.   Gastrointestinal:  Positive for constipation. Negative for abdominal pain and nausea.   Genitourinary:  Negative for dysuria and hematuria.   Musculoskeletal:  Positive for gait problem.   Skin:  Negative for rash and wound.   Neurological:  Negative for dizziness and syncope.   Hematological:  Does not bruise/bleed easily.   Psychiatric/Behavioral:  Positive for confusion. Negative for behavioral problems and sleep disturbance.            Historical Information   Past Medical History:   Diagnosis Date    Cardiac disease     Diabetes mellitus (HCC)     Hyperlipidemia     Hypertension     Hyperthyroidism      Past Surgical History:   Procedure Laterality Date    CHOLECYSTECTOMY      HERNIA REPAIR       Social History   Social History     Substance and Sexual Activity   Alcohol Use No     Social History     Substance and Sexual Activity   Drug Use No     Social History     Tobacco Use   Smoking Status Former    Types: Cigarettes   Smokeless Tobacco Never   Tobacco Comments    smoked since age 18 per Rayne     Family History:   Family History   Problem Relation Age of Onset    Heart disease Family        Meds/Allergies   current meds:   Current Facility-Administered Medications   Medication Dose Route Frequency     acetaminophen (TYLENOL) tablet 650 mg  650 mg Oral Q6H PRN    aspirin (ECOTRIN LOW STRENGTH) EC tablet 81 mg  81 mg Oral Daily    ceFAZolin (ANCEF) IVPB (premix in dextrose) 2,000 mg 50 mL  2,000 mg Intravenous Q8H    heparin (porcine) subcutaneous injection 5,000 Units  5,000 Units Subcutaneous Q8H BENY    hydrALAZINE (APRESOLINE) injection 10 mg  10 mg Intravenous Q8H PRN    insulin detemir (LEVEMIR) subcutaneous injection 20 Units  20 Units Subcutaneous HS    insulin lispro (HumALOG/ADMELOG) 100 units/mL subcutaneous injection 1-5 Units  1-5 Units Subcutaneous HS    insulin lispro (HumALOG/ADMELOG) 100 units/mL subcutaneous injection 1-6 Units  1-6 Units Subcutaneous TID AC    insulin lispro (HumALOG/ADMELOG) 100 units/mL subcutaneous injection 3 Units  3 Units Subcutaneous TID With Meals    latanoprost (XALATAN) 0.005 % ophthalmic solution 1 drop  1 drop Both Eyes HS    OLANZapine (ZyPREXA) tablet 2.5 mg  2.5 mg Oral Once PRN    pravastatin (PRAVACHOL) tablet 40 mg  40 mg Oral Daily With Dinner    sodium chloride 0.9 % infusion  75 mL/hr Intravenous Continuous    tamsulosin (FLOMAX) capsule 0.4 mg  0.4 mg Oral Daily     Home medications verified with [facility] in [place]  Crestor 5 mg daily  Levemir 30 units daily  Metformin 850 mg p.o. twice daily  Tamsulosin 0.4 mg daily  Latanoprost eyedrops    No Known Allergies    Objective     Intake/Output Summary (Last 24 hours) at 4/30/2024 1657  Last data filed at 4/30/2024 1300  Gross per 24 hour   Intake 720 ml   Output 1100 ml   Net -380 ml     Invasive Devices       Peripheral Intravenous Line  Duration             Peripheral IV 04/27/24 Right Antecubital 2 days              Drain  Duration             External Urinary Catheter Medium 2 days                    Physical Exam  Vitals and nursing note reviewed.   Constitutional:       General: He is not in acute distress.     Appearance: He is well-developed.      Comments: Frail looking   HENT:      Head:  Normocephalic and atraumatic.      Ears:      Comments: [place]     Mouth/Throat:      Mouth: Mucous membranes are dry.   Eyes:      Conjunctiva/sclera: Conjunctivae normal.   Cardiovascular:      Rate and Rhythm: Normal rate and regular rhythm.      Heart sounds: No murmur heard.  Pulmonary:      Effort: Pulmonary effort is normal. No respiratory distress.      Breath sounds: Normal breath sounds.   Abdominal:      Palpations: Abdomen is soft.      Tenderness: There is no abdominal tenderness.   Genitourinary:     Comments: Peter cath  Musculoskeletal:         General: No swelling.      Cervical back: Neck supple.      Right lower leg: No edema.      Left lower leg: No edema.   Skin:     General: Skin is warm and dry.      Capillary Refill: Capillary refill takes less than 2 seconds.   Neurological:      Mental Status: He is alert. He is disoriented.      Comments: AAOx2, knows the month and the year   Psychiatric:         Mood and Affect: Mood normal.         Lab Results:   I have personally reviewed pertinent lab results including the following:  - CBC CMP    I have personally reviewed the following imaging study reports in PACS:  - CT head, chest , abdomen and pelvis      Therapies:   PT: Patient will benefit of postacute rehab on discharge    VTE Prophylaxis: Heparin    Code Status: Level 1 - Full Code  Advance Directive and Living Will:      Power of :    POLST:      Family and Social Support:   Living Arrangements: Lives Alone  Support Systems: Self; Friends/neighbors  Type of Current Residence: Apartment  Discharge planning discussed with:: patient at bedside; call made to emergency contactCindy and message left with Al requesting return call  Freedom of Choice: Yes (re: rehab)        Thank you for allowing me to participate in your patients' care. Please do not hesitate to call with any additional questions.  Trinh Bailey MD

## 2024-04-30 NOTE — ASSESSMENT & PLAN NOTE
Lab Results   Component Value Date    HGBA1C 7.5 (H) 04/27/2024       Recent Labs     05/01/24  1645 05/01/24  2135 05/02/24  0806 05/02/24  1146   POCGLU 304* 283* 245* 320*       Blood Sugar Average: Last 72 hrs:  (P) [id number]    Continue to monitor Accu-Cheks, avoid hypoglycemia  Consider increasing Levemir to 30 units daily-Home dose  Hold metformin while in the hospital

## 2024-04-30 NOTE — ASSESSMENT & PLAN NOTE
Patient uses a cane for ambulation at baseline  He reports falls in the past year  Monitor orthostatic vital signs  Encourage p.o. hydration  Avoid hypotension and hypoglycemia   Consider telemetry monitoring while in the hospital

## 2024-04-30 NOTE — PROGRESS NOTES
"[facility]  Progress Note  Name: Jeffery Keys I  MRN: [id number]  Unit/Bed#: S -01 I Date of Admission: 4/27/2024   Date of Service: 4/30/2024 I Hospital Day: 3    Assessment/Plan   * Acute metabolic encephalopathy  Assessment & Plan  Urine culture negative, blood cx x1 showing Klebsiella    Procal trending down  AAO x2 today, improving    Plan  Narrow to Ancef based on blood cx susceptibilities  Delirium precautions  Urinary retention protocol  Continuous IVF until PO intake improves - dry on exam    Abnormality of soft tissue on posterior neck  Assessment & Plan  Patient with approximately 8x6 cm soft tissue lump on right posterior neck with overlying 1.5 cm round cystic nodule. Per patient and friend Cindy, he has had this \"lump\" on his neck for a long time. Unclear if referring to cyst or larger mass below    CT neck:   No neck abscess, as clinically questioned.  7.0 cm subcutaneous lipoma in right posterior paramidline neck. Given internal wispy fat heterogeneity, atypical lipomatous tumor in the differential.  1.3 cm subcutaneous lesion in right posterior upper neck laterally adjacent to the fatty lesion, likely sebaceous/epidermal inclusion cyst.  No suspicious cervical lymphadenopathy.    Plan  Outpatient follow-up general surgery/ENT    Bacteremia  Assessment & Plan  Blood culture x1 showing GNR, Klebsiella    Switch to Ancef based on susceptibility    UTI (urinary tract infection)  Assessment & Plan  CT CAP: diffuse bladder wall thickening, pericystic inflammatory change.  UA: 1-2 WBC, occasional bacteria  Urine culture no growth  Ancef    HTN (hypertension), benign  Assessment & Plan  Prn hydralazine for SBP >180    DM2 (diabetes mellitus, type 2) ([facility])  Assessment & Plan  Lab Results   Component Value Date    HGBA1C 7.5 (H) 04/27/2024   With hyperglycemia likely 2/2 infection  Increase Levemir to 20 units HS and Humalog 3 units TID  Resume metformin on " discharge  Accucheck, SSI    Sepsis (HCC)  Assessment & Plan  Plan  Ancef based on susceptibilities             VTE Pharmacologic Prophylaxis: VTE Score: 5 High Risk (Score >/= 5) - Pharmacological DVT Prophylaxis Ordered: heparin. Sequential Compression Devices Ordered.    Mobility:   Basic Mobility Inpatient Raw Score: 6  JH-HLM Goal: 2: Bed activities/Dependent transfer  JH-HLM Achieved: 4: Move to chair/commode  JH-HLM Goal achieved. Continue to encourage appropriate mobility.    Patient Centered Rounds: I performed bedside rounds with nursing staff today.  Discussions with Specialists or Other Care Team Provider: None    Education and Discussions with Family / Patient:  Will call.     Current Length of Stay: 3 day(s)  Current Patient Status: Inpatient   Discharge Plan: Anticipate discharge in 24-48 hrs to rehab facility.    Code Status: Level 1 - Full Code    Subjective:   NAEON from nursing perspective. Pt AAO to self and place, improvement from yesterday. ROS could not be obtained due to waxing/waning mentation. Urine normal appearing/yellow in bag. Patient with clammy skin on exam.    Objective:     Vitals:   Temp (24hrs), Av °F (37.2 °C), Min:98.1 °F (36.7 °C), Max:99.6 °F (37.6 °C)    Temp:  [98.1 °F (36.7 °C)-99.6 °F (37.6 °C)] 98.1 °F (36.7 °C)  HR:  [] 107  Resp:  [20] 20  BP: (151-172)/(92-95) 155/95  SpO2:  [97 %-98 %] 97 %  Body mass index is 27.19 kg/m².     Input and Output Summary (last 24 hours):     Intake/Output Summary (Last 24 hours) at 2024 1207  Last data filed at 2024 2206  Gross per 24 hour   Intake 220 ml   Output 750 ml   Net -530 ml       Physical Exam:   Physical Exam  Constitutional:       General: He is not in acute distress.     Appearance: He is ill-appearing and diaphoretic.   HENT:      Mouth/Throat:      Mouth: Mucous membranes are dry.      Pharynx: No oropharyngeal exudate.   Eyes:      General: No scleral icterus.     Conjunctiva/sclera: Conjunctivae  normal.   Cardiovascular:      Rate and Rhythm: Regular rhythm. Tachycardia present.      Pulses: Normal pulses.   Pulmonary:      Effort: No respiratory distress.   Abdominal:      General: There is no distension.      Tenderness: There is no abdominal tenderness. There is no guarding.   Musculoskeletal:      Cervical back: Neck supple. No tenderness.      Right lower leg: No edema.      Left lower leg: No edema.   Lymphadenopathy:      Cervical: No cervical adenopathy.   Skin:     General: Skin is warm.      Coloration: Skin is not jaundiced.      Findings: No rash.      Comments: Clammy/sweaty   Neurological:      Mental Status: He is disoriented.          Additional Data:     Labs:  Results from last 7 days   Lab Units 04/30/24  0622 04/29/24  0616   WBC Thousand/uL 17.99* 18.84*   HEMOGLOBIN g/dL 18.3* 16.5   HEMATOCRIT % 51.6* 46.5   PLATELETS Thousands/uL 200 189   BANDS PCT % 3  --    SEGS PCT %  --  89*   LYMPHO PCT % 6* 4*   MONO PCT % 8 7   EOS PCT % 0 0     Results from last 7 days   Lab Units 04/30/24  0622 04/28/24  0545 04/27/24  1922   SODIUM mmol/L 140   < > 136   POTASSIUM mmol/L 3.8   < > 4.5   CHLORIDE mmol/L 107   < > 106   CO2 mmol/L 24   < > 24   BUN mg/dL 24   < > 25   CREATININE mg/dL 0.78   < > 1.11   ANION GAP mmol/L 9   < > 6   CALCIUM mg/dL 8.7   < > 9.2   ALBUMIN g/dL  --   --  4.0   TOTAL BILIRUBIN mg/dL  --   --  1.56*   ALK PHOS U/L  --   --  73   ALT U/L  --   --  28   AST U/L  --   --  28   GLUCOSE RANDOM mg/dL 182*   < > 320*    < > = values in this interval not displayed.     Results from last 7 days   Lab Units 04/27/24  1922   INR  1.08     Results from last 7 days   Lab Units 04/30/24  1142 04/30/24  0718 04/29/24  2057 04/29/24  1608 04/29/24  1039 04/29/24  0753 04/28/24  2047 04/28/24  1703 04/28/24  1109 04/28/24  0709 04/28/24  0028 04/27/24  1908   POC GLUCOSE mg/dl 316* 183* 255* 306* 242* 226* 251* 245* 318* 268* 376* 307*     Results from last 7 days   Lab Units  04/27/24 1922   HEMOGLOBIN A1C % 7.5*     Results from last 7 days   Lab Units 04/30/24  0622 04/29/24  0616 04/27/24 1922   LACTIC ACID mmol/L  --   --  1.6   PROCALCITONIN ng/ml 0.53* 0.76* 1.05*       Lines/Drains:  Invasive Devices       Peripheral Intravenous Line  Duration             Peripheral IV 04/27/24 Right Antecubital 2 days              Drain  Duration             External Urinary Catheter Medium 2 days                          Imaging: Reviewed radiology reports from this admission including: CT neck    Recent Cultures (last 7 days):   Results from last 7 days   Lab Units 04/27/24 1953 04/27/24 1922   BLOOD CULTURE   --  Klebsiella pneumoniae*  No Growth at 48 hrs.   GRAM STAIN RESULT   --  Gram negative rods*   URINE CULTURE  No Growth <1000 cfu/mL  --        Last 24 Hours Medication List:   Current Facility-Administered Medications   Medication Dose Route Frequency Provider Last Rate    acetaminophen  650 mg Oral Q6H PRN Luz Thayer PA-C      aspirin  81 mg Oral Daily DELILAH [place]      cefazolin  2,000 mg Intravenous Q8H Kenny Hawkins MD 2,000 mg (04/30/24 1135)    heparin (porcine)  5,000 Units Subcutaneous Q8H BENY DELILAH [place]      hydrALAZINE  10 mg Intravenous Q8H PRN Genaro Tian MD      insulin detemir  20 Units Subcutaneous HS Kenny Hawkins MD      insulin lispro  1-5 Units Subcutaneous HS DELILAH [place]      insulin lispro  1-6 Units Subcutaneous TID AC DELILAH [place]      insulin lispro  3 Units Subcutaneous TID With Meals Kenny Hawkins MD      latanoprost  1 drop Both Eyes HS DELILAH [place]      OLANZapine  2.5 mg Oral Once PRN Shaunna Rodriguez MD      pravastatin  40 mg Oral Daily With Dinner DELILAH [place]      sodium chloride  75 mL/hr Intravenous Continuous Kenny Hawkins MD 75 mL/hr (04/30/24 1132)    tamsulosin  0.4 mg Oral Daily DELILAH [place]          Today, Patient Was Seen By: Kenny Hawkins MD    **Please Note: This note may have been constructed using a voice  recognition system.**

## 2024-04-30 NOTE — OCCUPATIONAL THERAPY NOTE
Occupational Therapy Progress Note     Patient Name: Jeffery Keys  Today's Date: 4/30/2024  Problem List  Principal Problem:    Acute metabolic encephalopathy  Active Problems:    Sepsis (HCC)    DM2 (diabetes mellitus, type 2) (HCC)    HTN (hypertension), benign    UTI (urinary tract infection)    Bacteremia    Abnormality of soft tissue on posterior neck          04/30/24 1051   OT Last Visit   OT Visit Date 04/30/24   Note Type   Note Type Treatment   Pain Assessment   Pain Assessment Tool 0-10   Pain Score No Pain   Restrictions/Precautions   Weight Bearing Precautions Per Order No   Other Precautions Cognitive;Chair Alarm;Bed Alarm;Fall Risk;Multiple lines   ADL   Where Assessed Chair   Grooming Assistance 4  Minimal Assistance   Grooming Deficit Setup;Steadying;Verbal cueing;Increased time to complete;Supervision/safety;Wash/dry hands;Wash/dry face;Teeth care   Grooming Comments cues and physical assist to initiate tasks, pt shook head yes to wanting to brush teeth, therapist had to position toothbrush to patient's mouth and place in patient's hand and he began brushing his teeth, assist to position wash cloth in hand and bring to face   UB Bathing Assistance 4  Minimal Assistance   UB Bathing Deficit Setup;Steadying;Supervision/safety;Verbal cueing;Increased time to complete   LB Bathing Assistance 3  Moderate Assistance   LB Bathing Deficit Setup;Steadying;Supervision/safety;Verbal cueing;Increased time to complete   UB Dressing Assistance 4  Minimal Assistance   UB Dressing Deficit Setup;Steadying;Supervision/safety;Increased time to complete   LB Dressing Assistance 2  Maximal Assistance   LB Dressing Deficit Don/doff L sock;Don/doff R sock;Steadying;Setup;Requires assistive device for steadying;Verbal cueing;Supervision/safety;Increased time to complete   Toileting Assistance  4  Minimal Assistance   Toileting Deficit Setup;Steadying;Supervison/safety;Verbal cueing;Increased time to complete;Clothing  management up;Clothing management down;Perineal hygiene;Grab bar use   Toileting Comments completing hygiene while seated, min assist to insure cleanliness   Functional Standing Tolerance   Time 2minutes   Activity min assist steadying support at sink to wash hands   Comments W/ RW and steadying support   Bed Mobility   Sit to Supine 3  Moderate assistance   Additional items Assist x 1;Increased time required;Verbal cues;LE management;Bedrails   Additional Comments increased time to complete   Transfers   Sit to Stand 3  Moderate assistance   Additional items Assist x 1;Increased time required;Verbal cues;Armrests   Stand to Sit 4  Minimal assistance   Additional items Assist x 1;Increased time required;Verbal cues;Armrests   Toilet transfer 3  Moderate assistance   Additional items Assist x 1;Increased time required;Verbal cues;Standard toilet  (grab bar use)   Additional Comments cues for hand placement and positioning, pt incontinent of bowel upon stance   Functional Mobility   Functional Mobility 3  Moderate assistance   Additional Comments assist x1 w/ increased time to complete and cues to sequencing and safety w/ RW   Additional items Rolling walker   Therapeutic Exercise - ROM   UE-ROM Yes   ROM- Right Upper Extremities   R Shoulder AROM;Flexion;Extension   R Elbow AROM;Elbow flexion;Elbow extension   R Weight/Reps/Sets 2 sets x 10reps   RUE ROM Comment initially requiring assist to initiate movements   ROM - Left Upper Extremities    L Shoulder AROM;Flexion;Extension   L Elbow AROM;Elbow flexion;Elbow extension   L Weight/Reps/Sets 2 sets x 10reps   LUE ROM Comment initially requiring assist to initiate movements   Cognition   Overall Cognitive Status Impaired   Arousal/Participation Responsive;Cooperative;Persistent stimuli required   Attention Attends with cues to redirect   Orientation Level Oriented to place;Oriented to person;Oriented to time;Disoriented to situation   Memory Decreased recall of  precautions;Decreased recall of recent events;Decreased short term memory   Following Commands Follows one step commands with increased time or repetition   Comments pt level of alertness waxes and wanes at times answering questions appropriately and then other times blank stares and limited responses, flat affect, increased processing time, difficulty initiating tasks and motor planning   Additional Activities   Additional Activities   (education on positioning and safety)   Additional Activities Comments pt w/ slight rigidity w/ movements   Activity Tolerance   Activity Tolerance Patient limited by fatigue;Treatment limited secondary to agitation;Treatment limited secondary to medical complications (Comment)   Medical Staff Made Aware appropriate to see per Kevin MONROY   Assessment   Assessment Pt seen for skilled OT session focused on ADLs, functional transfers and mobility, UE exercises. Pt w/ improvement in functional performance since initial eval. However pt level  of alertness continues to wax and wane during session w/ periods of appropriately answering questions and then pt w/ blank stares requiring physical and verbal cues to initiate task. Pt seated in recliner and required physical assist to initiate UE exercises, slight rigidity noted. Pt w/ min assist grooming while seated: cues and physical assist to initiate tasks, pt shook head yes to wanting to brush teeth, therapist had to position toothbrush to patient's mouth and place in patient's hand and he began brushing his teeth, assist to position wash cloth in hand and bring to face. Pt w/ perseveration w/ teeth brushing. Pt w/ MIN assist UB Bathing and Dressing w/ increased time to complete and cues to initiate. Pt w/ MOD assist LB Bathing and pt w/ MAX assist LB Dressing. Pt w/ MOD assist sit>stand from recliner and pt w/ slight incontinence of bowel and pt w/ MOd assist x1 functional mobility w/ RW to bathroom and mod assist toilet transfer w/ cues for  grab bar use. Pt w/ MIN assist toileting hygiene while seated and assist to ensure cleanliness. Pt w/ MOD assist sit>Stand from toilet w/ grab bar use and min assist steadying at sink to wash hands. Pt w/ MOD assist functional mobility w/ RW to EOB. Pt w/ MOD  assist supine>sit bed mobility w/ increased time to complete. Pt seated upright in bed at end of session. Pt continues to be limited due to impaired cognition, flat affect, impaired functional reach, decreased strength and endurance, increased time coordination tasks, decreased initiation of tasks w/ impaired motor planning, impaired insight and safety awareness, impaired problem solving all causing a decline in aDLs, functional transfers and mobility. Recommend level II moderate resources. Pt would benefit from a geriatric consult.   The patient's raw score on the AM-PAC Daily Activity Inpatient Short Form is 14. A raw score of less than 19 suggests the patient may benefit from discharge to post-acute rehabilitation services. Please refer to the recommendation of the Occupational Therapist for safe discharge planning.   Plan   Treatment Interventions ADL retraining;UE strengthening/ROM;Functional transfer training;Cognitive reorientation;Endurance training;Patient/family training;Equipment evaluation/education;Compensatory technique education;Energy conservation;Activityengagement   Goal Expiration Date 05/13/24   OT Treatment Day 2   OT Frequency 3-5x/wk   Discharge Recommendation   Recommendation Geriatric Consult  (contacted MD)   Rehab Resource Intensity Level, OT II (Moderate Resource Intensity)   AM-PAC Daily Activity Inpatient   Lower Body Dressing 1   Bathing 2   Toileting 2   Upper Body Dressing 3   Grooming 3   Eating 3   Daily Activity Raw Score 14   Daily Activity Standardized Score (Calc for Raw Score >=11) 33.39   AM-PAC Applied Cognition Inpatient   Following a Speech/Presentation 2   Understanding Ordinary Conversation 3   Taking Medications 1    Remembering Where Things Are Placed or Put Away 2   Remembering List of 4-5 Errands 2   Taking Care of Complicated Tasks 2   Applied Cognition Raw Score 12   Applied Cognition Standardized Score 28.82   Modified [place] Scale   Modified [place] Scale 4   End of Consult   Education Provided Yes   Patient Position at End of Consult Bed/Chair alarm activated;All needs within reach;Supine   Nurse Communication Nurse aware of consult     Documentation completed by: Gunjan Wilkinson MS, OTR/L

## 2024-04-30 NOTE — ASSESSMENT & PLAN NOTE
Blood culture positive for Klebsiella  Continue antibiotics-currently on Ancef  Manage as per primary team

## 2024-04-30 NOTE — PLAN OF CARE
Problem: OCCUPATIONAL THERAPY ADULT  Goal: Performs self-care activities at highest level of function for planned discharge setting.  See evaluation for individualized goals.  Description: Treatment Interventions: ADL retraining, Functional transfer training, UE strengthening/ROM, Endurance training, Cognitive reorientation, Equipment evaluation/education, Patient/family training, Compensatory technique education, Energy conservation, Activityengagement          See flowsheet documentation for full assessment, interventions and recommendations.   Outcome: Progressing  Note: Limitation: Decreased ADL status, Decreased Safe judgement during ADL, Decreased UE strength, Decreased cognition, Decreased endurance, Decreased self-care trans, Decreased high-level ADLs, Decreased sensation  Prognosis: Good  Assessment: Pt seen for skilled OT session focused on ADLs, functional transfers and mobility, UE exercises. Pt w/ improvement in functional performance since initial eval. However pt level  of alertness continues to wax and wane during session w/ periods of appropriately answering questions and then pt w/ blank stares requiring physical and verbal cues to initiate task. Pt seated in recliner and required physical assist to initiate UE exercises, slight rigidity noted. Pt w/ min assist grooming while seated: cues and physical assist to initiate tasks, pt shook head yes to wanting to brush teeth, therapist had to position toothbrush to patient's mouth and place in patient's hand and he began brushing his teeth, assist to position wash cloth in hand and bring to face. Pt w/ perseveration w/ teeth brushing. Pt w/ MIN assist UB Bathing and Dressing w/ increased time to complete and cues to initiate. Pt w/ MOD assist LB Bathing and pt w/ MAX assist LB Dressing. Pt w/ MOD assist sit>stand from recliner and pt w/ slight incontinence of bowel and pt w/ MOd assist x1 functional mobility w/ RW to bathroom and mod assist toilet  transfer w/ cues for grab bar use. Pt w/ MIN assist toileting hygiene while seated and assist to ensure cleanliness. Pt w/ MOD assist sit>Stand from toilet w/ grab bar use and min assist steadying at sink to wash hands. Pt w/ MOD assist functional mobility w/ RW to EOB. Pt w/ MOD  assist supine>sit bed mobility w/ increased time to complete. Pt seated upright in bed at end of session. Pt continues to be limited due to impaired cognition, flat affect, impaired functional reach, decreased strength and endurance, increased time coordination tasks, decreased initiation of tasks w/ impaired motor planning, impaired insight and safety awareness, impaired problem solving all causing a decline in aDLs, functional transfers and mobility. Recommend level II moderate resources. Pt would benefit from a geriatric consult.   The patient's raw score on the AM-PAC Daily Activity Inpatient Short Form is 14. A raw score of less than 19 suggests the patient may benefit from discharge to post-acute rehabilitation services. Please refer to the recommendation of the Occupational Therapist for safe discharge planning.  Recommendation: Geriatric Consult (contacted MD)  Rehab Resource Intensity Level, OT: II (Moderate Resource Intensity)

## 2024-04-30 NOTE — CASE MANAGEMENT
Case Management Progress Note    Patient name Jeffery Mendoza S /S -01 MRN [id number]  : 1935 Date 2024       LOS (days): 3  Geometric Mean LOS (GMLOS) (days):   Days to GMLOS:        OBJECTIVE:        Current admission status: Inpatient  Preferred Pharmacy:   [facility]/pharmacy [facility][id number] [place] [facility] [address] [facility] [address]  Phone: [phone] Fax: [phone]    Primary Care Provider: DELILAH Mcpherson    Primary Insurance: MEDICARE  Secondary Insurance: AARP    PROGRESS NOTE:    Call received from patient's emergency contact, Cindy, stating that he now has minutes on his phone and can be contacted directly (do not have to go through his friend, Al). Requested his contact number be changed in the chart to: [phone]. Chart updated to reflect same.     Cindy aware that [facility] had been able to offer a rehab bed for patient for when he's medically stable for discharge - in agreement for that plan. Will try to be in today/tomorrow to bring clothes/belongings to patient.

## 2024-04-30 NOTE — QUICK NOTE
Spoke with friend Cindy at bedside. Cindy mentioned that he cooks/brings Jeffery at least one meal each day and makes sure to do welfare checks frequently. Per Cindy, Jeffery has no spouse/girlfriend or children who are in direct or consistent contact with him. According to CM conversation, Jeffery may have a sister he keeps in touch with, but phone number is unknown. I told Cindy that he would be next in line as medical decision maker in the case that no other next of kin could be found/contacted. He expressed understanding and will bring in a document that Jeffery has at home stating DNR status. Will follow-up and adjust code status accordingly.

## 2024-04-30 NOTE — PHYSICAL THERAPY NOTE
"                                     Physical Therapy Cancellation Note:    Inc lethargy and drowsiness during introduction of therapist into the pts room. Pt had difficulty keeping B eyes open, despite extensive verbal and physical stimulation given. When therapist continuously asked pt to move legs to edge of bed, pt continued to shake head in \"no\" motion. Difficulty keeping pt awake. Cancel PT services for today due to pts refusal as well as lethargy and drowsiness. Will cont to follow as able.                                                                                        "

## 2024-04-30 NOTE — CASE MANAGEMENT
Case Management Progress Note    Patient name Jeffery Mendoza S /S -01 MRN [id number]  : 1935 Date 2024       LOS (days): 3  Geometric Mean LOS (GMLOS) (days): 5.1  Days to GMLOS:2.4        OBJECTIVE:        Current admission status: Inpatient  Preferred Pharmacy:   [facility]/pharmacy #[id number] - [place] [facility] [address] [address] [facility] [address]  Phone: [phone] Fax: [phone]    Primary Care Provider: DELILAH Mcpherson    Primary Insurance: MEDICARE  Secondary Insurance: AARP    PROGRESS NOTE:    Met with patient and friend, Cindy, at bedside. Friend relayed concern as patient grabbing side rails and chewing on chapstick container. Cindy also relaying that patient has a DNR order - asking if he should bring it in. Encouraged him to bring in paperwork re: code status and any advanced directives/POA that he may have. Also discussed importance of having family contact information on file. Per Cindy, patient has a sister he still keeps in touch with, but has a strained relationship with his children. Unsure if he will be able to locate contact numbers for family.     Patient currently listed as full code, so TT sent to MDs to request conversation with patient/friend re: code status and to provide friend with medical update. Confirmed he will stop by room.

## 2024-04-30 NOTE — ASSESSMENT & PLAN NOTE
- Per chart review patient was alert oriented x 4 on admission to the hospital, the time of encounter he was alert oriented x 3, patient not able to remember why and how he got to the hospital  -Had difficulty reciting days of the week backwards, but was able to do it with cues  -Patient is high risk of delirium due to bacteremia, hospitalization, cefepime use, possible cognitive impairment at baseline  -delirium precautions  -maintain normal sleep/wake cycle  -minimize overnight interruptions, group overnight vitals/labs/nursing checks as possible  -dim lights, close blinds and turn off tv to minimize stimulation and encourage sleep environment in evenings  -ensure that pain is well controlled, consider Tylenol 975mg  scheduled   -monitor for fecal and urinary retention which may precipitate delirium  -encourage early mobilization and ambulation  -provide frequent reorientation and redirection  -encourage family and friends at the bedside to help calm patient if anxious  -avoid medications which may precipitate or worsen delirium such as tramadol, benzodiazepine, anticholinergics, and antihistaminics  -encourage hydration and nutrition , assist with feeding if needed  -redirect unwanted behaviors as first line, avoid physical restraints.   -QTc 457, continue to monitor  -Patient did not require any chemical restraint since admission to the hospital  -I suspect cognitive impairment at baseline-consider follow-up with geriatrics as outpatient when patient is medically stable and in a familiar environment.    CT head showed chronic microangiopathic changes

## 2024-05-01 PROBLEM — R41.89 COGNITIVE IMPAIRMENT: Status: ACTIVE | Noted: 2024-05-01

## 2024-05-01 LAB
BASOPHILS # BLD AUTO: 0.04 THOUSANDS/ÂΜL (ref 0–0.1)
BASOPHILS NFR BLD AUTO: 0 % (ref 0–1)
EOSINOPHIL # BLD AUTO: 0 THOUSAND/ÂΜL (ref 0–0.61)
EOSINOPHIL NFR BLD AUTO: 0 % (ref 0–6)
ERYTHROCYTE [DISTWIDTH] IN BLOOD BY AUTOMATED COUNT: 12.7 % (ref 11.6–15.1)
GLUCOSE SERPL-MCNC: 245 MG/DL (ref 65–140)
GLUCOSE SERPL-MCNC: 271 MG/DL (ref 65–140)
GLUCOSE SERPL-MCNC: 283 MG/DL (ref 65–140)
GLUCOSE SERPL-MCNC: 304 MG/DL (ref 65–140)
HCT VFR BLD AUTO: 49.3 % (ref 36.5–49.3)
HGB BLD-MCNC: 17.7 G/DL (ref 12–17)
IMM GRANULOCYTES # BLD AUTO: 0.11 THOUSAND/UL (ref 0–0.2)
IMM GRANULOCYTES NFR BLD AUTO: 1 % (ref 0–2)
LYMPHOCYTES # BLD AUTO: 1.03 THOUSANDS/ÂΜL (ref 0.6–4.47)
LYMPHOCYTES NFR BLD AUTO: 7 % (ref 14–44)
MAGNESIUM SERPL-MCNC: 2 MG/DL (ref 1.9–2.7)
MCH RBC QN AUTO: 31.8 PG (ref 26.8–34.3)
MCHC RBC AUTO-ENTMCNC: 35.9 G/DL (ref 31.4–37.4)
MCV RBC AUTO: 89 FL (ref 82–98)
MONOCYTES # BLD AUTO: 1.26 THOUSAND/ÂΜL (ref 0.17–1.22)
MONOCYTES NFR BLD AUTO: 9 % (ref 4–12)
NEUTROPHILS # BLD AUTO: 12.17 THOUSANDS/ÂΜL (ref 1.85–7.62)
NEUTS SEG NFR BLD AUTO: 83 % (ref 43–75)
NRBC BLD AUTO-RTO: 0 /100 WBCS
PLATELET # BLD AUTO: 209 THOUSANDS/UL (ref 149–390)
PMV BLD AUTO: 10.4 FL (ref 8.9–12.7)
PROCALCITONIN SERPL-MCNC: 0.35 NG/ML
RBC # BLD AUTO: 5.57 MILLION/UL (ref 3.88–5.62)
VIT B12 SERPL-MCNC: 358 PG/ML (ref 180–914)
WBC # BLD AUTO: 14.61 THOUSAND/UL (ref 4.31–10.16)

## 2024-05-01 PROCEDURE — 82948 REAGENT STRIP/BLOOD GLUCOSE: CPT

## 2024-05-01 PROCEDURE — 85025 COMPLETE CBC W/AUTO DIFF WBC: CPT | Performed by: PHARMACIST

## 2024-05-01 PROCEDURE — 80053 COMPREHEN METABOLIC PANEL: CPT | Performed by: PHARMACIST

## 2024-05-01 PROCEDURE — 99232 SBSQ HOSP IP/OBS MODERATE 35: CPT | Performed by: FAMILY MEDICINE

## 2024-05-01 PROCEDURE — 97530 THERAPEUTIC ACTIVITIES: CPT

## 2024-05-01 PROCEDURE — 99232 SBSQ HOSP IP/OBS MODERATE 35: CPT | Performed by: HOSPITALIST

## 2024-05-01 PROCEDURE — 84145 PROCALCITONIN (PCT): CPT | Performed by: PHARMACIST

## 2024-05-01 PROCEDURE — 83735 ASSAY OF MAGNESIUM: CPT | Performed by: PHARMACIST

## 2024-05-01 PROCEDURE — 97110 THERAPEUTIC EXERCISES: CPT

## 2024-05-01 RX ORDER — INSULIN LISPRO 100 [IU]/ML
1-5 INJECTION, SOLUTION INTRAVENOUS; SUBCUTANEOUS
Status: DISCONTINUED | OUTPATIENT
Start: 2024-05-01 | End: 2024-05-02

## 2024-05-01 RX ORDER — INSULIN LISPRO 100 [IU]/ML
5 INJECTION, SOLUTION INTRAVENOUS; SUBCUTANEOUS
Status: DISCONTINUED | OUTPATIENT
Start: 2024-05-01 | End: 2024-05-02

## 2024-05-01 RX ORDER — ACETAMINOPHEN 325 MG/1
975 TABLET ORAL EVERY 8 HOURS PRN
Status: DISCONTINUED | OUTPATIENT
Start: 2024-05-01 | End: 2024-05-03 | Stop reason: HOSPADM

## 2024-05-01 RX ADMIN — ASPIRIN 81 MG: 81 TABLET, COATED ORAL at 09:22

## 2024-05-01 RX ADMIN — INSULIN DETEMIR 25 UNITS: 100 INJECTION, SOLUTION SUBCUTANEOUS at 22:29

## 2024-05-01 RX ADMIN — CEFAZOLIN SODIUM 2000 MG: 2 SOLUTION INTRAVENOUS at 02:35

## 2024-05-01 RX ADMIN — INSULIN LISPRO 3 UNITS: 100 INJECTION, SOLUTION INTRAVENOUS; SUBCUTANEOUS at 22:30

## 2024-05-01 RX ADMIN — INSULIN LISPRO 5 UNITS: 100 INJECTION, SOLUTION INTRAVENOUS; SUBCUTANEOUS at 13:02

## 2024-05-01 RX ADMIN — CEFAZOLIN SODIUM 2000 MG: 2 SOLUTION INTRAVENOUS at 17:18

## 2024-05-01 RX ADMIN — INSULIN LISPRO 5 UNITS: 100 INJECTION, SOLUTION INTRAVENOUS; SUBCUTANEOUS at 17:16

## 2024-05-01 RX ADMIN — LATANOPROST 1 DROP: 50 SOLUTION OPHTHALMIC at 22:31

## 2024-05-01 RX ADMIN — HEPARIN SODIUM 5000 UNITS: 5000 INJECTION INTRAVENOUS; SUBCUTANEOUS at 13:01

## 2024-05-01 RX ADMIN — INSULIN LISPRO 3 UNITS: 100 INJECTION, SOLUTION INTRAVENOUS; SUBCUTANEOUS at 17:16

## 2024-05-01 RX ADMIN — SODIUM CHLORIDE 75 ML/HR: 0.9 INJECTION, SOLUTION INTRAVENOUS at 17:15

## 2024-05-01 RX ADMIN — TAMSULOSIN HYDROCHLORIDE 0.4 MG: 0.4 CAPSULE ORAL at 09:22

## 2024-05-01 RX ADMIN — HEPARIN SODIUM 5000 UNITS: 5000 INJECTION INTRAVENOUS; SUBCUTANEOUS at 05:44

## 2024-05-01 RX ADMIN — PRAVASTATIN SODIUM 40 MG: 40 TABLET ORAL at 17:15

## 2024-05-01 RX ADMIN — CEFAZOLIN SODIUM 2000 MG: 2 SOLUTION INTRAVENOUS at 10:14

## 2024-05-01 RX ADMIN — INSULIN LISPRO 3 UNITS: 100 INJECTION, SOLUTION INTRAVENOUS; SUBCUTANEOUS at 13:01

## 2024-05-01 RX ADMIN — HEPARIN SODIUM 5000 UNITS: 5000 INJECTION INTRAVENOUS; SUBCUTANEOUS at 22:28

## 2024-05-01 RX ADMIN — INSULIN LISPRO 5 UNITS: 100 INJECTION, SOLUTION INTRAVENOUS; SUBCUTANEOUS at 09:00

## 2024-05-01 RX ADMIN — ACETAMINOPHEN 975 MG: 325 TABLET, FILM COATED ORAL at 15:04

## 2024-05-01 NOTE — ASSESSMENT & PLAN NOTE
CT CAP: diffuse bladder wall thickening, pericystic inflammatory change.  UA: 1-2 WBC, occasional bacteria  Urine culture no growth  Continue Ancef
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
## 2024-05-01 NOTE — ASSESSMENT & PLAN NOTE
Procal and WBC trending down  Improved mentation today, able to have conversation. States he does not recall events from yesterday and his friend visiting. Was able to recall that he woke up to urinate and had a BM  Improved PO intake, ate about 30% of dinner last night per RN    Plan  Continue Ancef  Delirium precautions  Urinary retention protocol  Continuous IVF until PO intake improves

## 2024-05-01 NOTE — CASE MANAGEMENT
Case Management Progress Note    Patient name Jeffery Mendoza S /S -01 MRN [id number]  : 1935 Date 2024       LOS (days): 4  Geometric Mean LOS (GMLOS) (days): 5.1  Days to GMLOS:1.5        OBJECTIVE:        Current admission status: Inpatient  Preferred Pharmacy:   [id number] [place]  [address] [place] [address]  Phone: [phone] Fax: [phone]    Primary Care Provider: DELILAH Mcpherson    Primary Insurance: MEDICARE  Secondary Insurance: A[employer]    PROGRESS NOTE:    Weekly Care Management Length of Stay Review     Current LOS: 4 Days    Most Recent Labs:     Lab Results   Component Value Date/Time    WBC 14.61 (H) 2024 04:38 AM    HGB 17.7 (H) 2024 04:38 AM    HCT 49.3 2024 04:38 AM     2024 04:38 AM    BANDSPCT 3 2024 06:22 AM    SODIUM 139 2024 04:38 AM    K 3.9 2024 04:38 AM     2024 04:38 AM    CO2 25 2024 04:38 AM    BUN 28 (H) 2024 04:38 AM    CREATININE 0.93 2024 04:38 AM    GLUC 233 (H) 2024 04:38 AM    ALKPHOS 80 2024 04:38 AM    ALT 38 2024 04:38 AM    AST 28 2024 04:38 AM    ALB 3.5 2024 04:38 AM    TBILI 1.66 2024 04:38 AM       Most Recent Vitals:   Vitals:    24 0743   BP: 144/91   Pulse: 80   Resp: 20   Temp: 98.5 °F (36.9 °C)   SpO2: 90%        Identified Barriers to Discharge/Discharge Goals/Care Management Interventions:  AMS, adjusting IV ABX    Intended Discharge Disposition: [facility]. No auth required    Expected Discharge Date:  24-48hrs

## 2024-05-01 NOTE — INCIDENTAL FINDINGS
The following findings require follow up:  Radiographic finding   Finding: CT soft tissue neck w contrast: No neck abscess, as clinically questioned., 7.0 cm subcutaneous lipoma in right posterior paramidline neck. Given internal wispy fat heterogeneity, atypical lipomatous tumor in the differential., 1.3 cm subcutaneous lesion in right posterior upper neck laterally adjacent to the fatty lesion, likely sebaceous/epidermal inclusion cyst., No suspicious cervical lymphadenopathy.    Follow up required: yes   Follow up should be done within 1 month(s)    Please notify the following clinician to assist with the follow up:   DELILAH Mcpherson (Primary care)

## 2024-05-01 NOTE — PLAN OF CARE
Problem: Potential for Falls  Goal: Patient will remain free of falls  Description: INTERVENTIONS:  - Educate patient/family on patient safety including physical limitations  - Instruct patient to call for assistance with activity   - Consult OT/PT to assist with strengthening/mobility   - Keep Call bell within reach  - Keep bed low and locked with side rails adjusted as appropriate  - Keep care items and personal belongings within reach  - Initiate and maintain comfort rounds  - Make Fall Risk Sign visible to staff  - Offer Toileting every  Hours, in advance of need  - Initiate/Maintain alarm  - Obtain necessary fall risk management equipment:   Problem: SAFETY,RESTRAINT: NV/NON-SELF DESTRUCTIVE BEHAVIOR  Goal: Remains free of harm/injury (restraint for non violent/non self-detsructive behavior)  Description: INTERVENTIONS:  - Instruct patient/family regarding restraint use   - Assess and monitor physiologic and psychological status   - Provide interventions and comfort measures to meet assessed patient needs   - Identify and implement measures to help patient regain control  - Assess readiness for release of restraint   Outcome: Progressing  Goal: Returns to optimal restraint-free functioning  Description: INTERVENTIONS:  - Assess the patient's behavior and symptoms that indicate continued need for restraint  - Identify and implement measures to help patient regain control  - Assess readiness for release of restraint   Outcome: Progressing     - Apply yellow socks and bracelet for high fall risk patients  - Consider moving patient to room near nurses station  Outcome: Progressing     Problem: Prexisting or High Potential for Compromised Skin Integrity  Goal: Skin integrity is maintained or improved  Description: INTERVENTIONS:  - Identify patients at risk for skin breakdown  - Assess and monitor skin integrity  - Assess and monitor nutrition and hydration status  - Monitor labs   - Assess for incontinence   -  Turn and reposition patient  - Assist with mobility/ambulation  - Relieve pressure over bony prominences  - Avoid friction and shearing  - Provide appropriate hygiene as needed including keeping skin clean and dry  - Evaluate need for skin moisturizer/barrier cream  - Collaborate with interdisciplinary team   - Patient/family teaching  - Consider wound care consult   Outcome: Progressing

## 2024-05-01 NOTE — PROGRESS NOTES
"[facility]  Progress Note  Name: Jeffery Keys I  MRN: [id number]  Unit/Bed#: S -01 I Date of Admission: 4/27/2024   Date of Service: 5/1/2024 I Hospital Day: 4    Assessment/Plan   * Acute metabolic encephalopathy  Assessment & Plan  Procal and WBC trending down  Improved mentation today, able to have conversation. States he does not recall events from yesterday and his friend visiting. Was able to recall that he woke up to urinate and had a BM  Improved PO intake, ate about 30% of dinner last night per RN    Plan  Continue Ancef  Delirium precautions  Urinary retention protocol  Continuous IVF until PO intake improves    Abnormality of soft tissue on posterior neck  Assessment & Plan  Patient with approximately 8x6 cm soft tissue lump on right posterior neck with overlying 1.5 cm round cystic nodule. Per patient and friend Cindy, he has had this \"lump\" on his neck for a long time. Unclear if referring to cyst or larger mass below    CT neck:   No neck abscess, as clinically questioned.  7.0 cm subcutaneous lipoma in right posterior paramidline neck. Given internal wispy fat heterogeneity, atypical lipomatous tumor in the differential.  1.3 cm subcutaneous lesion in right posterior upper neck laterally adjacent to the fatty lesion, likely sebaceous/epidermal inclusion cyst.  No suspicious cervical lymphadenopathy.    Plan  Outpatient follow-up general surgery/ENT    Bacteremia  Assessment & Plan  Blood culture x1 showing GNR, Klebsiella    Continue Ancef  Procal and WBC trending down    Polycythemia  Assessment & Plan  Monitor daily  Consider hematology referral on discharge    UTI (urinary tract infection)  Assessment & Plan  CT CAP: diffuse bladder wall thickening, pericystic inflammatory change.  UA: 1-2 WBC, occasional bacteria  Urine culture no growth  Continue Ancef    Ambulatory dysfunction  Assessment & Plan  PT/OT. Uses cane at baseline    HTN (hypertension), " benign  Assessment & Plan  Prn hydralazine for SBP >180    DM2 (diabetes mellitus, type 2) ([facility])  Assessment & Plan  Lab Results   Component Value Date    HGBA1C 7.5 (H) 2024     Increase Levemir to 25 units HS and Humalog 5 units TID  Resume metformin on discharge  Accucheck, SSI    Sepsis (HCC)  Assessment & Plan  Improving  Ancef based on susceptibilities             VTE Pharmacologic Prophylaxis: VTE Score: 5 High Risk (Score >/= 5) - Pharmacological DVT Prophylaxis Ordered: heparin. Sequential Compression Devices Ordered.    Mobility:   Basic Mobility Inpatient Raw Score: 6  JH-HLM Goal: 2: Bed activities/Dependent transfer  JH-HLM Achieved: 2: Bed activities/Dependent transfer  JH-HLM Goal achieved. Continue to encourage appropriate mobility.    Patient Centered Rounds: I performed bedside rounds with nursing staff today.  Discussions with Specialists or Other Care Team Provider: Geriatrics    Education and Discussions with Family / Patient:  will call.     Current Length of Stay: 4 day(s)  Current Patient Status: Inpatient   Discharge Plan: Anticipate discharge tomorrow to rehab facility.    Code Status: Level 1 - Full Code    Subjective:   NAEON. Per nursing, patient was observed using urinal on his own, feeding himself dinner and interacting more with staff. Patient denies fevers, chills, N/V/D. He stated his stool was dark but upon chart review, he had soft BM on  and  which was documented as brown. No concern for bloody stool from nursing standpoint. Patient could not recall seeing his friend Cindy at bedside yesterday afternoon.    Objective:     Vitals:   Temp (24hrs), Av.3 °F (37.4 °C), Min:98.5 °F (36.9 °C), Max:100.3 °F (37.9 °C)    Temp:  [98.5 °F (36.9 °C)-100.3 °F (37.9 °C)] 98.5 °F (36.9 °C)  HR:  [] 80  Resp:  [20] 20  BP: (142-144)/(84-91) 144/91  SpO2:  [90 %-98 %] 90 %  Body mass index is 27.19 kg/m².     Input and Output Summary (last 24 hours):     Intake/Output  Summary (Last 24 hours) at 5/1/2024 0922  Last data filed at 5/1/2024 0601  Gross per 24 hour   Intake 640 ml   Output 1461 ml   Net -821 ml       Physical Exam:   Physical Exam  Constitutional:       General: He is not in acute distress.     Appearance: Normal appearance. He is ill-appearing.   HENT:      Mouth/Throat:      Mouth: Mucous membranes are moist.      Pharynx: No oropharyngeal exudate.   Eyes:      General: No scleral icterus.     Conjunctiva/sclera: Conjunctivae normal.   Neck:      Comments: Mass on R posterior neck, lipoma on CT scan with adjacent EIC  Cardiovascular:      Rate and Rhythm: Normal rate and regular rhythm.      Pulses: Normal pulses.      Heart sounds: No murmur heard.  Pulmonary:      Effort: Pulmonary effort is normal. No respiratory distress.      Breath sounds: No wheezing or rales.   Abdominal:      General: There is no distension.      Palpations: Abdomen is soft.      Tenderness: There is no abdominal tenderness.   Musculoskeletal:      Cervical back: Neck supple. No tenderness.      Right lower leg: No edema.      Left lower leg: No edema.   Lymphadenopathy:      Cervical: No cervical adenopathy.   Skin:     General: Skin is warm.      Coloration: Skin is not jaundiced.      Findings: No rash.   Neurological:      Mental Status: He is alert. Mental status is at baseline.   Psychiatric:         Mood and Affect: Mood normal.         Behavior: Behavior normal.          Additional Data:     Labs:  Results from last 7 days   Lab Units 05/01/24  0438 04/30/24  0622   WBC Thousand/uL 14.61* 17.99*   HEMOGLOBIN g/dL 17.7* 18.3*   HEMATOCRIT % 49.3 51.6*   PLATELETS Thousands/uL 209 200   BANDS PCT %  --  3   SEGS PCT % 83*  --    LYMPHO PCT % 7* 6*   MONO PCT % 9 8   EOS PCT % 0 0     Results from last 7 days   Lab Units 05/01/24  0438   SODIUM mmol/L 139   POTASSIUM mmol/L 3.9   CHLORIDE mmol/L 104   CO2 mmol/L 25   BUN mg/dL 28*   CREATININE mg/dL 0.93   ANION GAP mmol/L 10   CALCIUM  mg/dL 8.5   ALBUMIN g/dL 3.5   TOTAL BILIRUBIN mg/dL 1.66   ALK PHOS U/L 80   ALT U/L 38   AST U/L 28   GLUCOSE RANDOM mg/dL 233*     Results from last 7 days   Lab Units 04/27/24 1922   INR  1.08     Results from last 7 days   Lab Units 05/01/24  0740 04/30/24  2107 04/30/24  1703 04/30/24  1142 04/30/24  0718 04/29/24  2057 04/29/24  1608 04/29/24  1039 04/29/24  0753 04/28/24  2047 04/28/24  1703 04/28/24  1109   POC GLUCOSE mg/dl 245* 304* 356* 316* 183* 255* 306* 242* 226* 251* 245* 318*     Results from last 7 days   Lab Units 04/27/24 1922   HEMOGLOBIN A1C % 7.5*     Results from last 7 days   Lab Units 05/01/24  0438 04/30/24  0622 04/29/24  0616 04/27/24 1922   LACTIC ACID mmol/L  --   --   --  1.6   PROCALCITONIN ng/ml 0.35* 0.53* 0.76* 1.05*       Lines/Drains:  Invasive Devices       Peripheral Intravenous Line  Duration             Peripheral IV 04/27/24 Right Antecubital 3 days              Drain  Duration             External Urinary Catheter Medium 3 days                          Imaging: No pertinent imaging reviewed.    Recent Cultures (last 7 days):   Results from last 7 days   Lab Units 04/27/24  1953 04/27/24  1922   BLOOD CULTURE   --  No Growth at 72 hrs.  Klebsiella pneumoniae*   GRAM STAIN RESULT   --  Gram negative rods*   URINE CULTURE  No Growth <1000 cfu/mL  --        Last 24 Hours Medication List:   Current Facility-Administered Medications   Medication Dose Route Frequency Provider Last Rate    acetaminophen  650 mg Oral Q6H PRN Luz Thayer PA-C      aspirin  81 mg Oral Daily DELILAH Spain      cefazolin  2,000 mg Intravenous Q8H Kenny Hawkins MD 2,000 mg (05/01/24 0235)    heparin (porcine)  5,000 Units Subcutaneous Q8H [facility] DELILAH Spain      hydrALAZINE  10 mg Intravenous Q8H PRN Genaro Tian MD      insulin detemir  25 Units Subcutaneous HS Kenny Hawkins MD      insulin lispro  1-5 Units Subcutaneous 4x Daily (AC & HS) Kenny Hawkins MD      insulin lispro  5 Units  Subcutaneous TID With Meals Kenny Hawkins MD      latanoprost  1 drop Both Eyes HS DELILAH Spain      OLANZapine  2.5 mg Oral Once PRN Shaunna Rodriguez MD      pravastatin  40 mg Oral Daily With Dinner DELILAH Spain      sodium chloride  75 mL/hr Intravenous Continuous Kenny Hawkins MD 75 mL/hr (04/30/24 2002)    tamsulosin  0.4 mg Oral Daily DELILAH Spain          Today, Patient Was Seen By: Kenny Hawkins MD    **Please Note: This note may have been constructed using a voice recognition system.**

## 2024-05-01 NOTE — DISCHARGE INSTR - AVS FIRST PAGE
Dear Jeffery Keys,     [facility] was [facility]  It is our hope that we were always able to exceed the expected standards for your care during your stay.  You were hospitalized due to urinary tract infection and bacteremia.  You were cared for on the [facility] 3rd floor by Kenny Hawkins MD under the service of Ganesh Frazier MD with the [facility] who covers for your primary care physician (PCP), DELILAH Mcpherson, while you were hospitalized.  If you have any questions or concerns related to this hospitalization, you may contact us at .  For follow up as well as any medication refills, we recommend that you follow up with your primary care physician.  A registered nurse will reach out to you by phone within a few days after your discharge to answer any additional questions that you may have after going home.  However, at this time we provide for you here, the most important instructions / recommendations at discharge:     Notable Medication Adjustments -   Continue cephalexin 500 mg every 6 hours until end of day 5/6/24. This is an antibiotic to treat your UTI and bacteremia  Continue Levemir 30 units at bedtime for diabetes  Continue Humalog 7 units 3 times a day with meals for diabetes  Continue escitalopram 5 mg daily. This medication is to help with your depressed mood  Stop taking metformin   Testing Required after Discharge -   None  Important follow up information -   Follow-up with your PCP within 1 week of discharge to discuss your hospital course and medication adjustments. You will likely require adjustments to your insulin regimen.  CT soft tissue neck w contrast: No neck abscess, as clinically questioned., 7.0 cm subcutaneous lipoma in right posterior paramidline neck. Given internal wispy fat heterogeneity, atypical lipomatous tumor in the differential., 1.3 cm subcutaneous lesion in right posterior  upper neck laterally adjacent to the fatty lesion, likely sebaceous/epidermal inclusion cyst., No suspicious cervical lymphadenopathy. Please follow-up with your primary care doctor to discuss this lesion and speak about risks vs benefits about having this excised.  Follow-up with the geriatric specialist at [facility]. We sent a referral for you. You will also need a fit-to-drive test done  Other Instructions -   If you feel unwell with fevers, chills, confusion, burning with urination please call 911 to be evaluated in the hospital.   Check your blood sugar frequently and use your insulin as directed by your primary care doctor.  Please review this entire after visit summary as additional general instructions including medication list, appointments, activity, diet, any pertinent wound care, and other additional recommendations from your care team that may be provided for you.      Sincerely,     Kenny Hawkins MD

## 2024-05-01 NOTE — QUICK NOTE
"Per morning RN, POLST dropped off by Cindy this morning and copy made. Spoke with patient at bedside. He reiterated his DNR/DNI status. When asked about the \"no antibiotics\" on his POLST, he stated he would like to continue with antibiotics during this admission. He is unsure whether he will accept rehab placement as he did not have a great experience the last time he went. Attempted to call Cindy with update, no answer.  "

## 2024-05-01 NOTE — PROGRESS NOTES
Progress Note - Geriatric Medicine   Jeffery Keys 88 y.o. male MRN: [id number]  Unit/Bed#: S -01 Encounter: [id number]      Assessment/Plan:  Cognitive impairment  Assessment & Plan  Currently stable, seems to be slightly depressed/anxious  Will continue to provide supportive care, reorient as needed.  Patient is at high risk for delirium, will monitor closely and place on delirium precautions.  Maintain sleep/wake cycle.  Optimize pain regimen.  Monitor for constipation and urinary retention and manage as needed.  B12 level less than 400 start supplements, TSH within normal limits  Encourage family to visit.  Encourage to wear glasses and hearing aids while awake.  Encourage po intake, assist with feeding if needed.   Per chart review patient scored 17 on PHQ with PCP in February 2024 and he is a poor historian in general.  Consider starting Lexapro 5 mg daily avoid supportive care  Scored 1/5 on mini cog, CT head shows chronic angiopathic changes  Recommend follow-up with geriatrics as outpatient and fit to drive test on discharge      Polycythemia  Assessment & Plan  Consider further workup  Monitor CBC    Leukocytosis  Assessment & Plan  Slowly trending down most likely in context of bacteremia  Continue to monitor    Bacteremia  Assessment & Plan  Blood culture positive for Klebsiella  Continue antibiotics-currently on Ancef  Manage as per primary team    UTI (urinary tract infection)  Assessment & Plan  CT chest abdomen and pelvis showed diffuse bladder wall thickening-consider further workup with urology  Urine culture no growth  Blood culture positive for Klebsiella, continue antibiotic regimen as per primary team    Ambulatory dysfunction  Assessment & Plan  Patient uses a cane for ambulation at baseline  He reports falls in the past year  Monitor orthostatic vital signs  Encourage p.o. hydration  Avoid hypotension and hypoglycemia   Consider telemetry monitoring while in the hospital    DM2  (diabetes mellitus, type 2) ([facility])  Assessment & Plan  Lab Results   Component Value Date    HGBA1C 7.5 (H) 04/27/2024       Recent Labs     04/30/24  1703 04/30/24  2107 05/01/24  0740 05/01/24  1113   POCGLU 356* 304* 245* 271*       Blood Sugar Average: Last 72 hrs:  (P) 277.1480452133271289    Continue to monitor Accu-Cheks, avoid hypoglycemia  Consider increasing Levemir to 30 units daily-Home dose  Hold metformin while in the hospital    * Acute metabolic encephalopathy  Assessment & Plan  - Per chart review patient was alert oriented x 4 on admission to the hospital, the time of encounter he was alert oriented x 3, patient not able to remember why and how he got to the hospital  -Had difficulty reciting days of the week backwards, but was able to do it with cues  -Patient is high risk of delirium due to bacteremia, hospitalization, cefepime use, possible cognitive impairment at baseline  -delirium precautions  -maintain normal sleep/wake cycle  -minimize overnight interruptions, group overnight vitals/labs/nursing checks as possible  -dim lights, close blinds and turn off tv to minimize stimulation and encourage sleep environment in evenings  -ensure that pain is well controlled, consider Tylenol 975mg  scheduled   -monitor for fecal and urinary retention which may precipitate delirium  -encourage early mobilization and ambulation  -provide frequent reorientation and redirection  -encourage family and friends at the bedside to help calm patient if anxious  -avoid medications which may precipitate or worsen delirium such as tramadol, benzodiazepine, anticholinergics, and antihistaminics  -encourage hydration and nutrition , assist with feeding if needed  -redirect unwanted behaviors as first line, avoid physical restraints.   -QTc 457, continue to monitor  -Patient did not require any chemical restraint since admission to the hospital  -I suspect cognitive impairment at baseline-consider follow-up with  geriatrics as outpatient when patient is medically stable and in a familiar environment.    CT head showed chronic microangiopathic changes       Subjective:   Patient seen and examined at bedside for geriatric follow-up.  Waning of headache/dizziness.  He does not remember if he slept well overnight.  Appetite is poor.  Denies difficulty swallowing.    Review of Systems   Constitutional:  Positive for appetite change. Negative for chills, fatigue and fever.   HENT:  Positive for hearing loss. Negative for congestion, rhinorrhea and sore throat.    Respiratory:  Negative for cough, shortness of breath and wheezing.    Cardiovascular:  Negative for chest pain.   Gastrointestinal:  Positive for abdominal pain and constipation. Negative for nausea.   Genitourinary:  Negative for dysuria and hematuria.   Musculoskeletal:  Positive for gait problem.   Skin:  Negative for rash and wound.   Allergic/Immunologic: Negative for environmental allergies.   Neurological:  Positive for dizziness and headaches. Negative for syncope.   Hematological:  Does not bruise/bleed easily.   Psychiatric/Behavioral:  Positive for confusion. Negative for behavioral problems and sleep disturbance.          Objective:     Vitals: Blood pressure 144/91, pulse 80, temperature 98.5 °F (36.9 °C), resp. rate 20, weight 81.1 kg (178 lb 12.7 oz), SpO2 90%.,Body mass index is 27.19 kg/m².      Intake/Output Summary (Last 24 hours) at 5/1/2024 1321  Last data filed at 5/1/2024 1258  Gross per 24 hour   Intake 680 ml   Output 1311 ml   Net -631 ml       Current Medications: Reviewed    Physical Exam:   Physical Exam  Vitals and nursing note reviewed.   Constitutional:       General: He is not in acute distress.     Appearance: He is well-developed.      Comments: Frail looking   HENT:      Head: Normocephalic and atraumatic.      Ears:      Comments: Kiana     Mouth/Throat:      Mouth: Mucous membranes are dry.   Eyes:      Conjunctiva/sclera: Conjunctivae  normal.   Neck:      Comments: Mass R cervical area  Cardiovascular:      Rate and Rhythm: Regular rhythm. Tachycardia present.      Heart sounds: Heart sounds are distant. No murmur heard.  Pulmonary:      Effort: Pulmonary effort is normal. No respiratory distress.      Breath sounds: Normal breath sounds.   Abdominal:      Palpations: Abdomen is soft.      Tenderness: There is no abdominal tenderness.   Musculoskeletal:         General: No swelling.      Cervical back: Neck supple.      Right lower leg: No edema.      Left lower leg: No edema.   Skin:     General: Skin is warm and dry.      Capillary Refill: Capillary refill takes less than 2 seconds.   Neurological:      Mental Status: He is alert and oriented to person, place, and time.   Psychiatric:         Mood and Affect: Mood normal.          Invasive Devices       Peripheral Intravenous Line  Duration             Peripheral IV 04/27/24 Right Antecubital 3 days              Drain  Duration             External Urinary Catheter Medium 3 days                    Lab, Imaging and other studies: I have personally reviewed pertinent reports.

## 2024-05-01 NOTE — ASSESSMENT & PLAN NOTE
Currently stable, seems to be slightly depressed/anxious  Will continue to provide supportive care, reorient as needed.  Patient is at high risk for delirium, will monitor closely and place on delirium precautions.  Maintain sleep/wake cycle.  Optimize pain regimen.  Monitor for constipation and urinary retention and manage as needed.  B12 level less than 400 start supplements, TSH within normal limits  Encourage family to visit.  Encourage to wear glasses and hearing aids while awake.  Encourage po intake, assist with feeding if needed.   Per chart review patient scored 17 on PHQ with PCP in February 2024 and he is a poor historian in general.  Start Lexapro 5 mg daily provide supportive care  Psychiatry consult pending  Scored 1/5 on mini cog, CT head shows chronic angiopathic changes  Recommend follow-up with geriatrics as outpatient and fit to drive test on discharge

## 2024-05-01 NOTE — PHYSICAL THERAPY NOTE
"                           PHYSICAL THERAPY NOTE          Patient Name: Jeffery Keys  Today's Date: 5/1/2024 05/01/24 1453   PT Last Visit   PT Visit Date 05/01/24   Note Type   Note Type Treatment   Pain Assessment   Pain Assessment Tool 0-10   Pain Score No Pain   Restrictions/Precautions   Other Precautions Cognitive;Chair Alarm;Bed Alarm;Multiple lines;Fall Risk;Hard of hearing   General   Chart Reviewed Yes   Response to Previous Treatment Patient unable to report, no changes reported from family or staff   Cognition   Overall Cognitive Status Impaired   Arousal/Participation Cooperative   Attention Attends with cues to redirect   Orientation Level Oriented to person;Oriented to place;Oriented to time   Memory Decreased recall of precautions;Decreased recall of recent events   Following Commands Follows one step commands with increased time or repetition  (in part dt [facility])   Subjective   Subjective \"I feel dizzy\"   Bed Mobility   Supine to Sit 4  Minimal assistance   Additional items Assist x 1;HOB elevated;Bedrails;Increased time required;Verbal cues   Additional Comments assist to reposition hips at EOB and some trunk support   Transfers   Sit to Stand 5  Supervision   Additional items Increased time required;Verbal cues;Other  (RW)   Stand to Sit 5  Supervision   Additional items Increased time required;Verbal cues;Other  (RW)   Additional Comments cues for technique including hand placement   Ambulation/Elevation   Gait pattern Forward Flexion;Short stride;Excessively slow   Gait Assistance 5  Supervision   Additional items Assist x 1   Assistive Device Rolling walker   Distance 15'   Balance   Static Standing Fair   Dynamic Standing Fair -   Ambulatory Fair -   Endurance Deficit   Endurance Deficit Description appears generally deconditioned   Activity Tolerance   Activity Tolerance Patient tolerated treatment well;Patient limited by fatigue   Medical Staff Made Aware PCA   Exercises   Hip Extension " Standing;10 reps;Bilateral  (w RW)   Knee AROM Long Arc Quad Sitting;15 reps;Bilateral   Ankle Pumps Sitting;15 reps;Bilateral   Squat Standing;10 reps  (w RW)   Marching Sitting;15 reps;Bilateral   Assessment   Prognosis Good   Problem List Decreased strength;Impaired balance;Decreased mobility;Decreased endurance;Decreased cognition;Impaired hearing   Assessment Jeffery demonstrates progress towards goals including improved am-pac, progression to ambulation, decreased level of assist for all functional mobility. Currently ambulating limited household distances. Does not require physical assist for OOB mobility however does require frequent cuing for direction, safety. Continues to present w deficits of cognition, strength, endurance, balance. As prev mentioned, frequent cuing and constant S needed for safety during functional mobility tasks. Continues to require physical assist to complete OOB transfers. Noted decreased activity tolerance, including standing and walking tolerance, compared to baseline. Noted increased reliance on more restrictive AD compared to baseline, with increased fall risk secondary to prev mentioned factors. Would continue to benefit from IP PT services to help facilitate recovery to baseline and optimize mobility for dc home alone.   Barriers to Discharge Decreased caregiver support   Barriers to Discharge Comments lives alone- confused, fall risk   Goals   Patient Goals feel better   [facility] Expiration Date 05/08/24   Short Term Goal #1 Pt will be able to: (1) perform bed mobility with supervision to promote OOB activity (2) perform sit to stand with supervision to decrease burden of care (3) ambulate at least 200` with supervision and least restrictive AD to increase activity tolerance (4) increase standing balance by 1 grade to decrease risk of falls   PT Treatment Day 2   Plan   Treatment/Interventions Functional transfer training;LE strengthening/ROM;Therapeutic exercise;Endurance  training;Cognitive reorientation;Patient/family training;Bed mobility;Equipment eval/education;Gait training;Compensatory technique education;Continued evaluation;Spoke to nursing   Progress Progressing toward goals   PT Frequency 3-5x/wk   Discharge Recommendation   Rehab Resource Intensity Level, PT I (Maximum Resource Intensity)   AM-PAC Basic Mobility Inpatient   Turning in Flat Bed Without Bedrails 3   Lying on Back to Sitting on Edge of Flat Bed Without Bedrails 3   Moving Bed to Chair 3   Standing Up From Chair Using Arms 3   Walk in Room 3   Climb 3-5 Stairs With Railing 3   Basic Mobility Inpatient Raw Score 18   Basic Mobility Standardized Score 41.05   [facility] Highest Level Of Mobility   -HLM Goal 6: Walk 10 steps or more   -HLM Achieved 6: Walk 10 steps or more   Education   Education Provided Mobility training;Home exercise program;Assistive device   Patient Reinforcement needed   End of Consult   Patient Position at End of Consult Bedside chair;Bed/Chair alarm activated;All needs within reach     Luz Barbour, PT

## 2024-05-01 NOTE — PLAN OF CARE
Problem: PHYSICAL THERAPY ADULT  Goal: Performs mobility at highest level of function for planned discharge setting.  See evaluation for individualized goals.  Description: Treatment/Interventions: Functional transfer training, LE strengthening/ROM, Therapeutic exercise, Endurance training, Cognitive reorientation, Patient/family training, Bed mobility, Equipment eval/education, Gait training  Equipment Recommended:  (pending trials SPC vs RW)       See flowsheet documentation for full assessment, interventions and recommendations.  Outcome: Progressing  Note: Prognosis: Good  Problem List: Decreased strength, Impaired balance, Decreased mobility, Decreased endurance, Decreased cognition, Impaired hearing  Assessment: Jeffery demonstrates progress towards goals including improved am-pac, progression to ambulation, decreased level of assist for all functional mobility. Currently ambulating limited household distances. Does not require physical assist for OOB mobility however does require frequent cuing for direction, safety. Continues to present w deficits of cognition, strength, endurance, balance. As prev mentioned, frequent cuing and constant S needed for safety during functional mobility tasks. Continues to require physical assist to complete OOB transfers. Noted decreased activity tolerance, including standing and walking tolerance, compared to baseline. Noted increased reliance on more restrictive AD compared to baseline, with increased fall risk secondary to prev mentioned factors. Would continue to benefit from IP PT services to help facilitate recovery to baseline and optimize mobility for dc home alone.  Barriers to Discharge: Decreased caregiver support  Barriers to Discharge Comments: lives alone- confused, fall risk  Rehab Resource Intensity Level, PT: I (Maximum Resource Intensity)    See flowsheet documentation for full assessment.

## 2024-05-01 NOTE — PLAN OF CARE
Problem: Potential for Falls  Goal: Patient will remain free of falls  Description: INTERVENTIONS:  - Educate patient/family on patient safety including physical limitations  - Instruct patient to call for assistance with activity   - Consult OT/PT to assist with strengthening/mobility   - Keep Call bell within reach  - Keep bed low and locked with side rails adjusted as appropriate  - Keep care items and personal belongings within reach  - Initiate and maintain comfort rounds  - Make Fall Risk Sign visible to staff  - Offer Toileting every 2 Hours, in advance of need  - Initiate/Maintain bed alarm  - Obtain necessary fall risk management equipment: bed alarm  - Apply yellow socks and bracelet for high fall risk patients  - Consider moving patient to room near nurses station  Outcome: Progressing     Problem: Prexisting or High Potential for Compromised Skin Integrity  Goal: Skin integrity is maintained or improved  Description: INTERVENTIONS:  - Identify patients at risk for skin breakdown  - Assess and monitor skin integrity  - Assess and monitor nutrition and hydration status  - Monitor labs   - Assess for incontinence   - Turn and reposition patient  - Assist with mobility/ambulation  - Relieve pressure over bony prominences  - Avoid friction and shearing  - Provide appropriate hygiene as needed including keeping skin clean and dry  - Evaluate need for skin moisturizer/barrier cream  - Collaborate with interdisciplinary team   - Patient/family teaching  - Consider wound care consult   Outcome: Progressing     Problem: SAFETY,RESTRAINT: NV/NON-SELF DESTRUCTIVE BEHAVIOR  Goal: Remains free of harm/injury (restraint for non violent/non self-detsructive behavior)  Description: INTERVENTIONS:  - Instruct patient/family regarding restraint use   - Assess and monitor physiologic and psychological status   - Provide interventions and comfort measures to meet assessed patient needs   - Identify and implement measures  to help patient regain control  - Assess readiness for release of restraint   Outcome: Progressing  Goal: Returns to optimal restraint-free functioning  Description: INTERVENTIONS:  - Assess the patient's behavior and symptoms that indicate continued need for restraint  - Identify and implement measures to help patient regain control  - Assess readiness for release of restraint   Outcome: Progressing

## 2024-05-01 NOTE — ASSESSMENT & PLAN NOTE
Lab Results   Component Value Date    HGBA1C 7.5 (H) 04/27/2024     Increase Levemir to 25 units HS and Humalog 5 units TID  Resume metformin on discharge  Accucheck, SSI

## 2024-05-02 PROBLEM — R45.89 DYSPHORIC MOOD: Status: ACTIVE | Noted: 2024-05-02

## 2024-05-02 LAB
ANION GAP SERPL CALCULATED.3IONS-SCNC: 10 MMOL/L (ref 4–13)
BASOPHILS # BLD AUTO: 0.07 THOUSANDS/ÂΜL (ref 0–0.1)
BASOPHILS NFR BLD AUTO: 0 % (ref 0–1)
BUN SERPL-MCNC: 26 MG/DL (ref 5–25)
CALCIUM SERPL-MCNC: 8.7 MG/DL (ref 8.4–10.2)
CHLORIDE SERPL-SCNC: 105 MMOL/L (ref 96–108)
CO2 SERPL-SCNC: 22 MMOL/L (ref 21–32)
CREAT SERPL-MCNC: 0.98 MG/DL (ref 0.6–1.3)
EOSINOPHIL # BLD AUTO: 0.04 THOUSAND/ÂΜL (ref 0–0.61)
EOSINOPHIL NFR BLD AUTO: 0 % (ref 0–6)
ERYTHROCYTE [DISTWIDTH] IN BLOOD BY AUTOMATED COUNT: 13.1 % (ref 11.6–15.1)
GFR SERPL CREATININE-BSD FRML MDRD: 69 ML/MIN/1.73SQ M
GLUCOSE SERPL-MCNC: 204 MG/DL (ref 65–140)
GLUCOSE SERPL-MCNC: 245 MG/DL (ref 65–140)
GLUCOSE SERPL-MCNC: 250 MG/DL (ref 65–140)
GLUCOSE SERPL-MCNC: 263 MG/DL (ref 65–140)
GLUCOSE SERPL-MCNC: 320 MG/DL (ref 65–140)
HCT VFR BLD AUTO: 48.5 % (ref 36.5–49.3)
HGB BLD-MCNC: 17.3 G/DL (ref 12–17)
IMM GRANULOCYTES # BLD AUTO: 0.21 THOUSAND/UL (ref 0–0.2)
IMM GRANULOCYTES NFR BLD AUTO: 1 % (ref 0–2)
LYMPHOCYTES # BLD AUTO: 1.05 THOUSANDS/ÂΜL (ref 0.6–4.47)
LYMPHOCYTES NFR BLD AUTO: 7 % (ref 14–44)
MAGNESIUM SERPL-MCNC: 1.9 MG/DL (ref 1.9–2.7)
MCH RBC QN AUTO: 32 PG (ref 26.8–34.3)
MCHC RBC AUTO-ENTMCNC: 35.7 G/DL (ref 31.4–37.4)
MCV RBC AUTO: 90 FL (ref 82–98)
MONOCYTES # BLD AUTO: 1.24 THOUSAND/ÂΜL (ref 0.17–1.22)
MONOCYTES NFR BLD AUTO: 8 % (ref 4–12)
NEUTROPHILS # BLD AUTO: 13.35 THOUSANDS/ÂΜL (ref 1.85–7.62)
NEUTS SEG NFR BLD AUTO: 84 % (ref 43–75)
NRBC BLD AUTO-RTO: 0 /100 WBCS
PLATELET # BLD AUTO: 195 THOUSANDS/UL (ref 149–390)
PMV BLD AUTO: 10.7 FL (ref 8.9–12.7)
POTASSIUM SERPL-SCNC: 3.5 MMOL/L (ref 3.5–5.3)
PROCALCITONIN SERPL-MCNC: 0.4 NG/ML
RBC # BLD AUTO: 5.4 MILLION/UL (ref 3.88–5.62)
SODIUM SERPL-SCNC: 137 MMOL/L (ref 135–147)
WBC # BLD AUTO: 15.96 THOUSAND/UL (ref 4.31–10.16)

## 2024-05-02 PROCEDURE — 84145 PROCALCITONIN (PCT): CPT | Performed by: PHARMACIST

## 2024-05-02 PROCEDURE — 97116 GAIT TRAINING THERAPY: CPT

## 2024-05-02 PROCEDURE — 80048 BASIC METABOLIC PNL TOTAL CA: CPT | Performed by: PHARMACIST

## 2024-05-02 PROCEDURE — 82948 REAGENT STRIP/BLOOD GLUCOSE: CPT

## 2024-05-02 PROCEDURE — 99232 SBSQ HOSP IP/OBS MODERATE 35: CPT | Performed by: FAMILY MEDICINE

## 2024-05-02 PROCEDURE — 99497 ADVNCD CARE PLAN 30 MIN: CPT | Performed by: HOSPITALIST

## 2024-05-02 PROCEDURE — 83735 ASSAY OF MAGNESIUM: CPT | Performed by: PHARMACIST

## 2024-05-02 PROCEDURE — 99232 SBSQ HOSP IP/OBS MODERATE 35: CPT | Performed by: HOSPITALIST

## 2024-05-02 PROCEDURE — 85025 COMPLETE CBC W/AUTO DIFF WBC: CPT | Performed by: PHARMACIST

## 2024-05-02 RX ORDER — ESCITALOPRAM OXALATE 10 MG/1
5 TABLET ORAL DAILY
Status: DISCONTINUED | OUTPATIENT
Start: 2024-05-02 | End: 2024-05-03 | Stop reason: HOSPADM

## 2024-05-02 RX ORDER — SIMETHICONE 80 MG
80 TABLET,CHEWABLE ORAL
Status: DISCONTINUED | OUTPATIENT
Start: 2024-05-02 | End: 2024-05-03 | Stop reason: HOSPADM

## 2024-05-02 RX ORDER — INSULIN LISPRO 100 [IU]/ML
7 INJECTION, SOLUTION INTRAVENOUS; SUBCUTANEOUS
Status: DISCONTINUED | OUTPATIENT
Start: 2024-05-02 | End: 2024-05-03 | Stop reason: HOSPADM

## 2024-05-02 RX ORDER — INSULIN LISPRO 100 [IU]/ML
1-6 INJECTION, SOLUTION INTRAVENOUS; SUBCUTANEOUS
Status: DISCONTINUED | OUTPATIENT
Start: 2024-05-02 | End: 2024-05-03 | Stop reason: HOSPADM

## 2024-05-02 RX ADMIN — ESCITALOPRAM OXALATE 5 MG: 10 TABLET ORAL at 11:26

## 2024-05-02 RX ADMIN — HEPARIN SODIUM 5000 UNITS: 5000 INJECTION INTRAVENOUS; SUBCUTANEOUS at 05:37

## 2024-05-02 RX ADMIN — CEFAZOLIN SODIUM 2000 MG: 2 SOLUTION INTRAVENOUS at 19:32

## 2024-05-02 RX ADMIN — CEFAZOLIN SODIUM 2000 MG: 2 SOLUTION INTRAVENOUS at 02:18

## 2024-05-02 RX ADMIN — INSULIN LISPRO 3 UNITS: 100 INJECTION, SOLUTION INTRAVENOUS; SUBCUTANEOUS at 12:17

## 2024-05-02 RX ADMIN — ASPIRIN 81 MG: 81 TABLET, COATED ORAL at 08:30

## 2024-05-02 RX ADMIN — INSULIN LISPRO 7 UNITS: 100 INJECTION, SOLUTION INTRAVENOUS; SUBCUTANEOUS at 17:14

## 2024-05-02 RX ADMIN — INSULIN LISPRO 3 UNITS: 100 INJECTION, SOLUTION INTRAVENOUS; SUBCUTANEOUS at 17:14

## 2024-05-02 RX ADMIN — INSULIN DETEMIR 30 UNITS: 100 INJECTION, SOLUTION SUBCUTANEOUS at 21:23

## 2024-05-02 RX ADMIN — INSULIN LISPRO 2 UNITS: 100 INJECTION, SOLUTION INTRAVENOUS; SUBCUTANEOUS at 08:34

## 2024-05-02 RX ADMIN — LATANOPROST 1 DROP: 50 SOLUTION OPHTHALMIC at 21:48

## 2024-05-02 RX ADMIN — INSULIN LISPRO 5 UNITS: 100 INJECTION, SOLUTION INTRAVENOUS; SUBCUTANEOUS at 08:33

## 2024-05-02 RX ADMIN — TAMSULOSIN HYDROCHLORIDE 0.4 MG: 0.4 CAPSULE ORAL at 08:30

## 2024-05-02 RX ADMIN — SIMETHICONE 80 MG: 80 TABLET, CHEWABLE ORAL at 12:17

## 2024-05-02 RX ADMIN — SIMETHICONE 80 MG: 80 TABLET, CHEWABLE ORAL at 21:22

## 2024-05-02 RX ADMIN — INSULIN LISPRO 7 UNITS: 100 INJECTION, SOLUTION INTRAVENOUS; SUBCUTANEOUS at 12:17

## 2024-05-02 RX ADMIN — SIMETHICONE 80 MG: 80 TABLET, CHEWABLE ORAL at 17:13

## 2024-05-02 RX ADMIN — PRAVASTATIN SODIUM 40 MG: 40 TABLET ORAL at 17:13

## 2024-05-02 RX ADMIN — HEPARIN SODIUM 5000 UNITS: 5000 INJECTION INTRAVENOUS; SUBCUTANEOUS at 21:22

## 2024-05-02 RX ADMIN — INSULIN LISPRO 3 UNITS: 100 INJECTION, SOLUTION INTRAVENOUS; SUBCUTANEOUS at 21:22

## 2024-05-02 RX ADMIN — CEFAZOLIN SODIUM 2000 MG: 2 SOLUTION INTRAVENOUS at 11:30

## 2024-05-02 RX ADMIN — HEPARIN SODIUM 5000 UNITS: 5000 INJECTION INTRAVENOUS; SUBCUTANEOUS at 17:13

## 2024-05-02 NOTE — ASSESSMENT & PLAN NOTE
Lab Results   Component Value Date    HGBA1C 7.5 (H) 04/27/2024     Increase Levemir to 30 units HS and Humalog 7 units TID  Accucheck, SSI

## 2024-05-02 NOTE — PHYSICAL THERAPY NOTE
"                                                     PHYSICAL THERAPY TREATMENT NOTE          Patient Name: Jeffery Keys  Today's Date: 2024        AGE:   88 y.o.  Mrn:   [id number]  ADMIT DX:  Cystitis [N30.90]  Encephalopathy [G93.40]  Sepsis (HCC) [A41.9]    Past Medical History:  Past Medical History:   Diagnosis Date    Cardiac disease     Diabetes mellitus (HCC)     Hyperlipidemia     Hypertension     Hyperthyroidism         24 0938   PT Last Visit   PT Visit Date 24   Note Type   Note Type Treatment   Pain Assessment   Pain Assessment Tool 0-10   Pain Score No Pain   Restrictions/Precautions   Weight Bearing Precautions Per Order No   Other Precautions Cognitive;Chair Alarm;Bed Alarm;Multiple lines;Fall Risk;Hard of hearing  (IV pole)   General   Chart Reviewed Yes   Additional Pertinent History During session, pt admits to being confused and not understanding why is is here. Pt educated on reason for hospitalization and reoriented throughout as needed. Pt making multiple comments throughout, \"why can't I just pass away\", \"I'm disgusted with life\". Reports feeling this way for \"years\". Notified RN Kevin, ELIZABETH Bhardwaj, and Resident Kenny Hawkins of pt's comments   Family/Caregiver Present No   Cognition   Overall Cognitive Status Impaired   Arousal/Participation Cooperative   Attention Attends with cues to redirect   Orientation Level Oriented to person;Oriented to place;Oriented to time;Disoriented to situation   Memory Decreased short term memory;Decreased recall of recent events;Decreased recall of precautions   Following Commands Follows one step commands with increased time or repetition   Comments Pt ID via name and ; pt agreeable to PT tx and mobility. Pt pleasant throughout, appears to have limited medical/situational awareness   Bed Mobility   Supine to Sit 4  Minimal assistance   Additional items Assist x 1;HOB elevated;Bedrails;Increased time required;Verbal cues   Additional Comments " able to maintain sitting balance at EOB w/ supervision for approx 5 min   Transfers   Sit to Stand 4  Minimal assistance   Additional items Assist x 1;Increased time required;Verbal cues   Stand to Sit 5  Supervision   Additional items Assist x 1;Armrests;Increased time required;Verbal cues   Toilet transfer 4  Minimal assistance   Additional items Assist x 1;Increased time required;Standard toilet;Verbal cues  (VC for use of L grab bar)   Additional Comments 4 sit<>stand transfers performed, fluctuated between supervision-min Ax1, VC for hand placement throughout   Ambulation/Elevation   Gait pattern Wide BRIDGET;Decreased foot clearance;Short stride;Excessively slow;Decreased hip extension   Gait Assistance 4  Minimal assist   Additional items Assist x 1;Verbal cues   Assistive Device Rolling walker   Distance 25'x2   Balance   Static Sitting Fair +   Dynamic Sitting Fair   Static Standing Fair -  (w/ RW)   Dynamic Standing Poor +   Ambulatory Poor +  (w/ RW)   Activity Tolerance   Activity Tolerance Patient tolerated treatment well   Nurse Made Aware PORFIRIO Brown   Medical Staff Made Aware ELIZABETH Bhardwaj, Resident Kenny Hawkins   Assessment   Prognosis Good   Problem List Decreased strength;Decreased endurance;Impaired balance;Decreased mobility;Decreased cognition;Decreased safety awareness;Impaired judgement   Assessment PT treatment provided today to address deficits of: impaired balance, decreased activity tolerance, gait deviations. Interventions provided include: bed mobility, multiple transfer and gait trials w/ RW in order to challenge pt's activity tolerance and to maximize pt independence w/ functional mobility during current admission w/ acute metabolic encephalopathy. Compared to previous session, pt w/ increased overall activity tolerance. Pt demonstrated ability to perform multiple transfer and gait trials w/ supervision-min Ax1. Education provided to pt on RW management. Pt continues to be functioning below baseline  level, and remains limited in functional mobility due to impaired cognition, impaired balance, gait deviations. Pt continues to require Ax1 for OOB and presents w/ impaired cognition, impaired balance, decreased safety awareness, and limited insight. Pt will continue benefit from PT to promote independence w/ functional mobility and progress towards set goals. Recommend DC w/ level: II (Moderate Rehab Resource Intensity) when medically cleared.   Barriers to Discharge Decreased caregiver support  (pt lives alone)   Goals   Patient Goals to figure out what is going on   STG Expiration Date 05/08/24   Short Term Goal #1 Pt will be able to: (1) perform bed mobility with supervision to promote OOB activity (2) perform sit to stand with supervision to decrease burden of care (3) ambulate at least 200` with supervision and least restrictive AD to increase activity tolerance (4) increase standing balance by 1 grade to decrease risk of falls   PT Treatment Day 3   Plan   Treatment/Interventions Functional transfer training;LE strengthening/ROM;Therapeutic exercise;Endurance training;Cognitive reorientation;Patient/family training;Equipment eval/education;Bed mobility;Gait training;Compensatory technique education   Progress Progressing toward goals   PT Frequency 3-5x/wk   Discharge Recommendation   Rehab Resource Intensity Level, PT II (Moderate Resource Intensity)   Equipment Recommended Walker   Walker Package Recommended Wheeled walker   Change/add to Walker Package? No   AM-PAC Basic Mobility Inpatient   Turning in Flat Bed Without Bedrails 3   Lying on Back to Sitting on Edge of Flat Bed Without Bedrails 2   Moving Bed to Chair 3   Standing Up From Chair Using Arms 3   Walk in Room 3   Climb 3-5 Stairs With Railing 2   Basic Mobility Inpatient Raw Score 16   Basic Mobility Standardized Score 38.32   [facility] Highest Level Of Mobility   -HL Goal 5: Stand one or more mins   -HLM Achieved 7: Walk 25 feet or more    Education   Education Provided Mobility training;Assistive device   Patient Demonstrates acceptance/verbal understanding;Demonstrates verbal understanding   End of Consult   Patient Position at End of Consult Bedside chair;Bed/Chair alarm activated;All needs within reach         The patient's AM-PAC Basic Mobility Inpatient Short Form Raw Score is 16. A Raw score of less than or equal to 16 suggests the patient may benefit from discharge to post-acute rehabilitation services. Please also refer to the recommendation of the Physical Therapist for safe discharge planning.    Pt will continue to benefit from skilled inpatient PT during this admission in order to facilitate progress towards goals and to maximize pt's independence w/ functional mobility.    DC rec: level II (Moderate Rehab Resource Intensity)      Carito Rosado, PT, DPT  05/02/24

## 2024-05-02 NOTE — PLAN OF CARE
Problem: PHYSICAL THERAPY ADULT  Goal: Performs mobility at highest level of function for planned discharge setting.  See evaluation for individualized goals.  Description: Treatment/Interventions: Functional transfer training, LE strengthening/ROM, Therapeutic exercise, Endurance training, Cognitive reorientation, Patient/family training, Bed mobility, Equipment eval/education, Gait training  Equipment Recommended:  (pending trials SPC vs RW)       See flowsheet documentation for full assessment, interventions and recommendations.  Outcome: Progressing  Note: Prognosis: Good  Problem List: Decreased strength, Decreased endurance, Impaired balance, Decreased mobility, Decreased cognition, Decreased safety awareness, Impaired judgement  Assessment: PT treatment provided today to address deficits of: impaired balance, decreased activity tolerance, gait deviations. Interventions provided include: bed mobility, multiple transfer and gait trials w/ RW in order to challenge pt's activity tolerance and to maximize pt independence w/ functional mobility during current admission w/ acute metabolic encephalopathy. Compared to previous session, pt w/ increased overall activity tolerance. Pt demonstrated ability to perform multiple transfer and gait trials w/ supervision-min Ax1. Education provided to pt on RW management. Pt continues to be functioning below baseline level, and remains limited in functional mobility due to impaired cognition, impaired balance, gait deviations. Pt continues to require Ax1 for OOB and presents w/ impaired cognition, impaired balance, decreased safety awareness, and limited insight. Pt will continue benefit from PT to promote independence w/ functional mobility and progress towards set goals. Recommend DC w/ level: II (Moderate Rehab Resource Intensity) when medically cleared.  Barriers to Discharge: Decreased caregiver support (pt lives alone)  Barriers to Discharge Comments: lives alone-  confused, fall risk  Rehab Resource Intensity Level, PT: II (Moderate Resource Intensity)    See flowsheet documentation for full assessment.

## 2024-05-02 NOTE — PROGRESS NOTES
"[facility]  Progress Note  Name: Jeffery Keys I  MRN: [id number]  Unit/Bed#: S -01 I Date of Admission: 4/27/2024   Date of Service: 5/2/2024 I Hospital Day: 5    Assessment/Plan   * Acute metabolic encephalopathy  Assessment & Plan  Continues to improve. Cognitive impairment in history and per geriatrics note, dysphoric mood    Plan  Continue Ancef  Delirium precautions  Urinary retention protocol  Discontinue IVF  Start Lexapro 5 mg daily    Abnormality of soft tissue on posterior neck  Assessment & Plan  Patient with approximately 8x6 cm soft tissue lump on right posterior neck with overlying 1.5 cm round cystic nodule. Per patient and friend Cindy, he has had this \"lump\" on his neck for a long time. Unclear if referring to cyst or larger mass below    CT neck:   No neck abscess, as clinically questioned.  7.0 cm subcutaneous lipoma in right posterior paramidline neck. Given internal wispy fat heterogeneity, atypical lipomatous tumor in the differential.  1.3 cm subcutaneous lesion in right posterior upper neck laterally adjacent to the fatty lesion, likely sebaceous/epidermal inclusion cyst.  No suspicious cervical lymphadenopathy.    Plan  Outpatient follow-up general surgery/ENT    Bacteremia  Assessment & Plan  Blood culture x1 showing GNR, Klebsiella    Continue Ancef  Procal and WBC trending down    Cognitive impairment  Assessment & Plan  Start Lexapro 5 mg    UTI (urinary tract infection)  Assessment & Plan  CT CAP: diffuse bladder wall thickening, pericystic inflammatory change.  UA: 1-2 WBC, occasional bacteria  Urine culture no growth  Continue Ancef    Ambulatory dysfunction  Assessment & Plan  PT/OT. Uses cane at baseline    HTN (hypertension), benign  Assessment & Plan  Prn hydralazine for SBP >180    DM2 (diabetes mellitus, type 2) ([facility])  Assessment & Plan  Lab Results   Component Value Date    HGBA1C 7.5 (H) 04/27/2024     Increase Levemir to 30 units HS and " "Humalog 7 units TID  Accucheck, SSI    Sepsis (HCC)  Assessment & Plan  Improving  Ancef based on susceptibilities               VTE Pharmacologic Prophylaxis: VTE Score: 5 High Risk (Score >/= 5) - Pharmacological DVT Prophylaxis Ordered: heparin. Sequential Compression Devices Ordered.    Mobility:   Basic Mobility Inpatient Raw Score: 18  JH-HLM Goal: 6: Walk 10 steps or more  JH-HLM Achieved: 5: Stand (1 or more minutes)  JH-HLM Goal NOT achieved. Continue with multidisciplinary rounding and encourage appropriate mobility to improve upon JH-HLM goals.    Patient Centered Rounds: I performed bedside rounds with nursing staff today.  Discussions with Specialists or Other Care Team Provider: Geriatrics    Education and Discussions with Family / Patient:  will call.     Current Length of Stay: 5 day(s)  Current Patient Status: Inpatient   Discharge Plan: Anticipate discharge tomorrow to rehab facility.    Code Status: Level 3 - DNAR and DNI    Subjective:   Patient states he feels worse today but could not elaborate. States some abdominal bloating, but no pain. Will start simethicone. Denies fever, chills, N/V. States some \"black\" loose stool, however, normal soft brown BM reported and charted by nursing. Hb stable. Patient reluctantly agreeable to [facility] but not his preferred facility due to prior experiences.    Objective:     Vitals:   Temp (24hrs), Av.2 °F (36.8 °C), Min:97.4 °F (36.3 °C), Max:99.5 °F (37.5 °C)    Temp:  [97.4 °F (36.3 °C)-99.5 °F (37.5 °C)] 97.9 °F (36.6 °C)  HR:  [81-85] 85  Resp:  [18-21] 21  BP: (137-147)/(85-89) 138/89  SpO2:  [97 %-99 %] 98 %  Body mass index is 27.19 kg/m².     Input and Output Summary (last 24 hours):     Intake/Output Summary (Last 24 hours) at 2024 1023  Last data filed at 2024 0330  Gross per 24 hour   Intake 180 ml   Output 550 ml   Net -370 ml       Physical Exam:   Physical Exam  Constitutional:       General: He is not in acute distress.     " Appearance: Normal appearance. He is ill-appearing. He is not diaphoretic.   HENT:      Mouth/Throat:      Mouth: Mucous membranes are moist.      Pharynx: No oropharyngeal exudate.   Eyes:      General: No scleral icterus.     Conjunctiva/sclera: Conjunctivae normal.   Cardiovascular:      Rate and Rhythm: Normal rate and regular rhythm.      Pulses: Normal pulses.      Heart sounds: No murmur heard.  Pulmonary:      Effort: Pulmonary effort is normal. No respiratory distress.   Abdominal:      General: Bowel sounds are normal. There is distension.      Palpations: Abdomen is soft.      Tenderness: There is no abdominal tenderness. There is no guarding or rebound.   Musculoskeletal:      Cervical back: Neck supple. No tenderness.      Right lower leg: No edema.      Left lower leg: No edema.   Lymphadenopathy:      Cervical: No cervical adenopathy.   Skin:     General: Skin is warm.      Coloration: Skin is not jaundiced.   Neurological:      Mental Status: He is alert. Mental status is at baseline.   Psychiatric:      Comments: Clear thought processes, mood seems depressed          Additional Data:     Labs:  Results from last 7 days   Lab Units 05/02/24  0615 05/01/24 0438 04/30/24  0622   WBC Thousand/uL 15.96*   < > 17.99*   HEMOGLOBIN g/dL 17.3*   < > 18.3*   HEMATOCRIT % 48.5   < > 51.6*   PLATELETS Thousands/uL 195   < > 200   BANDS PCT %  --   --  3   SEGS PCT % 84*   < >  --    LYMPHO PCT % 7*   < > 6*   MONO PCT % 8   < > 8   EOS PCT % 0   < > 0    < > = values in this interval not displayed.     Results from last 7 days   Lab Units 05/02/24  0615 05/01/24  0438   SODIUM mmol/L 137 139   POTASSIUM mmol/L 3.5 3.9   CHLORIDE mmol/L 105 104   CO2 mmol/L 22 25   BUN mg/dL 26* 28*   CREATININE mg/dL 0.98 0.93   ANION GAP mmol/L 10 10   CALCIUM mg/dL 8.7 8.5   ALBUMIN g/dL  --  3.5   TOTAL BILIRUBIN mg/dL  --  1.66   ALK PHOS U/L  --  80   ALT U/L  --  38   AST U/L  --  28   GLUCOSE RANDOM mg/dL 204* 233*      Results from last 7 days   Lab Units 04/27/24 1922   INR  1.08     Results from last 7 days   Lab Units 05/02/24  0806 05/01/24  2135 05/01/24  1645 05/01/24  1113 05/01/24  0740 04/30/24  2107 04/30/24  1703 04/30/24  1142 04/30/24  0718 04/29/24  2057 04/29/24  1608 04/29/24  1039   POC GLUCOSE mg/dl 245* 283* 304* 271* 245* 304* 356* 316* 183* 255* 306* 242*     Results from last 7 days   Lab Units 04/27/24 1922   HEMOGLOBIN A1C % 7.5*     Results from last 7 days   Lab Units 05/02/24  0615 05/01/24  0438 04/30/24  0622 04/29/24  0616 04/27/24 1922   LACTIC ACID mmol/L  --   --   --   --  1.6   PROCALCITONIN ng/ml 0.40* 0.35* 0.53* 0.76* 1.05*       Lines/Drains:  Invasive Devices       Peripheral Intravenous Line  Duration             Peripheral IV 04/27/24 Right Antecubital 4 days              Drain  Duration             External Urinary Catheter Medium 4 days                          Imaging: No pertinent imaging reviewed.    Recent Cultures (last 7 days):   Results from last 7 days   Lab Units 04/27/24 1953 04/27/24 1922   BLOOD CULTURE   --  No Growth After 4 Days.  Klebsiella pneumoniae*   GRAM STAIN RESULT   --  Gram negative rods*   URINE CULTURE  No Growth <1000 cfu/mL  --        Last 24 Hours Medication List:   Current Facility-Administered Medications   Medication Dose Route Frequency Provider Last Rate    acetaminophen  975 mg Oral Q8H PRN Kenny Hawkins MD      aspirin  81 mg Oral Daily DELILAH Spain      cefazolin  2,000 mg Intravenous Q8H Kenny Hawkins MD 2,000 mg (05/02/24 0218)    escitalopram  5 mg Oral Daily Kenny Hawkins MD      heparin (porcine)  5,000 Units Subcutaneous Q8H [facility] DELILAH Spain      hydrALAZINE  10 mg Intravenous Q8H PRN Genaro Tian MD      insulin detemir  30 Units Subcutaneous HS Kenny Hawkins MD      insulin lispro  1-5 Units Subcutaneous 4x Daily (AC & HS) Kenny Hawkins MD      insulin lispro  7 Units Subcutaneous TID With Meals Kenny Hawkins MD      latanoprost  1  drop Both Eyes HS DELILAH Spain      OLANZapine  2.5 mg Oral Once PRN Shaunna Rodriguez MD      pravastatin  40 mg Oral Daily With Dinner DELILAH Spain      simethicone  80 mg Oral 4x Daily (with meals and at bedtime) Kenny Hawkins MD      tamsulosin  0.4 mg Oral Daily DELILAH Spain          Today, Patient Was Seen By: Kenny Hawkins MD    **Please Note: This note may have been constructed using a voice recognition system.**

## 2024-05-02 NOTE — ACP (ADVANCE CARE PLANNING)
"Advanced Care Planning Progress Note    Serious Illness Conversation    1. What is your understanding now of where you are with your illness?  Prognostic Understanding: appropriate understanding of prognosis  Patient has no clear terminal diagnosis, but insists that he would not treatment or hospitalization if he were found in the same condition he was prior to his current hospitalization.      2. How much information about what is likely to be ahead with your illness would you like to have?  Information: patient wants to be fully informed     3. What did you (clinician) communicate to the patient?  Prognostic Communication: Uncertain - It can be difficult to predict what will happen with your illness. I hope you will continue to live well for a long time but I’m worried that you could get sick quickly, and I think it is important to prepare for that possibility.     4. If your health situation worsens, what are your most important goals?  Goals: be at home, be physically comfortable, have my medical decisions respected  He is a little upset that his wishes were not followed through as he distinctly remembers filling out a POLST where he clarified his wishes.  I did explain that it can be difficult to interpret how best to act in the scenario that lead to his hospitalization as he was encephalopathic due to a UTI.      5. What are the biggest fears and worries about the future and your health?     6. What abilities are so critical to your life that you cannot imagine living without them?  Has no specific fears about dying. States he \"lead a good life\" and sees no reason to \"keep him alive.\"      7. What gives you strength as you think about the future with your illness?     8. If you become sicker, how much are you willing to go through for the possibility of gaining more time?  Be in the hospital: No Have a feeding tube: No   Be in the ICU: No Live in a nursing home: No   Be on a ventilator: No Be uncomfortable: No "   Be on dialysis: No Undergo aggressive test and/or procedures: No   9. How much does your proxy and family know about your priorities and wishes?  Pt is not in contact with his adult children and relies on a friend in his apartment complex.      I’ve heard you say that being comfortable and allowed to die naturally is really important to you. Keeping that in mind, and what we know about your illness, I recommend that we keep your POLST the same but clearly state your desire to NOT BE HOSPITALIZED in the future. This will help us make sure that your treatment plans reflect what’s important to you.     How does this plan sound to you? I will do everything I can to help you through this.  Patient verbalized understanding of the plan     I have spent 25 minutes speaking with my patient on advanced care planning today or during this visit     Advanced directives         Ganesh Frazier MD

## 2024-05-02 NOTE — ASSESSMENT & PLAN NOTE
Continues to improve. Cognitive impairment in history and per geriatrics note, dysphoric mood    Plan  Continue Ancef  Delirium precautions  Urinary retention protocol  Discontinue IVF  Start Lexapro 5 mg daily

## 2024-05-02 NOTE — CASE MANAGEMENT
Case Management Progress Note    Patient name Jeffery Mendoza S /S -01 MRN [id number]  : 1935 Date 5/3/2024       LOS (days): 6  Geometric Mean LOS (GMLOS) (days): 5.1  Days to GMLOS:-0.4        OBJECTIVE:        Current admission status: Inpatient  Preferred Pharmacy:   [id number] [place] [facility] [address] [address] [address] [facility] [place] [address]  Phone: [phone] Fax: [phone]    Primary Care Provider: DELILAH Mcpherson    Primary Insurance: MEDICARE  Secondary Insurance: AARP    PROGRESS NOTE:    Patient making passive suicidal statements earlier today. Same relayed to [facility] via [facility] and they requested psych consult be forwarded to confirm patient clear from psychiatric standpoint prior to admission. Psych consult attached to referral in AIDIN; will follow-up with facility in AM re: same.    Time spent with patient to discuss anticipated d/c to rehab, now that he's more alert/oriented. Patient aware of plan to go to [facility] at discharge and reports he's been there a few times in the past. Patient also aware that [facility] had requested that facility, as it's the easiest for him to get to. Patient understanding of same. Shared information about his family/past and relays limited supports outside of Cindy. Active listening and emotional support provided. Patient aware this writer to follow-up in AM re: anticipated rehab transfer.

## 2024-05-02 NOTE — CONSULTS
"Psychiatry Consultation Note   Jeffery Keys 88 y.o. male MRN: [id number]  Unit/Bed#: S -01 Encounter: [id number]      Assessment and Plan     Assessment       Assessment:    Jeffery Keys is a 88 y.o. male, , , with no pertinent past psychiatric history who was admitted to [facility] on 4/27/2024 due to bacteremia and UTI. Psychiatric consultation was requested for management of passive SI. On initial psychiatric evaluation, Jeffery is seated in chair, watching TV. Patient reports he has been hospitalized 2x in the past year and is concerned regarding his current infection, stating \"I don't know what the hell's wrong with me\". Patient denies feeling depressed but reports he does not feel he has much to live for at 89 yo. He further states \"I've been alone so long I don't give a crap anymore\" and \"nobody wants me\", and shares limited relationship with his children. Patient reports he still enjoys watching TV and and looks forward to watching wrestling at night. Denies sleep disturbances or appetite changes at this time. Denies suicidal ideation, intent, or plan.    Principal Psychiatric Problem:  Unspecified mood disorder (HCC)  Consideration for adjustment disorder    Principal Problem:    Acute metabolic encephalopathy  Active Problems:    Sepsis (HCC)    DM2 (diabetes mellitus, type 2) (HCC)    HTN (hypertension), benign    Ambulatory dysfunction    UTI (urinary tract infection)    Bacteremia    Abnormality of soft tissue on posterior neck    Leukocytosis    Polycythemia    Cognitive impairment    Dysphoric mood      Plan     Plan:   Discussed with primary team, with the following recommendations:    Treatment Recommendations:  No indication for inpatient psychiatry at this time.  Medical management per primary team, no new labs indicated at this time  Pharmacological:   No psychopharmacologic changes at this time.  Continue Lexapro 5 mg QD  Observation level: Routine  Psychiatry will " "continue to follow as needed. Please contact our service via [employer] with any additional questions or concerns. If contacting after hours, please call or TigerText the on-call team (EN: [phone]) with any questions or concerns.    Risks, benefits and possible side effects of Medications: No new medications at this time.     HPI   History of Present Illness   Physician Requesting Consult: Ganesh Frazier MD  Reason for Consult / Principal Problem: \"passive SI\"    Chief Complaint: \"I just don't give a damn anymore\"    Jeffery Keys is a 88 y.o. male, , , with no pertinent past psychiatric history who was admitted to [facility] on 4/27/2024 due to bacteremia and UTI. Psychiatric consultation was requested for management of passive SI.    On initial psychiatric evaluation, Jeffery is seated in chair, watching TV. Patient reports he has been hospitalized 2x in the past year and is concerned regarding his current infection, stating \"I don't know what the hell's wrong with me\". Patient denies feeling depressed but reports he does not feel he has much to live for at 87 yo. He further states \"I've been alone so long I don't give a crap anymore\" and \"nobody wants me\", and shares limited relationship with his children. Patient reports he still enjoys watching TV and and looks forward to watching wrestling at night. Denies sleep disturbances or appetite changes at this time.     Psychiatric ROS and PMHx     Psychiatric Review Of Systems:  Sleep: Denies  Interest/Anhedonia: Yes - \"I just dont give a damn anymore\"; watches wrestling at night  Guilt/hopeless: Yes  Low energy/anergy: Yes, decreased but able to keep house clean  Poor Concentration: Denies  Appetite changes: Denies  Anxiety/panic: Yes, regarding his infection  Jarad:  no over s/s of jarad; denies   Self injurious behavior/risky behavior: no    Suicidal ideation: passive death wishes as above and \"what the hells the reason for living\". He " "denies suicidal ideation, intent or plan  Homicidal ideation: no  Auditory hallucinations:  denies  Visual hallucinations:  denies  Delusional thinking: no    Historical Information     Past Psychiatric History:   Past Inpatient Psychiatric Treatment:   No history of past inpatient psychiatric admissions  Past Outpatient Psychiatric Treatment:    No history of past outpatient psychiatric treatment  Past Suicide Attempts: gesture with gun in 20s when wife was reportedly having affairs  Past Psychiatric Medication Trials: none    Substance Abuse History:  Social History       Tobacco History       Smoking Status  Former Smoking Tobacco Type  Cigarettes      Smokeless Tobacco Use  Never      Tobacco Comments  smoked since age 18 per Rayne              Alcohol History       Alcohol Use Status  No              Drug Use       Drug Use Status  No              Sexual Activity       Sexually Active  Not Currently              Activities of Daily Living    Not Asked                   I have assessed this patient for substance use within the past 12 monthspreviously alcohol abuse (every night was GILLIAN\") but stopped \"years\" ago; ex smoker 3 ppds over 20 years ago    Family Psychiatric History:   Psychiatric Illness:  no family history of psychiatric illness  Substance Abuse:  2 uncles with alcohol use  No known SA in family    Social History:  Marital History:   Children:  3 kids, limited relationship with kids  Living Arrangement: lives alone  Occupational History:  used to work in [employer]  doing  work  Functioning Relationships: poor support system  Legal History: none   History: None  Access to Firearms: denies - got rid of all them due to finances    Traumatic History:   Abuse: unknown  Other Traumatic Events: death of eldest daughter     Past Medical History:  History of Seizures: denies  History of Head injury with loss of consciousness:  denies    Past Medical History:   Diagnosis Date    Cardiac " "disease     Diabetes mellitus (HCC)     Hyperlipidemia     Hypertension     Hyperthyroidism      Past Surgical History:   Procedure Laterality Date    CHOLECYSTECTOMY      HERNIA REPAIR         Mental Status Evaluation and Medical ROS     Medical Review Of Systems:  Pertinent items are noted in HPI.    Meds/Allergies   All current active medications have been reviewed.  No Known Allergies    Objective   Vital signs in last 24 hours:  Temp:  [97.4 °F (36.3 °C)-99.5 °F (37.5 °C)] 98 °F (36.7 °C)  HR:  [71-85] 71  Resp:  [18-21] 18  BP: (137-147)/(74-89) 138/74      Intake/Output Summary (Last 24 hours) at 5/2/2024 1606  Last data filed at 5/2/2024 0330  Gross per 24 hour   Intake --   Output 350 ml   Net -350 ml       Mental Status Evaluation:  Appearance:  age appropriate, dressed appropriately, looks stated age   Behavior:  pleasant, cooperative, calm   Speech:  normal rate and volume, clear, coherent   Mood:  \"Don't give a damn\"   Affect:  constricted   Language: naming objects   Thought Process:  circumstantial   Associations: circumstantial associations   Thought Content:  no overt delusions   Perceptual Disturbances: denies auditory or visual hallucinations when asked, does not appear responding to internal stimuli   Risk Potential: Suicidal ideation -  passive death wishes; denies suicidal ideation, plan or intent  Homicidal ideation - None at present  Potential for aggression - Not at present   Sensorium:  oriented to person and situation   Memory:  recent and remote memory grossly intact   Consciousness:  alert and awake   Attention/Concentration: attention span and concentration may be shorter than expected for age   Intellect: within normal limits   Insight:  fair   Judgment: fair   Muscle Strength Muscle Tone: Moving all 4 extremities spontaneously   Gait/Station: In chair   Motor Activity: no abnormal movements     Laboratory Results: I have personally reviewed all pertinent laboratory/tests " results    Results from the past 24 hours:   Recent Results (from the past 24 hour(s))   Fingerstick Glucose (POCT)    Collection Time: 05/01/24  4:45 PM   Result Value Ref Range    POC Glucose 304 (H) 65 - 140 mg/dl   Fingerstick Glucose (POCT)    Collection Time: 05/01/24  9:35 PM   Result Value Ref Range    POC Glucose 283 (H) 65 - 140 mg/dl   CBC and differential    Collection Time: 05/02/24  6:15 AM   Result Value Ref Range    WBC 15.96 (H) 4.31 - 10.16 Thousand/uL    RBC 5.40 3.88 - 5.62 Million/uL    Hemoglobin 17.3 (H) 12.0 - 17.0 g/dL    Hematocrit 48.5 36.5 - 49.3 %    MCV 90 82 - 98 fL    MCH 32.0 26.8 - 34.3 pg    MCHC 35.7 31.4 - 37.4 g/dL    RDW 13.1 11.6 - 15.1 %    MPV 10.7 8.9 - 12.7 fL    Platelets 195 149 - 390 Thousands/uL    nRBC 0 /100 WBCs    Segmented % 84 (H) 43 - 75 %    Immature Grans % 1 0 - 2 %    Lymphocytes % 7 (L) 14 - 44 %    Monocytes % 8 4 - 12 %    Eosinophils Relative 0 0 - 6 %    Basophils Relative 0 0 - 1 %    Absolute Neutrophils 13.35 (H) 1.85 - 7.62 Thousands/µL    Absolute Immature Grans 0.21 (H) 0.00 - 0.20 Thousand/uL    Absolute Lymphocytes 1.05 0.60 - 4.47 Thousands/µL    Absolute Monocytes 1.24 (H) 0.17 - 1.22 Thousand/µL    Eosinophils Absolute 0.04 0.00 - 0.61 Thousand/µL    Basophils Absolute 0.07 0.00 - 0.10 Thousands/µL   Procalcitonin    Collection Time: 05/02/24  6:15 AM   Result Value Ref Range    Procalcitonin 0.40 (H) <=0.25 ng/ml   Magnesium    Collection Time: 05/02/24  6:15 AM   Result Value Ref Range    Magnesium 1.9 1.9 - 2.7 mg/dL   Basic metabolic panel    Collection Time: 05/02/24  6:15 AM   Result Value Ref Range    Sodium 137 135 - 147 mmol/L    Potassium 3.5 3.5 - 5.3 mmol/L    Chloride 105 96 - 108 mmol/L    CO2 22 21 - 32 mmol/L    ANION GAP 10 4 - 13 mmol/L    BUN 26 (H) 5 - 25 mg/dL    Creatinine 0.98 0.60 - 1.30 mg/dL    Glucose 204 (H) 65 - 140 mg/dL    Calcium 8.7 8.4 - 10.2 mg/dL    eGFR 69 ml/min/1.73sq m   Fingerstick Glucose (POCT)     "Collection Time: 05/02/24  8:06 AM   Result Value Ref Range    POC Glucose 245 (H) 65 - 140 mg/dl   Fingerstick Glucose (POCT)    Collection Time: 05/02/24 11:46 AM   Result Value Ref Range    POC Glucose 320 (H) 65 - 140 mg/dl     Admission Labs:   No results displayed because visit has over 200 results.        Drug Screen: No results found for: \"AMPMETHUR\", \"BARBTUR\", \"BDZUR\", \"THCUR\", \"COCAINEUR\", \"METHADONEUR\", \"OPIATEUR\", \"PCPUR\", \"ECSTASYUR\"    Imaging Studies: CT soft tissue neck w contrast    Result Date: 4/29/2024  Narrative: CT NECK WITH CONTRAST INDICATION:   evalulate for abscess posterior neck. COMPARISON: CT head without contrast and CT chest abdomen pelvis with contrast 4/27/2024. TECHNIQUE:  Axial, sagittal, and coronal 2D reformatted images were created from the axial source data and submitted for interpretation. Radiation dose length product (DLP) for this visit:  679 mGy-cm .  This examination, like all CT scans performed in the [facility], was performed utilizing techniques to minimize radiation dose exposure, including the use of iterative reconstruction and automated exposure control. IV Contrast:  85 mL of iohexol (OMNIPAQUE) IMAGE QUALITY:  Diagnostic. FINDINGS: VISUALIZED BRAIN PARENCHYMA:  No acute intracranial pathology of the visualized brain parenchyma. VISUALIZED ORBITS: Sequela of bilateral cataract surgery. PARANASAL SINUSES: Normal visualized paranasal sinuses. NASAL CAVITY AND NASOPHARYNX:  Normal. SUPRAHYOID AND INFRAHYOID NECK: 4.3 x 2.9 x 7.0 cm subcutaneous fatty lesion with some internal wispy heterogeneity in right posterior paramidline neck (2:61, 602:55), compatible with lipoma. 1.3 cm subcutaneous lesion in right posterior upper neck laterally adjacent to the fatty lesion (2:58), likely sebaceous/epidermal inclusion cyst. 1.5 cm lipoma in left anterolateral oral tongue (2:51). Normal tongue base, tonsillar fossa and epiglottis. Bilateral aryepiglottic folds " are normal. Small amount of secretions in hypopharynx extending into bilateral piriform sinuses.  Normal glottis and subglottic airway. THYROID GLAND:  Unremarkable. PAROTID AND SUBMANDIBULAR GLANDS:  Normal. LYMPH NODES:  No pathologic or enlarged adenopathy. VASCULAR STRUCTURES:  Normal enhancement of the cervical vasculature. Mild scattered calcified atherosclerotic disease. THORACIC INLET:  Lung apices and upper mediastinum are unremarkable. Please see recent CT chest abdomen pelvis with contrast dated 4/27/2024 for further evaluation. BONY STRUCTURES: No acute fracture or destructive osseous lesion. Mild spinal degenerative changes.     Impression: No neck abscess, as clinically questioned. 7.0 cm subcutaneous lipoma in right posterior paramidline neck. Given internal wispy fat heterogeneity, atypical lipomatous tumor in the differential. 1.3 cm subcutaneous lesion in right posterior upper neck laterally adjacent to the fatty lesion, likely sebaceous/epidermal inclusion cyst. No suspicious cervical lymphadenopathy. Additional chronic/incidental findings as detailed above. The study was marked in EPIC for immediate notification. Workstation performed: [id number]     XR chest 1 view portable    Result Date: 4/28/2024  Narrative: XR CHEST PORTABLE INDICATION: suspected infection. COMPARISON: Chest and abdomen CT 4/27/2024. FINDINGS: Clear lungs. No pneumothorax or pleural effusion. Normal cardiomediastinal silhouette. Bones are unremarkable for age. Subcutaneous nodules inferior lateral right chest wall. Upper abdomen normal. Cholecystectomy.     Impression: No acute cardiopulmonary disease. See subsequent chest and abdomen CT. Workstation performed: [id number]     CT chest abdomen pelvis w contrast    Result Date: 4/27/2024  Narrative: CT CHEST, ABDOMEN AND PELVIS WITH IV CONTRAST INDICATION: . COMPARISON: None. TECHNIQUE: CT examination of the chest, abdomen and pelvis was performed. Multiplanar 2D reformatted  images were created from the source data. This examination, like all CT scans performed in the [facility], was performed utilizing techniques to minimize radiation dose exposure, including the use of iterative reconstruction and automated exposure control. Radiation dose length product (DLP) for this visit: 615 mGy-cm IV Contrast: 100 mL of iohexol (OMNIPAQUE) Enteric Contrast: Not administered. FINDINGS: CHEST LUNGS: Lungs are clear. No tracheal or endobronchial lesion. PLEURA: Unremarkable. HEART/GREAT VESSELS: Heavy atherosclerotic coronary artery calcification. No thoracic aortic aneurysm. MEDIASTINUM AND [place]: Unremarkable. CHEST WALL AND LOWER NECK: Unremarkable. ABDOMEN LIVER/BILIARY TREE: Hepatic steatosis. No suspicious mass. Normal hepatic contours. No biliary dilation. GALLBLADDER: Post cholecystectomy. SPLEEN: Unremarkable. PANCREAS: Unremarkable. ADRENAL GLANDS: Unremarkable. KIDNEYS/URETERS: Unremarkable. No hydronephrosis. STOMACH AND BOWEL: Unremarkable. APPENDIX: No findings to suggest appendicitis. ABDOMINOPELVIC CAVITY: No ascites. No pneumoperitoneum. No lymphadenopathy. VESSELS: Unremarkable for patient's age. PELVIS REPRODUCTIVE ORGANS: Diffuse bladder wall thickening and pericystic inflammatory change. URINARY BLADDER: Unremarkable. ABDOMINAL WALL/INGUINAL REGIONS: Postsurgical changes at the bilateral inguinal regions. BONES: No acute fracture or suspicious osseous lesion.     Impression: No evidence of acute intrathoracic pathology. Findings consistent with cystitis Workstation performed: [id number]     CT head without contrast    Result Date: 4/27/2024  Narrative: CT BRAIN - WITHOUT CONTRAST INDICATION:   febrile, altered - no clear source. COMPARISON:  None. TECHNIQUE:  CT examination of the brain was performed.  Multiplanar 2D reformatted images were created from the source data. Radiation dose length product (DLP) for this visit:  1081 mGy-cm .  This examination, like  all CT scans performed in the [facility], was performed utilizing techniques to minimize radiation dose exposure, including the use of iterative reconstruction and automated exposure control. IMAGE QUALITY:  Diagnostic. FINDINGS: PARENCHYMA: Decreased attenuation is noted in periventricular and subcortical white matter demonstrating an appearance that is statistically most likely to represent moderate microangiopathic change. No CT signs of acute infarction.  No intracranial mass, mass effect or midline shift.  No acute parenchymal hemorrhage. VENTRICLES AND EXTRA-AXIAL SPACES:  Ventricles and extra-axial CSF spaces are prominent commensurate with the degree of volume loss.  No hydrocephalus.  No acute extra-axial hemorrhage. VISUALIZED ORBITS: Normal visualized orbits. PARANASAL SINUSES: Normal visualized paranasal sinuses. CALVARIUM AND EXTRACRANIAL SOFT TISSUES:  Normal.     Impression: No acute intracranial abnormality.  Chronic microangiopathic changes. Workstation performed: [id number]     EKG: XWe=185 ms on 04/27/24    Code Status: Level 3 - DNAR and DNI  Advance Directive and Living Will:       Power of :      Kyra Hernandez DO 05/02/24  Psychiatry Resident, PGY-II    This note was completed in part utilizing [employer] Software. Grammatical, translation, syntax errors, random word insertions, spelling mistakes, and incomplete sentences may be an occasional consequence of this system secondary to software limitations with voice recognition, ambient noise, and hardware issues. If you have any questions or concerns about the content, text, or information contained within the body of this dictation, please contact the provider for clarification.

## 2024-05-03 VITALS
SYSTOLIC BLOOD PRESSURE: 142 MMHG | TEMPERATURE: 97.7 F | BODY MASS INDEX: 27.19 KG/M2 | DIASTOLIC BLOOD PRESSURE: 72 MMHG | RESPIRATION RATE: 20 BRPM | OXYGEN SATURATION: 93 % | WEIGHT: 178.79 LBS | HEART RATE: 69 BPM

## 2024-05-03 PROBLEM — A41.9 SEPSIS (HCC): Status: RESOLVED | Noted: 2017-08-05 | Resolved: 2024-05-03

## 2024-05-03 PROBLEM — G93.41 ACUTE METABOLIC ENCEPHALOPATHY: Status: RESOLVED | Noted: 2024-04-27 | Resolved: 2024-05-03

## 2024-05-03 LAB
ANION GAP SERPL CALCULATED.3IONS-SCNC: 5 MMOL/L (ref 4–13)
BACTERIA BLD CULT: NORMAL
BASOPHILS # BLD AUTO: 0.05 THOUSANDS/ÂΜL (ref 0–0.1)
BASOPHILS NFR BLD AUTO: 0 % (ref 0–1)
BUN SERPL-MCNC: 20 MG/DL (ref 5–25)
CALCIUM SERPL-MCNC: 8.4 MG/DL (ref 8.4–10.2)
CHLORIDE SERPL-SCNC: 107 MMOL/L (ref 96–108)
CO2 SERPL-SCNC: 26 MMOL/L (ref 21–32)
CREAT SERPL-MCNC: 0.74 MG/DL (ref 0.6–1.3)
EOSINOPHIL # BLD AUTO: 0.13 THOUSAND/ÂΜL (ref 0–0.61)
EOSINOPHIL NFR BLD AUTO: 1 % (ref 0–6)
ERYTHROCYTE [DISTWIDTH] IN BLOOD BY AUTOMATED COUNT: 12.9 % (ref 11.6–15.1)
GFR SERPL CREATININE-BSD FRML MDRD: 83 ML/MIN/1.73SQ M
GLUCOSE SERPL-MCNC: 102 MG/DL (ref 65–140)
GLUCOSE SERPL-MCNC: 117 MG/DL (ref 65–140)
GLUCOSE SERPL-MCNC: 179 MG/DL (ref 65–140)
HCT VFR BLD AUTO: 42.2 % (ref 36.5–49.3)
HGB BLD-MCNC: 15.3 G/DL (ref 12–17)
IMM GRANULOCYTES # BLD AUTO: 0.24 THOUSAND/UL (ref 0–0.2)
IMM GRANULOCYTES NFR BLD AUTO: 2 % (ref 0–2)
LYMPHOCYTES # BLD AUTO: 0.86 THOUSANDS/ÂΜL (ref 0.6–4.47)
LYMPHOCYTES NFR BLD AUTO: 6 % (ref 14–44)
MAGNESIUM SERPL-MCNC: 1.8 MG/DL (ref 1.9–2.7)
MCH RBC QN AUTO: 32.2 PG (ref 26.8–34.3)
MCHC RBC AUTO-ENTMCNC: 36.3 G/DL (ref 31.4–37.4)
MCV RBC AUTO: 89 FL (ref 82–98)
MONOCYTES # BLD AUTO: 1.13 THOUSAND/ÂΜL (ref 0.17–1.22)
MONOCYTES NFR BLD AUTO: 8 % (ref 4–12)
NEUTROPHILS # BLD AUTO: 12.37 THOUSANDS/ÂΜL (ref 1.85–7.62)
NEUTS SEG NFR BLD AUTO: 83 % (ref 43–75)
NRBC BLD AUTO-RTO: 0 /100 WBCS
PLATELET # BLD AUTO: 161 THOUSANDS/UL (ref 149–390)
PMV BLD AUTO: 10 FL (ref 8.9–12.7)
POTASSIUM SERPL-SCNC: 3.5 MMOL/L (ref 3.5–5.3)
PROCALCITONIN SERPL-MCNC: 0.35 NG/ML
RBC # BLD AUTO: 4.75 MILLION/UL (ref 3.88–5.62)
SODIUM SERPL-SCNC: 138 MMOL/L (ref 135–147)
WBC # BLD AUTO: 14.78 THOUSAND/UL (ref 4.31–10.16)

## 2024-05-03 PROCEDURE — 82948 REAGENT STRIP/BLOOD GLUCOSE: CPT

## 2024-05-03 PROCEDURE — 99239 HOSP IP/OBS DSCHRG MGMT >30: CPT | Performed by: HOSPITALIST

## 2024-05-03 PROCEDURE — 83735 ASSAY OF MAGNESIUM: CPT | Performed by: PHARMACIST

## 2024-05-03 PROCEDURE — 85025 COMPLETE CBC W/AUTO DIFF WBC: CPT | Performed by: PHARMACIST

## 2024-05-03 PROCEDURE — 84145 PROCALCITONIN (PCT): CPT | Performed by: PHARMACIST

## 2024-05-03 PROCEDURE — 80048 BASIC METABOLIC PNL TOTAL CA: CPT | Performed by: PHARMACIST

## 2024-05-03 RX ORDER — CEPHALEXIN 500 MG/1
500 CAPSULE ORAL EVERY 6 HOURS SCHEDULED
Status: DISCONTINUED | OUTPATIENT
Start: 2024-05-03 | End: 2024-05-03 | Stop reason: HOSPADM

## 2024-05-03 RX ORDER — INSULIN LISPRO 100 [IU]/ML
7 INJECTION, SOLUTION INTRAVENOUS; SUBCUTANEOUS
Start: 2024-05-03

## 2024-05-03 RX ORDER — SIMETHICONE 80 MG
80 TABLET,CHEWABLE ORAL
Start: 2024-05-03

## 2024-05-03 RX ORDER — ESCITALOPRAM OXALATE 5 MG/1
5 TABLET ORAL DAILY
Start: 2024-05-04

## 2024-05-03 RX ORDER — CEPHALEXIN 500 MG/1
500 CAPSULE ORAL EVERY 6 HOURS SCHEDULED
Start: 2024-05-03 | End: 2024-05-07

## 2024-05-03 RX ORDER — MAGNESIUM SULFATE HEPTAHYDRATE 40 MG/ML
2 INJECTION, SOLUTION INTRAVENOUS ONCE
Status: COMPLETED | OUTPATIENT
Start: 2024-05-03 | End: 2024-05-03

## 2024-05-03 RX ADMIN — INSULIN LISPRO 7 UNITS: 100 INJECTION, SOLUTION INTRAVENOUS; SUBCUTANEOUS at 11:52

## 2024-05-03 RX ADMIN — SIMETHICONE 80 MG: 80 TABLET, CHEWABLE ORAL at 11:51

## 2024-05-03 RX ADMIN — ASPIRIN 81 MG: 81 TABLET, COATED ORAL at 08:23

## 2024-05-03 RX ADMIN — CEPHALEXIN 500 MG: 500 CAPSULE ORAL at 11:51

## 2024-05-03 RX ADMIN — MAGNESIUM SULFATE HEPTAHYDRATE 2 G: 40 INJECTION, SOLUTION INTRAVENOUS at 09:56

## 2024-05-03 RX ADMIN — HEPARIN SODIUM 5000 UNITS: 5000 INJECTION INTRAVENOUS; SUBCUTANEOUS at 05:13

## 2024-05-03 RX ADMIN — TAMSULOSIN HYDROCHLORIDE 0.4 MG: 0.4 CAPSULE ORAL at 08:23

## 2024-05-03 RX ADMIN — CEFAZOLIN SODIUM 2000 MG: 2 SOLUTION INTRAVENOUS at 03:18

## 2024-05-03 RX ADMIN — INSULIN LISPRO 7 UNITS: 100 INJECTION, SOLUTION INTRAVENOUS; SUBCUTANEOUS at 08:23

## 2024-05-03 RX ADMIN — SIMETHICONE 80 MG: 80 TABLET, CHEWABLE ORAL at 08:23

## 2024-05-03 RX ADMIN — INSULIN LISPRO 1 UNITS: 100 INJECTION, SOLUTION INTRAVENOUS; SUBCUTANEOUS at 11:52

## 2024-05-03 RX ADMIN — ESCITALOPRAM OXALATE 5 MG: 10 TABLET ORAL at 08:23

## 2024-05-03 NOTE — CASE MANAGEMENT
Case Management Discharge Planning Note    Patient name Jeffery ZENDEJAS /S -01 MRN [id number]  : 1935 Date 5/3/2024       Current Admission Date: 2024  Current Admission Diagnosis:DM2 (diabetes mellitus, type 2) ([facility])   Patient Active Problem List    Diagnosis Date Noted    Dysphoric mood 2024    Cognitive impairment 2024    Leukocytosis 2024    Polycythemia 2024    Bacteremia 2024    Abnormality of soft tissue on posterior neck 2024    UTI (urinary tract infection) 2024    Syncope 2023    Left inguinal hernia 2023    Ambulatory dysfunction 2023    DM2 (diabetes mellitus, type 2) ([facility]) 2017    HTN (hypertension), benign 2017    HLD (hyperlipidemia) 2017    Epididymo-orchitis 2017      LOS (days): 6  Geometric Mean LOS (GMLOS) (days): 5.1  Days to GMLOS:-0.5     OBJECTIVE:  Risk of Unplanned Readmission Score: 12.67         Current admission status: Inpatient   Preferred Pharmacy:   [id number] [place] [address] [address]  Phone: [phone] Fax: [phone]    Primary Care Provider: DELILAH Mcpherson    Primary Insurance: MEDICARE  Secondary Insurance: AARP    DISCHARGE DETAILS:    Discharge planning discussed with:: patient at bedside; lucio White, by phone  Freedom of Choice: Yes (re: rehab)  Comments - Freedom of Choice: confirmed plan for STR at [facility]  CM contacted family/caregiver?: Yes  Were Treatment Team discharge recommendations reviewed with patient/caregiver?: Yes  Did patient/caregiver verbalize understanding of patient care needs?: N/A- going to facility  Were patient/caregiver advised of the risks associated with not following Treatment Team discharge recommendations?: Yes    Contacts  Patient Contacts: lucio White  Relationship to Patient:: Family  Contact Method: Phone  Phone Number: [phone]  Reason/Outcome:  "Continuity of Care, Emergency Contact, Referral, Discharge Planning    Requested Home Health Care         Is the patient interested in HHC at discharge?: No    DME Referral Provided  Referral made for DME?: No    Other Referral/Resources/Interventions Provided:  Interventions: Short Term Rehab, SNF  Referral Comments: Per SLIM, patient medically clear for d/c today. [facility] aware and confirmed ability to accept today - prefers transport prior to 3:00pm if possible. Call made to Cindy to relay above - Cindy confirmed plan for d/c today to [facility] and reports he will meet patient there. Requesting call with pick-up time once known. W/c transport requested in [facility] for 12:00pm and confirmed with [facility] for the same time. Met with patient at bedside and discussed above. IMM reviewed which he signed; copy provided for his records. Call then made with patient to Cindy, as he wanted to speak with him as he hadn't heard from him in a few days. Spoke with both patient and Cindy to relay pick-up time for 12:00pm and transfer. Cindy did confirm that patient's apartment door has been fixed and that he will assist with making sure his rent/bills are paid while he's at rehab. Patient reports that Cindy is the only support he has, as his sisters - Sarai (lives in [place]) and Megan (\"somewhere in [place]\") are both older than him and they only write back and forth; patient also reports no contact with his children, Elen and Krishna, in many years. Patient in agreement with d/c for today to [facility] and aware that Cindy will be meeting him there. No other d/c needs identified.    Would you like to participate in our [facility] service program?  : No - Declined    Treatment Team Recommendation: Short Term Rehab, SNF  Discharge Destination Plan:: SNF, Short Term Rehab ([facility])  Transport at Discharge : Wheelchair van  Dispatcher Contacted: Yes  Number/Name of Dispatcher: [facility]  Transported by " (Company and Unit #): [facility]  ETA of Transport (Date): 05/03/24  ETA of Transport (Time): 1200 (confirmed)              IMM Given (Date):: 05/03/24  IMM Given to:: Patient  IMM reviewed with patient, patient agrees with discharge determination.         Accepting Facility Name, City & State : [facility]  Receiving Facility/Agency Phone Number: [phone]; ask for 2nd floor charge nurse  Facility/Agency Fax Number: [phone]

## 2024-05-03 NOTE — PLAN OF CARE
Problem: Potential for Falls  Goal: Patient will remain free of falls  Description: INTERVENTIONS:  - Educate patient/family on patient safety including physical limitations  - Instruct patient to call for assistance with activity   - Consult OT/PT to assist with strengthening/mobility   - Keep Call bell within reach  - Keep bed low and locked with side rails adjusted as appropriate  - Keep care items and personal belongings within reach  - Initiate and maintain comfort rounds  - Make Fall Risk Sign visible to staff  - Offer Toileting every 2 Hours, in advance of need  - Initiate/Maintain bed alarm  - Obtain necessary fall risk management equipment: bed alarm  - Apply yellow socks and bracelet for high fall risk patients  - Consider moving patient to room near nurses station  Outcome: Progressing     Problem: SAFETY,RESTRAINT: NV/NON-SELF DESTRUCTIVE BEHAVIOR  Goal: Remains free of harm/injury (restraint for non violent/non self-detsructive behavior)  Description: INTERVENTIONS:  - Instruct patient/family regarding restraint use   - Assess and monitor physiologic and psychological status   - Provide interventions and comfort measures to meet assessed patient needs   - Identify and implement measures to help patient regain control  - Assess readiness for release of restraint   Outcome: Progressing  Goal: Returns to optimal restraint-free functioning  Description: INTERVENTIONS:  - Assess the patient's behavior and symptoms that indicate continued need for restraint  - Identify and implement measures to help patient regain control  - Assess readiness for release of restraint   Outcome: Progressing

## 2024-05-03 NOTE — ASSESSMENT & PLAN NOTE
Lab Results   Component Value Date    HGBA1C 7.5 (H) 04/27/2024     Continue Levemir to 30 units HS and Humalog 7 units TID  D/c metformin

## 2024-05-03 NOTE — ASSESSMENT & PLAN NOTE
Continue Lexapro 5 mg  Follow-up with [facility], referral sent.

## 2024-05-03 NOTE — ASSESSMENT & PLAN NOTE
Continues to improve. Cognitive impairment in history and per geriatrics note, dysphoric mood    Plan  Continue Keflex on discharge for UTI and bacteremia  Continue Lexapro 5 mg daily

## 2024-05-06 ENCOUNTER — TRANSITIONAL CARE MANAGEMENT (OUTPATIENT)
Dept: FAMILY MEDICINE CLINIC | Facility: MEDICAL CENTER | Age: 89
End: 2024-05-06

## 2024-05-08 LAB
ALBUMIN SERPL BCP-MCNC: 3.5 G/DL (ref 3.5–5)
ALP SERPL-CCNC: 80 U/L (ref 34–104)
ALT SERPL W P-5'-P-CCNC: 38 U/L (ref 7–52)
ANION GAP SERPL CALCULATED.3IONS-SCNC: 10 MMOL/L (ref 4–13)
AST SERPL W P-5'-P-CCNC: 28 U/L (ref 13–39)
BILIRUB SERPL-MCNC: 1.66 MG/DL (ref 0.2–1)
BUN SERPL-MCNC: 28 MG/DL (ref 5–25)
CALCIUM SERPL-MCNC: 8.5 MG/DL (ref 8.4–10.2)
CHLORIDE SERPL-SCNC: 104 MMOL/L (ref 96–108)
CO2 SERPL-SCNC: 25 MMOL/L (ref 21–32)
CREAT SERPL-MCNC: 0.93 MG/DL (ref 0.6–1.3)
GFR SERPL CREATININE-BSD FRML MDRD: 74 ML/MIN/1.73SQ M
GLUCOSE SERPL-MCNC: 233 MG/DL (ref 65–140)
POTASSIUM SERPL-SCNC: 3.9 MMOL/L (ref 3.5–5.3)
PROT SERPL-MCNC: 6.2 G/DL (ref 6.4–8.4)
SODIUM SERPL-SCNC: 139 MMOL/L (ref 135–147)

## 2024-05-10 ENCOUNTER — TELEPHONE (OUTPATIENT)
Age: 89
End: 2024-05-10

## 2024-05-10 NOTE — TELEPHONE ENCOUNTER
Called Jennifer back.Relayed that Pt was seen in office this week but that was all the info I could give.

## 2024-05-10 NOTE — TELEPHONE ENCOUNTER
Jennifer from pt's eye doctor's office called concerned that pt did NOT show up for his appointment today in addition to not answering his phone. She mentioned that he likes to take a lot of walks, however didn't think he would be walking in the rain this morning. She states that this is very unusual for him to miss an appointment. She asked if he was in the hospital. I explained that I can pass a message along for PCP to review. Please advise how we should proceed.

## 2024-05-29 PROBLEM — N39.0 UTI (URINARY TRACT INFECTION): Status: RESOLVED | Noted: 2024-04-27 | Resolved: 2024-05-29

## 2024-07-15 ENCOUNTER — TELEPHONE (OUTPATIENT)
Dept: FAMILY MEDICINE CLINIC | Facility: MEDICAL CENTER | Age: 89
End: 2024-07-15

## 2024-07-15 NOTE — TELEPHONE ENCOUNTER
Can we please obtain records [facility] so I can confirm medications and doses before I prescribe them please? Thank you.

## 2024-07-15 NOTE — TELEPHONE ENCOUNTER
Pt discharged from [facility] facility.  Pt states that he was prescribed Metforim 850mg twice a day and Lexapro 30mg one a day. Pt would like refill for both medication to be refilled and to be sent to [facility] in [place]

## 2024-07-16 DIAGNOSIS — E78.5 HYPERLIPIDEMIA, UNSPECIFIED HYPERLIPIDEMIA TYPE: ICD-10-CM

## 2024-07-16 DIAGNOSIS — R45.89 DYSPHORIC MOOD: Primary | ICD-10-CM

## 2024-07-16 RX ORDER — ROSUVASTATIN CALCIUM 5 MG/1
5 TABLET, COATED ORAL DAILY
Qty: 90 TABLET | Refills: 1 | Status: SHIPPED | OUTPATIENT
Start: 2024-07-16

## 2024-07-16 RX ORDER — ESCITALOPRAM OXALATE 20 MG/1
20 TABLET ORAL DAILY
Qty: 90 TABLET | Refills: 0 | Status: SHIPPED | OUTPATIENT
Start: 2024-07-16 | End: 2024-07-26

## 2024-07-16 RX ORDER — ESCITALOPRAM OXALATE 20 MG/1
20 TABLET ORAL DAILY
COMMUNITY
Start: 2024-06-26 | End: 2024-07-16 | Stop reason: SDUPTHER

## 2024-07-16 NOTE — TELEPHONE ENCOUNTER
Pt is aware about medications  and that PCP will discuss his metformin at his upcoming appt 7/26 @

## 2024-07-16 NOTE — TELEPHONE ENCOUNTER
Please inform patient I refilled his rosuvastatin and Lexapro. However, during hospital stay in April looks like his metformin was discontinued and he was advised to continue both insulins therefore this is why it was taken off of his med list and I will not be refilling at this time. I will also further discuss this with him at his upcoming office visit.

## 2024-07-16 NOTE — TELEPHONE ENCOUNTER
He is not on Arotvastatin, he is on Rosuvastatin so I will refill this.   Clerical; please call [facility] and clarify medication names and doses so I can refill since we cannot get records from facility.

## 2024-07-16 NOTE — TELEPHONE ENCOUNTER
Called [facility] [phone].Metformin 850mg was last filled in Feb., 90 day supply. Lexapro is 20mg and that was last filled June 28th 2024.

## 2024-07-16 NOTE — TELEPHONE ENCOUNTER
We will have to get records or at least a verbal from one of the staff clarifying what medications and what doses to be sure we are prescribing the correct medication for the patient. There should be someone able to provide that information due to the medical record staff being out on vacation.

## 2024-07-16 NOTE — TELEPHONE ENCOUNTER
Pt called again requesting his medication he is out of    Metforim 850mg twice a day and Lexapro 30mg one a day and also Atorvastin     Please advise pt if we are able to send these medications to [facility]  in [place]     Phone [phone]

## 2024-07-17 ENCOUNTER — TELEPHONE (OUTPATIENT)
Age: 89
End: 2024-07-17

## 2024-07-17 NOTE — TELEPHONE ENCOUNTER
Patient is not on atorvastatin, he is on rosuvastatin and this was already refilled.  Metformin was discontinued in the hospital.  This was already sent in a separate message yesterday.

## 2024-07-17 NOTE — TELEPHONE ENCOUNTER
Patient called to get a refill on Metformin 850mg BID and Atorvastatin 10mg. Patient seem unsure of the medications that he should be taking. Please review and send to the [facility]/pharmacy #[id number] - [place] - [address]  if patient should be taking.     Patient is asking if someone could please give him a call to let him know.

## 2024-07-19 ENCOUNTER — RA CDI HCC (OUTPATIENT)
Dept: OTHER | Facility: HOSPITAL | Age: 89
End: 2024-07-19

## 2024-07-26 ENCOUNTER — TELEPHONE (OUTPATIENT)
Dept: FAMILY MEDICINE CLINIC | Facility: MEDICAL CENTER | Age: 89
End: 2024-07-26

## 2024-07-26 ENCOUNTER — OFFICE VISIT (OUTPATIENT)
Dept: FAMILY MEDICINE CLINIC | Facility: MEDICAL CENTER | Age: 89
End: 2024-07-26
Payer: MEDICARE

## 2024-07-26 VITALS
HEIGHT: 68 IN | RESPIRATION RATE: 20 BRPM | DIASTOLIC BLOOD PRESSURE: 86 MMHG | SYSTOLIC BLOOD PRESSURE: 152 MMHG | HEART RATE: 48 BPM | OXYGEN SATURATION: 98 % | TEMPERATURE: 98 F | WEIGHT: 173.4 LBS | BODY MASS INDEX: 26.28 KG/M2

## 2024-07-26 DIAGNOSIS — Z79.4 TYPE 2 DIABETES MELLITUS WITH HYPERGLYCEMIA, WITH LONG-TERM CURRENT USE OF INSULIN (HCC): ICD-10-CM

## 2024-07-26 DIAGNOSIS — R26.2 AMBULATORY DYSFUNCTION: ICD-10-CM

## 2024-07-26 DIAGNOSIS — E11.65 TYPE 2 DIABETES MELLITUS WITH HYPERGLYCEMIA, WITH LONG-TERM CURRENT USE OF INSULIN (HCC): ICD-10-CM

## 2024-07-26 DIAGNOSIS — R41.89 COGNITIVE IMPAIRMENT: ICD-10-CM

## 2024-07-26 DIAGNOSIS — R45.89 DYSPHORIC MOOD: Primary | ICD-10-CM

## 2024-07-26 DIAGNOSIS — M79.9 ABNORMALITY OF SOFT TISSUE ON EXAMINATION: ICD-10-CM

## 2024-07-26 DIAGNOSIS — R19.7 DIARRHEA, UNSPECIFIED TYPE: ICD-10-CM

## 2024-07-26 PROCEDURE — 99214 OFFICE O/P EST MOD 30 MIN: CPT

## 2024-07-26 RX ORDER — ESCITALOPRAM OXALATE 10 MG/1
10 TABLET ORAL DAILY
Qty: 90 TABLET | Refills: 0 | Status: SHIPPED | OUTPATIENT
Start: 2024-07-26 | End: 2024-10-24

## 2024-07-26 NOTE — TELEPHONE ENCOUNTER
Called [facility] [phone] to get Pts medication record. Person who answered call said she had no record of Pt and transferred me to records dept,call went to

## 2024-07-26 NOTE — PROGRESS NOTES
Assessment/Plan:    Return in 2 weeks for recheck, sooner if needed.   Will review home BS log at next visit.  Medication changes as below.  Will continue to try and obtain records from [facility].     1. Dysphoric mood  It looks as though he was supposed to be taking 5 mg daily per discharge records from hospital.  Last filled at 20 mg daily due to discrepancy in records and with pharmacy.  Therefore, we will continue it however decrease the dose to 10 mg daily at this time.  - escitalopram (LEXAPRO) 10 mg tablet; Take 1 tablet (10 mg total) by mouth daily  Dispense: 90 tablet; Refill: 0    2. Type 2 diabetes mellitus with hyperglycemia, with long-term current use of insulin (HCC)  Most recent A1c 7.5%  Metformin was discontinued in the hospital setting.  Levemir continued twice daily as he was always previously taking this.  Low blood sugar readings in the morning around 51.  Advised patient to continue monitoring blood sugars both morning fasting and 2 hours after dinner, keep log and bring back in 2 weeks for review.  We will decrease Levemir to 20 units twice daily from 25 units twice daily.  Advised to hold insulin if blood sugar is running low.  Advised to drink a small glass of orange juice and or eat some crackers to raise blood sugar.    3. Cognitive impairment  Patient is currently alert and oriented x 3.  Ambulatory with steady gait with use of cane.  Lives alone.  Currently has home health aide coming in, also has neighbor who lives in same apartment building checking on him and bringing him dinner daily.    4. Ambulatory dysfunction  Ambulatory with steady gait with use of cane.    5. Abnormality of soft tissue on posterior neck  CT soft tissue neck performed in the hospital setting on 4/29/2024 with the following findings;  No neck abscess, as clinically questioned.     7.0 cm subcutaneous lipoma in right posterior paramidline neck. Given internal wispy fat heterogeneity, atypical lipomatous  tumor in the differential.     1.3 cm subcutaneous lesion in right posterior upper neck laterally adjacent to the fatty lesion, likely sebaceous/epidermal inclusion cyst.     No suspicious cervical lymphadenopathy.  Patient reports he is aware of this posterior neck mass and tells me it has been present for many years, does not wish to have this surgically removed.    6. Diarrhea, unspecified type  Declines stool studies at this time.  Advised about use of Imodium as directed over the weekend.  Advised to call the office next week if diarrhea persists.      Subjective:      Patient ID: Jeffery Keys is a 88 y.o. male.    88-year-old male presents for transition of care management after being discharged from [facility] at the end of June.  I do not have records to review from [facility] as we have made multiple calls to their facility and have been unsuccessful in speaking with somebody in the records department to have these records sent over to our office.    He was initially admitted to [facility] from 4/27 to 5/3 with admitting diagnosis of UTI, bacteremia, altered mental status.  After this visit, he was discharged to [facility] where he tells me he spent the month of June.  Upon discharge from hospital, he was started on Lexapro 5 mg daily for dysphoric mood.  Metformin was discontinued and home insulin was resumed.  He is not sure why his metformin was discontinued however he does go on to tell me that his home blood sugar readings have been low especially in the morning.  He reports checking blood sugars both morning and night, tells me morning readings are around 51 fasting and 2 hours after dinner numbers are around 200-230.  He does not have a blood sugar log today for review and is currently using Levemir 25 units twice daily which he tells me he recently decreased to 20  units twice daily due to low blood sugar readings.  He was also advised to follow-up with  [facility], referral was placed while in the hospital.  He was also discharged with a 10-day course of Keflex for the UTI which he has since completed.    Today, he appears to be doing well overall, he is alert and oriented and ambulatory with steady gait with use of cane.  He does live alone in an apartment building and has neighbors that check on him daily, bring him dinner daily and take him to his appointments.  He is not currently confused.  He tells me that there have been nurses coming in weekly to check in with him as well as PT.  There has been some confusion over the past several weeks as far as what medications he is supposed to be taking and at what dose.  There have been multiple changes to his medications between the hospital stay and [facility].  It looks as though he was placed on 5 mg of Lexapro daily but when a call was placed to our office for refill of this Lexapro and an attempt to clarify the dose the reported dose was 30 mg daily therefore, the Lexapro was refilled at 20 mg daily which is what he has been taking for the past month.  He was placed on meal coverage insulin 3 times daily in the hospital setting which he is no longer using, he did resume his normal daily dosing of long-acting insulin which he takes twice daily, reports he has been taking this the same way for many years since seeing his previous PCP Dr. Orozco.  He also tells me that he will start receiving Meals on Wheels in the next week or so.  He is complaining of diarrhea 3-4 times per day x 1 week.  He is not currently on any antibiotics, denies blood in the stool, abdominal pain, nausea, vomiting.  He is not taking anything for the diarrhea.   He does not think he will be able to do stool samples if ordered. He is tolerating PO intake well.             The following portions of the patient's history were reviewed and updated as appropriate: allergies, past family history, past medical history, past social  "history, past surgical history, and problem list.    Review of Systems   Constitutional: Negative.    HENT: Negative.     Eyes: Negative.    Respiratory: Negative.     Cardiovascular: Negative.    Gastrointestinal:  Positive for diarrhea.   Endocrine: Negative.    Genitourinary: Negative.    Musculoskeletal: Negative.    Skin: Negative.    Allergic/Immunologic: Negative.    Neurological: Negative.    Hematological: Negative.    Psychiatric/Behavioral: Negative.           Objective:      /86   Pulse (!) 48   Temp 98 °F (36.7 °C) (Temporal)   Resp 20   Ht 5' 8\" (1.727 m)   Wt 78.7 kg (173 lb 6.4 oz)   SpO2 98%   BMI 26.37 kg/m²          Physical Exam  Vitals and nursing note reviewed.   Constitutional:       General: He is not in acute distress.     Appearance: Normal appearance. He is not ill-appearing.   HENT:      Head: Normocephalic and atraumatic.   Cardiovascular:      Rate and Rhythm: Regular rhythm. Bradycardia present.      Pulses: Normal pulses.      Heart sounds: Normal heart sounds.   Pulmonary:      Effort: Pulmonary effort is normal.      Breath sounds: Normal breath sounds.   Abdominal:      General: Bowel sounds are normal.      Palpations: Abdomen is soft.      Tenderness: There is no abdominal tenderness. There is no guarding.   Skin:     General: Skin is warm and dry.   Neurological:      General: No focal deficit present.      Mental Status: He is alert. Mental status is at baseline.      Gait: Gait (ambulatory, steady gait with use of cane) normal.   Psychiatric:         Behavior: Behavior normal.                    DELILAH Mcpherson    "

## 2024-07-26 NOTE — TELEPHONE ENCOUNTER
Latricia from [facility] called to return Tarah's call. Transferred to the office.

## 2024-07-26 NOTE — PATIENT INSTRUCTIONS
Decrease insulin to 20 units twice daily.     Do not take your insulin if your blood sugar is low. If your blood sugar is low drink small glass of orange juice and have some crackers.     Check your blood sugars in the morning before you eat and at least 2 hours after dinner.     Decrease Lexapro (escitalopram) to 10 mg daily.     Take imodium as needed for diarrhea.  Call the office if your diarrhea persists for more than 3 more days.     Return in 2 weeks for recheck.

## 2024-08-08 ENCOUNTER — OFFICE VISIT (OUTPATIENT)
Dept: FAMILY MEDICINE CLINIC | Facility: MEDICAL CENTER | Age: 89
End: 2024-08-08
Payer: MEDICARE

## 2024-08-08 VITALS
OXYGEN SATURATION: 98 % | RESPIRATION RATE: 20 BRPM | DIASTOLIC BLOOD PRESSURE: 76 MMHG | HEIGHT: 68 IN | TEMPERATURE: 98 F | SYSTOLIC BLOOD PRESSURE: 160 MMHG | BODY MASS INDEX: 26.67 KG/M2 | HEART RATE: 67 BPM | WEIGHT: 176 LBS

## 2024-08-08 DIAGNOSIS — M79.9 ABNORMALITY OF SOFT TISSUE ON EXAMINATION: ICD-10-CM

## 2024-08-08 DIAGNOSIS — E11.65 TYPE 2 DIABETES MELLITUS WITH HYPERGLYCEMIA, WITH LONG-TERM CURRENT USE OF INSULIN (HCC): Primary | ICD-10-CM

## 2024-08-08 DIAGNOSIS — Z79.4 TYPE 2 DIABETES MELLITUS WITH HYPERGLYCEMIA, WITH LONG-TERM CURRENT USE OF INSULIN (HCC): Primary | ICD-10-CM

## 2024-08-08 LAB — SL AMB POCT HEMOGLOBIN AIC: 6.5 (ref ?–6.5)

## 2024-08-08 PROCEDURE — 83036 HEMOGLOBIN GLYCOSYLATED A1C: CPT

## 2024-08-08 PROCEDURE — 99214 OFFICE O/P EST MOD 30 MIN: CPT

## 2024-08-08 NOTE — PROGRESS NOTES
Diabetic Foot Exam    Patient's shoes and socks removed.    Right Foot/Ankle   Right Foot Inspection  Skin Exam: skin normal. Skin not intact, no dry skin, no warmth, no callus, no erythema, no maceration, no abnormal color, no pre-ulcer, no ulcer and no callus.     Toe Exam: ROM and strength within normal limits.     Sensory   Monofilament testing: intact    Vascular  Capillary refills: < 3 seconds  The right DP pulse is 1+. The right PT pulse is 1+.     Left Foot/Ankle  Left Foot Inspection  Skin Exam: skin normal. Skin not intact, no dry skin, no warmth, no erythema, no maceration, normal color, no pre-ulcer, no ulcer and no callus.     Toe Exam: ROM and strength within normal limits.     Sensory   Monofilament testing: intact    Vascular  Capillary refills: < 3 seconds  The left DP pulse is 1+. The left PT pulse is 1+.     Assign Risk Category  No deformity present  No loss of protective sensation  No weak pulses  Risk: 0

## 2024-08-08 NOTE — PROGRESS NOTES
Assessment/Plan:    Return in 3 months for follow up, sooner if needed.      1. Type 2 diabetes mellitus with hyperglycemia, with long-term current use of insulin ([facility])  A1c in office today 6.5%.  Continue current dose of Levemir.  Advised patient to hold morning dose of insulin if blood sugar is <100.   Discussed protocol for hypoglycemia.   Continue to monitor home BS readings, keep log.  Return in 3 months for follow up.  - POCT hemoglobin A1c      2.  Abnormality of soft tissue on posterior neck  Patient reports this has been present for many years, does not wish to have this removed or further evaluation of this.    Subjective:      Patient ID: Jeffery Keys is a 88 y.o. male.    88-year-old male presents for 2-week follow-up diabetes.  His metformin was discontinued during his most recent hospital stay and Levemir was decreased at his last office visit due to low blood sugar readings especially in the morning.  He was advised to monitor home blood sugars both in the morning fasting and 2 hours after lunch or dinner and bring log today for review.  Upon review of home blood sugar log, it appears that morning blood sugar readings are on the lower end ranging from  and evening blood sugars running high ranging in the high 100s-200s. He tells me he usually eats a large meal for dinner prepared by his neighbor and it appears as though his BS fluctuates based on his meals.   He does not offer any complaints at this time.  He reports diarrhea has resolved since his last office visit.           The following portions of the patient's history were reviewed and updated as appropriate: allergies, current medications, past family history, past medical history, past surgical history, and problem list.    Review of Systems   Constitutional: Negative.    HENT: Negative.     Eyes: Negative.    Respiratory: Negative.     Cardiovascular: Negative.    Gastrointestinal: Negative.    Endocrine: Negative.    Genitourinary:  "Negative.    Musculoskeletal: Negative.    Skin: Negative.    Allergic/Immunologic: Negative.    Neurological: Negative.    Hematological: Negative.    Psychiatric/Behavioral: Negative.           Objective:      /76 (BP Location: Left arm, Patient Position: Sitting, Cuff Size: Standard)   Pulse 67   Temp 98 °F (36.7 °C) (Temporal)   Resp 20   Ht 5' 8\" (1.727 m)   Wt 79.8 kg (176 lb)   SpO2 98%   BMI 26.76 kg/m²          Physical Exam  Vitals and nursing note reviewed.   Constitutional:       General: He is not in acute distress.     Appearance: Normal appearance. He is not ill-appearing.   HENT:      Head: Normocephalic and atraumatic.   Cardiovascular:      Rate and Rhythm: Normal rate and regular rhythm.      Pulses: Normal pulses.      Heart sounds: Normal heart sounds.   Pulmonary:      Effort: Pulmonary effort is normal.      Breath sounds: Normal breath sounds.   Skin:     General: Skin is warm and dry.      Comments: Large soft tissue mass noted to posterior neck. Non-tender.    Neurological:      Mental Status: He is alert. Mental status is at baseline.                    DELILAH Mcpherson    "

## 2024-08-09 ENCOUNTER — TELEPHONE (OUTPATIENT)
Dept: ADMINISTRATIVE | Facility: OTHER | Age: 89
End: 2024-08-09

## 2024-08-09 NOTE — TELEPHONE ENCOUNTER
----- Message from Sandra CAMPOS sent at 8/9/2024  7:00 AM EDT -----  Regarding: Eye Exam- Ortiz WILLIAM  08/09/24 7:00 AM    Hello, our patient Jeffery Keys has had Diabetic Eye Exam completed/performed. Please assist in updating the patient chart by pulling the document from the Media Tab. The date of service is 08/10/2023.     Thank you,  Sandra WILLIAM

## 2024-08-12 NOTE — TELEPHONE ENCOUNTER
Upon review of the In Basket request we were able to locate, review, and update the patient chart as requested for Diabetic Eye Exam.    Any additional questions or concerns should be emailed to the Practice Liaisons via the appropriate education email address, please do not reply via In Basket.    Thank you  Cash Arango MA   [facility]

## 2024-08-16 ENCOUNTER — TELEPHONE (OUTPATIENT)
Dept: FAMILY MEDICINE CLINIC | Facility: MEDICAL CENTER | Age: 89
End: 2024-08-16

## 2024-08-16 NOTE — TELEPHONE ENCOUNTER
Pt came into the office because he needs test strips. He is requesting the we send in [employer] test strips in the [facility] in [place].

## 2024-08-18 DIAGNOSIS — E11.65 TYPE 2 DIABETES MELLITUS WITH HYPERGLYCEMIA, WITH LONG-TERM CURRENT USE OF INSULIN (HCC): Primary | ICD-10-CM

## 2024-08-18 DIAGNOSIS — Z79.4 TYPE 2 DIABETES MELLITUS WITH HYPERGLYCEMIA, WITH LONG-TERM CURRENT USE OF INSULIN (HCC): Primary | ICD-10-CM

## 2024-08-19 DIAGNOSIS — Z79.4 TYPE 2 DIABETES MELLITUS WITH HYPERGLYCEMIA, WITH LONG-TERM CURRENT USE OF INSULIN (HCC): Primary | ICD-10-CM

## 2024-08-19 DIAGNOSIS — E11.65 TYPE 2 DIABETES MELLITUS WITH HYPERGLYCEMIA, WITH LONG-TERM CURRENT USE OF INSULIN (HCC): Primary | ICD-10-CM

## 2024-08-19 RX ORDER — BLOOD-GLUCOSE METER
KIT MISCELLANEOUS
Qty: 1 KIT | Refills: 0 | Status: SHIPPED | OUTPATIENT
Start: 2024-08-19 | End: 2024-08-19

## 2024-08-19 RX ORDER — LANCETS 33 GAUGE
EACH MISCELLANEOUS
Qty: 200 EACH | Refills: 3 | Status: SHIPPED | OUTPATIENT
Start: 2024-08-19

## 2024-08-19 RX ORDER — BLOOD SUGAR DIAGNOSTIC
STRIP MISCELLANEOUS
Qty: 100 EACH | Refills: 3 | Status: SHIPPED | OUTPATIENT
Start: 2024-08-19 | End: 2024-08-19

## 2024-08-19 RX ORDER — BLOOD SUGAR DIAGNOSTIC
STRIP MISCELLANEOUS
Qty: 200 EACH | Refills: 3 | Status: CANCELLED | OUTPATIENT
Start: 2024-08-19

## 2024-08-19 RX ORDER — BLOOD-GLUCOSE METER
KIT MISCELLANEOUS
Qty: 1 KIT | Refills: 0 | Status: CANCELLED | OUTPATIENT
Start: 2024-08-19

## 2024-08-19 RX ORDER — BLOOD-GLUCOSE METER
KIT MISCELLANEOUS
Qty: 1 KIT | Refills: 0 | Status: SHIPPED | OUTPATIENT
Start: 2024-08-19

## 2024-08-19 RX ORDER — LANCETS 33 GAUGE
EACH MISCELLANEOUS
Qty: 200 EACH | Refills: 3 | Status: CANCELLED | OUTPATIENT
Start: 2024-08-19

## 2024-08-19 RX ORDER — BLOOD SUGAR DIAGNOSTIC
STRIP MISCELLANEOUS
Qty: 200 EACH | Refills: 3 | Status: SHIPPED | OUTPATIENT
Start: 2024-08-19

## 2024-08-19 RX ORDER — LANCETS 33 GAUGE
EACH MISCELLANEOUS
Qty: 100 EACH | Refills: 3 | Status: SHIPPED | OUTPATIENT
Start: 2024-08-19 | End: 2024-08-19

## 2024-08-20 ENCOUNTER — TELEPHONE (OUTPATIENT)
Dept: FAMILY MEDICINE CLINIC | Facility: MEDICAL CENTER | Age: 89
End: 2024-08-20

## 2024-08-20 NOTE — TELEPHONE ENCOUNTER
8/20/24 I asked dr. Rae if she could order everything for the pt because it would not let me sign the order. She ordered them last night.     ----- Message from DELILAH Castillo sent at 8/19/2024  2:51 PM EDT -----  Regarding: RE: dm test strips  Ok please go into the diabetic smart set in his chart and send it. Thank you.  ----- Message -----  From: Kristin Gomez MA  Sent: 8/19/2024   1:16 PM EDT  To: DELILAH Mcpherson  Subject: dm test strips                                   The order for his test strips did not go to the pharmacy. I reached out to his pharmacy and they do not carry the brand he had requested. We will have to send in for a whole new glucometer along with refills for the test strips and supplies.

## 2024-10-15 ENCOUNTER — TELEPHONE (OUTPATIENT)
Age: 89
End: 2024-10-15

## 2024-10-15 NOTE — TELEPHONE ENCOUNTER
Can we please check with patient's pharmacy to find out what alternative insulin pens are available so I can send in new rx?

## 2024-10-15 NOTE — TELEPHONE ENCOUNTER
Patient requested that the message be sent to DELILAH Mcpherson.  Patient states that the pharmacy told him that they are no longer making the Levemir.   Patient would like a pen of whatever insulin that he will have to take. Patient states that it is too hard for him to fill the syringes.     Please review and send new script to [facility]/pharmacy #[id number] - [place] - [address]

## 2024-10-17 ENCOUNTER — TELEPHONE (OUTPATIENT)
Age: 89
End: 2024-10-17

## 2024-10-17 NOTE — TELEPHONE ENCOUNTER
Patient called and stated he lives in a assisted living [facility] and requesting a letter  that the patient can have a adapted  tub where the sides of the tub of the tub are cut out ,easier for the patient to get in and out of the tub.Please address the letter to.Attn:Estuardo Foster  Please mail letter to patient when completed.

## 2024-10-18 DIAGNOSIS — Z79.4 TYPE 2 DIABETES MELLITUS WITH HYPERGLYCEMIA, WITH LONG-TERM CURRENT USE OF INSULIN (HCC): Primary | ICD-10-CM

## 2024-10-18 DIAGNOSIS — E11.65 TYPE 2 DIABETES MELLITUS WITH HYPERGLYCEMIA, WITH LONG-TERM CURRENT USE OF INSULIN (HCC): Primary | ICD-10-CM

## 2024-10-18 RX ORDER — INSULIN GLARGINE 100 [IU]/ML
20 INJECTION, SOLUTION SUBCUTANEOUS 2 TIMES DAILY
Qty: 36 ML | Refills: 1 | Status: SHIPPED | OUTPATIENT
Start: 2024-10-18 | End: 2025-04-16

## 2024-10-18 NOTE — TELEPHONE ENCOUNTER
Call placed to [facility] as requested below.  Per Pharmacist Shabbir he has available the Lantus Solstar Pen or Basaglar Pen to replace the Levemir flex pen which is on back order.      Please fax new script to the Pharmacy.

## 2024-10-21 DIAGNOSIS — R45.89 DYSPHORIC MOOD: ICD-10-CM

## 2024-10-21 RX ORDER — ESCITALOPRAM OXALATE 10 MG/1
10 TABLET ORAL DAILY
Qty: 90 TABLET | Refills: 0 | Status: SHIPPED | OUTPATIENT
Start: 2024-10-21

## 2024-10-21 NOTE — TELEPHONE ENCOUNTER
Reason for call:   [x] Refill   [] Prior Auth  [] Other:     Office:   [x] PCP/Provider -   [] Specialty/Provider -     Medication:   Escitalopram (Lexapro) 10mg- take 1 tablet by mouth daily      Pharmacy: [facility]    Does the patient have enough for 3 days?   [] Yes   [x] No - Send as HP to POD

## 2024-11-08 ENCOUNTER — APPOINTMENT (OUTPATIENT)
Dept: LAB | Facility: MEDICAL CENTER | Age: 89
End: 2024-11-08
Payer: MEDICARE

## 2024-11-08 ENCOUNTER — OFFICE VISIT (OUTPATIENT)
Dept: FAMILY MEDICINE CLINIC | Facility: MEDICAL CENTER | Age: 89
End: 2024-11-08
Payer: MEDICARE

## 2024-11-08 VITALS
BODY MASS INDEX: 27.28 KG/M2 | SYSTOLIC BLOOD PRESSURE: 152 MMHG | OXYGEN SATURATION: 97 % | WEIGHT: 180 LBS | HEIGHT: 68 IN | RESPIRATION RATE: 18 BRPM | DIASTOLIC BLOOD PRESSURE: 80 MMHG | HEART RATE: 60 BPM | TEMPERATURE: 97.8 F

## 2024-11-08 DIAGNOSIS — Z79.4 TYPE 2 DIABETES MELLITUS WITH HYPERGLYCEMIA, WITH LONG-TERM CURRENT USE OF INSULIN (HCC): ICD-10-CM

## 2024-11-08 DIAGNOSIS — Z23 ENCOUNTER FOR IMMUNIZATION: ICD-10-CM

## 2024-11-08 DIAGNOSIS — E11.65 TYPE 2 DIABETES MELLITUS WITH HYPERGLYCEMIA, WITH LONG-TERM CURRENT USE OF INSULIN (HCC): ICD-10-CM

## 2024-11-08 DIAGNOSIS — I10 HTN (HYPERTENSION), BENIGN: ICD-10-CM

## 2024-11-08 DIAGNOSIS — Z23 NEED FOR COVID-19 VACCINE: ICD-10-CM

## 2024-11-08 DIAGNOSIS — I10 HTN (HYPERTENSION), BENIGN: Primary | ICD-10-CM

## 2024-11-08 LAB
ANION GAP SERPL CALCULATED.3IONS-SCNC: 9 MMOL/L (ref 4–13)
BUN SERPL-MCNC: 19 MG/DL (ref 5–25)
CALCIUM SERPL-MCNC: 9.8 MG/DL (ref 8.4–10.2)
CHLORIDE SERPL-SCNC: 104 MMOL/L (ref 96–108)
CO2 SERPL-SCNC: 25 MMOL/L (ref 21–32)
CREAT SERPL-MCNC: 0.9 MG/DL (ref 0.6–1.3)
EST. AVERAGE GLUCOSE BLD GHB EST-MCNC: 151 MG/DL
GFR SERPL CREATININE-BSD FRML MDRD: 77 ML/MIN/1.73SQ M
GLUCOSE P FAST SERPL-MCNC: 73 MG/DL (ref 65–99)
HBA1C MFR BLD: 6.9 %
POTASSIUM SERPL-SCNC: 4.2 MMOL/L (ref 3.5–5.3)
SODIUM SERPL-SCNC: 138 MMOL/L (ref 135–147)

## 2024-11-08 PROCEDURE — 80048 BASIC METABOLIC PNL TOTAL CA: CPT

## 2024-11-08 PROCEDURE — 91320 SARSCV2 VAC 30MCG TRS-SUC IM: CPT

## 2024-11-08 PROCEDURE — G0008 ADMIN INFLUENZA VIRUS VAC: HCPCS

## 2024-11-08 PROCEDURE — 90662 IIV NO PRSV INCREASED AG IM: CPT

## 2024-11-08 PROCEDURE — 83036 HEMOGLOBIN GLYCOSYLATED A1C: CPT

## 2024-11-08 PROCEDURE — 36415 COLL VENOUS BLD VENIPUNCTURE: CPT

## 2024-11-08 PROCEDURE — 99214 OFFICE O/P EST MOD 30 MIN: CPT

## 2024-11-08 PROCEDURE — 90480 ADMN SARSCOV2 VAC 1/ONLY CMP: CPT

## 2024-11-08 RX ORDER — LISINOPRIL 5 MG/1
5 TABLET ORAL DAILY
Qty: 90 TABLET | Refills: 0 | Status: SHIPPED | OUTPATIENT
Start: 2024-11-08

## 2024-11-08 NOTE — ASSESSMENT & PLAN NOTE
Blood pressure elevated in office today at 152/80.  Blood pressure has also been elevated during prior office visits.  Previously on lisinopril which she has not taken in quite some time.  At this time we will restart lisinopril low-dose 5 mg daily.  Recheck BMP in 2 weeks.  Follow-up in office with nurse for blood pressure check in 2 weeks  Orders:    lisinopril (ZESTRIL) 5 mg tablet; Take 1 tablet (5 mg total) by mouth daily    Basic metabolic panel; Future    Basic metabolic panel

## 2024-11-08 NOTE — PROGRESS NOTES
Ambulatory Visit  Name: Jeffery Keys      : 1935      MRN: [id number]  Encounter Provider: DELILAH Mcpherson  Encounter Date: 2024   Encounter department: [facility] [place]    Assessment & Plan  HTN (hypertension), benign  Blood pressure elevated in office today at 152/80.  Blood pressure has also been elevated during prior office visits.  Previously on lisinopril which she has not taken in quite some time.  At this time we will restart lisinopril low-dose 5 mg daily.  Recheck BMP in 2 weeks.  Follow-up in office with nurse for blood pressure check in 2 weeks  Orders:    lisinopril (ZESTRIL) 5 mg tablet; Take 1 tablet (5 mg total) by mouth daily    Basic metabolic panel; Future    Basic metabolic panel    Type 2 diabetes mellitus with hyperglycemia, with long-term current use of insulin ([facility])  Advised patient to have A1c rechecked in 2 weeks when he goes to have BMP checked.  Continue with current dose of insulin.  Lab Results   Component Value Date    HGBA1C 6.5 2024       Orders:    Hemoglobin A1C; Future    Hemoglobin A1C    Encounter for immunization    Orders:    influenza vaccine, high-dose, PF 0.5 mL (Fluzone High Dose)    Need for COVID-19 vaccine    Orders:    COVID-19 Pfizer mRNA vaccine 12 yr and older (Comirnaty pre-filled syringe)       History of Present Illness     88-year-old male presents for follow-up.  He seems to be doing well, ambulatory with cane at this time and walker at home.  Still lives alone in an apartment, his neighbors continue to check on him regularly and bring him dinner daily. He has diabetes mellitus, reports compliance with insulin.  He has hypertension, previously on lisinopril but has not been on this in quite some time.  Blood pressure is elevated in office today, was also elevated at prior office visits.   He is agreeable to influenza and COVID vaccines today.    No concerns or complaints at this time.          History obtained  "from : patient  Review of Systems   Constitutional: Negative.    HENT: Negative.     Eyes: Negative.    Respiratory: Negative.     Cardiovascular: Negative.    Gastrointestinal: Negative.    Endocrine: Negative.    Genitourinary: Negative.    Musculoskeletal: Negative.    Skin: Negative.    Allergic/Immunologic: Negative.    Neurological: Negative.    Hematological: Negative.    Psychiatric/Behavioral: Negative.           Objective     /80 (BP Location: Left arm, Patient Position: Sitting, Cuff Size: Standard)   Pulse 60   Temp 97.8 °F (36.6 °C) (Temporal)   Resp 18   Ht 5' 8\" (1.727 m)   Wt 81.6 kg (180 lb)   SpO2 97%   BMI 27.37 kg/m²     Physical Exam  Vitals and nursing note reviewed.   Constitutional:       General: He is not in acute distress.     Appearance: Normal appearance. He is not ill-appearing.   HENT:      Head: Normocephalic and atraumatic.   Cardiovascular:      Rate and Rhythm: Normal rate and regular rhythm.      Pulses: Normal pulses.      Heart sounds: Normal heart sounds.   Pulmonary:      Effort: Pulmonary effort is normal.      Breath sounds: Normal breath sounds.   Neurological:      General: No focal deficit present.      Mental Status: He is alert. Mental status is at baseline.   Psychiatric:         Mood and Affect: Mood normal.         Behavior: Behavior normal.         "

## 2024-11-08 NOTE — ASSESSMENT & PLAN NOTE
Advised patient to have A1c rechecked in 2 weeks when he goes to have BMP checked.  Continue with current dose of insulin.  Lab Results   Component Value Date    HGBA1C 6.5 08/08/2024       Orders:    Hemoglobin A1C; Future    Hemoglobin A1C

## 2024-11-11 DIAGNOSIS — I10 HTN (HYPERTENSION), BENIGN: Primary | ICD-10-CM

## 2024-11-12 ENCOUNTER — TELEPHONE (OUTPATIENT)
Age: 89
End: 2024-11-12

## 2024-11-12 NOTE — TELEPHONE ENCOUNTER
Patient called in tor clarification of when PCP expects patient to get new lab order for BMP completed. RN discussed with office and patient should have it done on 11/22; day of nurse visit. Patient advised of 8 hour fasting required for lab draw. Patient verbalized understanding.    Patient reports home phone not working and was using someone's cell phone for call; did not identify person who's phone it was.

## 2024-11-12 NOTE — TELEPHONE ENCOUNTER
Spoke to the pt and Kanchan Reno from [facility] clarified what the PCP wants the pt to do.

## 2024-11-19 ENCOUNTER — TELEPHONE (OUTPATIENT)
Age: 89
End: 2024-11-19

## 2024-11-19 NOTE — TELEPHONE ENCOUNTER
There are no notes for a phone call today. Asked others in the office if they called pt and no one made any phone calls.

## 2024-11-19 NOTE — TELEPHONE ENCOUNTER
Patient called, stated that he received a call from [facility], but he couldn't understand the message. Patient has  hearing problem. There is no information in a chart about a phone call today. Please follow up.

## 2024-11-22 ENCOUNTER — TELEPHONE (OUTPATIENT)
Dept: FAMILY MEDICINE CLINIC | Facility: MEDICAL CENTER | Age: 89
End: 2024-11-22

## 2024-11-22 ENCOUNTER — CLINICAL SUPPORT (OUTPATIENT)
Dept: FAMILY MEDICINE CLINIC | Facility: MEDICAL CENTER | Age: 89
End: 2024-11-22
Payer: MEDICARE

## 2024-11-22 ENCOUNTER — APPOINTMENT (OUTPATIENT)
Dept: LAB | Facility: MEDICAL CENTER | Age: 89
End: 2024-11-22
Payer: MEDICARE

## 2024-11-22 VITALS — SYSTOLIC BLOOD PRESSURE: 110 MMHG | DIASTOLIC BLOOD PRESSURE: 60 MMHG

## 2024-11-22 DIAGNOSIS — E11.9 TYPE 2 DIABETES MELLITUS WITHOUT COMPLICATION, WITH LONG-TERM CURRENT USE OF INSULIN (HCC): ICD-10-CM

## 2024-11-22 DIAGNOSIS — I10 HTN (HYPERTENSION), BENIGN: ICD-10-CM

## 2024-11-22 DIAGNOSIS — Z79.4 TYPE 2 DIABETES MELLITUS WITH HYPERGLYCEMIA, WITH LONG-TERM CURRENT USE OF INSULIN (HCC): Primary | ICD-10-CM

## 2024-11-22 DIAGNOSIS — E78.5 HYPERLIPIDEMIA, UNSPECIFIED HYPERLIPIDEMIA TYPE: ICD-10-CM

## 2024-11-22 DIAGNOSIS — Z79.4 TYPE 2 DIABETES MELLITUS WITHOUT COMPLICATION, WITH LONG-TERM CURRENT USE OF INSULIN (HCC): ICD-10-CM

## 2024-11-22 DIAGNOSIS — E11.65 TYPE 2 DIABETES MELLITUS WITH HYPERGLYCEMIA, WITH LONG-TERM CURRENT USE OF INSULIN (HCC): Primary | ICD-10-CM

## 2024-11-22 LAB
ALBUMIN SERPL BCG-MCNC: 4.2 G/DL (ref 3.5–5)
ALP SERPL-CCNC: 79 U/L (ref 34–104)
ALT SERPL W P-5'-P-CCNC: 17 U/L (ref 7–52)
ANION GAP SERPL CALCULATED.3IONS-SCNC: 8 MMOL/L (ref 4–13)
AST SERPL W P-5'-P-CCNC: 18 U/L (ref 13–39)
BILIRUB SERPL-MCNC: 0.92 MG/DL (ref 0.2–1)
BUN SERPL-MCNC: 21 MG/DL (ref 5–25)
CALCIUM SERPL-MCNC: 9.4 MG/DL (ref 8.4–10.2)
CHLORIDE SERPL-SCNC: 103 MMOL/L (ref 96–108)
CHOLEST SERPL-MCNC: 111 MG/DL (ref ?–200)
CO2 SERPL-SCNC: 27 MMOL/L (ref 21–32)
CREAT SERPL-MCNC: 0.9 MG/DL (ref 0.6–1.3)
EST. AVERAGE GLUCOSE BLD GHB EST-MCNC: 140 MG/DL
GFR SERPL CREATININE-BSD FRML MDRD: 77 ML/MIN/1.73SQ M
GLUCOSE P FAST SERPL-MCNC: 128 MG/DL (ref 65–99)
HBA1C MFR BLD: 6.5 %
HDLC SERPL-MCNC: 45 MG/DL
LDLC SERPL CALC-MCNC: 40 MG/DL (ref 0–100)
NONHDLC SERPL-MCNC: 66 MG/DL
POTASSIUM SERPL-SCNC: 4.4 MMOL/L (ref 3.5–5.3)
PROT SERPL-MCNC: 6.9 G/DL (ref 6.4–8.4)
SODIUM SERPL-SCNC: 138 MMOL/L (ref 135–147)
TRIGL SERPL-MCNC: 129 MG/DL (ref ?–150)

## 2024-11-22 PROCEDURE — 80061 LIPID PANEL: CPT

## 2024-11-22 PROCEDURE — 80053 COMPREHEN METABOLIC PANEL: CPT

## 2024-11-22 PROCEDURE — 83036 HEMOGLOBIN GLYCOSYLATED A1C: CPT

## 2024-11-22 PROCEDURE — 36415 COLL VENOUS BLD VENIPUNCTURE: CPT

## 2024-11-22 PROCEDURE — 99211 OFF/OP EST MAY X REQ PHY/QHP: CPT

## 2024-11-26 ENCOUNTER — RESULTS FOLLOW-UP (OUTPATIENT)
Dept: FAMILY MEDICINE CLINIC | Facility: MEDICAL CENTER | Age: 89
End: 2024-11-26

## 2025-01-07 DIAGNOSIS — E78.5 HYPERLIPIDEMIA, UNSPECIFIED HYPERLIPIDEMIA TYPE: ICD-10-CM

## 2025-01-08 RX ORDER — ROSUVASTATIN CALCIUM 5 MG/1
5 TABLET, COATED ORAL DAILY
Qty: 90 TABLET | Refills: 1 | Status: SHIPPED | OUTPATIENT
Start: 2025-01-08

## 2025-01-12 DIAGNOSIS — N40.0 BENIGN PROSTATIC HYPERPLASIA WITHOUT LOWER URINARY TRACT SYMPTOMS: ICD-10-CM

## 2025-01-13 RX ORDER — TAMSULOSIN HYDROCHLORIDE 0.4 MG/1
0.4 CAPSULE ORAL DAILY
Qty: 90 CAPSULE | Refills: 1 | Status: SHIPPED | OUTPATIENT
Start: 2025-01-13

## 2025-01-14 DIAGNOSIS — R45.89 DYSPHORIC MOOD: ICD-10-CM

## 2025-01-14 DIAGNOSIS — I10 HTN (HYPERTENSION), BENIGN: ICD-10-CM

## 2025-01-14 RX ORDER — ESCITALOPRAM OXALATE 10 MG/1
10 TABLET ORAL DAILY
Qty: 90 TABLET | Refills: 1 | Status: SHIPPED | OUTPATIENT
Start: 2025-01-14

## 2025-01-14 RX ORDER — LISINOPRIL 5 MG/1
5 TABLET ORAL DAILY
Qty: 90 TABLET | Refills: 1 | Status: SHIPPED | OUTPATIENT
Start: 2025-01-14

## 2025-01-14 NOTE — TELEPHONE ENCOUNTER
Reason for call:   [x] Refill   [] Prior Auth  [] Other:     Office:   [x] PCP/Provider - [facility]  Authorized By: DELILAH Mcpherson    [] Specialty/Provider -     Medication: escitalopram (LEXAPRO) 10 mg tablet    Dose/Frequency: TAKE 1 TABLET BY MOUTH EVERY DAY    Quantity: 90 tablet    Pharmacy: [facility]/pharmacy [address] [place] [address].    Does the patient have enough for 3 days?   [] Yes   [x] No - Send as HP to POD    Reason for call:   [x] Refill   [] Prior Auth  [] Other:     Office:   [x] PCP/Provider - [facility]  Authorized By: DELILAH Mcpherson    [] Specialty/Provider -     Medication: lisinopril (ZESTRIL) 5 mg tablet    Dose/Frequency: Take 1 tablet (5 mg total) by mouth daily    Quantity: 90 tablet    Pharmacy: [facility]/pharmacy [address] [place] [address].    Does the patient have enough for 3 days?   [] Yes   [x] No - Send as HP to POD

## 2025-01-16 ENCOUNTER — TELEPHONE (OUTPATIENT)
Dept: FAMILY MEDICINE CLINIC | Facility: MEDICAL CENTER | Age: 89
End: 2025-01-16

## 2025-01-16 DIAGNOSIS — E11.65 TYPE 2 DIABETES MELLITUS WITH HYPERGLYCEMIA, WITH LONG-TERM CURRENT USE OF INSULIN (HCC): Primary | ICD-10-CM

## 2025-01-16 DIAGNOSIS — Z79.4 TYPE 2 DIABETES MELLITUS WITH HYPERGLYCEMIA, WITH LONG-TERM CURRENT USE OF INSULIN (HCC): Primary | ICD-10-CM

## 2025-01-16 NOTE — TELEPHONE ENCOUNTER
Patient called the RX Refill Line. Message is being forwarded to the office.     Patient is requesting a refill of BD Ultra Fine Pen Needles 8 MM X 31 G please send to [facility]/pharmacy #[id number] - [facility] - [address]. Patient is out of needles

## 2025-01-28 ENCOUNTER — TELEPHONE (OUTPATIENT)
Dept: FAMILY MEDICINE CLINIC | Facility: MEDICAL CENTER | Age: 89
End: 2025-01-28

## 2025-01-28 NOTE — TELEPHONE ENCOUNTER
Patient called requesting refill for Lisinopril. Patient made aware medication was refilled on 1/14/25 for 90 with 1 refills to [facility] pharmacy. Patient instructed to contact the pharmacy to obtain refills of medication. Patient verbalized understanding.

## 2025-01-28 NOTE — TELEPHONE ENCOUNTER
Patient called stating that the pharmacy told him he could not get the refill of his Lisinopril until the beginning of February. Tried to explain to him it was filled 11/8/24 for a 90 day supply and he should have some medication left. He was upset and hung up.

## 2025-03-12 DIAGNOSIS — E11.65 TYPE 2 DIABETES MELLITUS WITH HYPERGLYCEMIA, WITH LONG-TERM CURRENT USE OF INSULIN (HCC): ICD-10-CM

## 2025-03-12 DIAGNOSIS — Z79.4 TYPE 2 DIABETES MELLITUS WITH HYPERGLYCEMIA, WITH LONG-TERM CURRENT USE OF INSULIN (HCC): ICD-10-CM

## 2025-03-13 RX ORDER — INSULIN GLARGINE 100 [IU]/ML
INJECTION, SOLUTION SUBCUTANEOUS
Qty: 30 ML | Refills: 1 | Status: SHIPPED | OUTPATIENT
Start: 2025-03-13